# Patient Record
Sex: MALE | Race: WHITE | NOT HISPANIC OR LATINO | Employment: OTHER | URBAN - NONMETROPOLITAN AREA
[De-identification: names, ages, dates, MRNs, and addresses within clinical notes are randomized per-mention and may not be internally consistent; named-entity substitution may affect disease eponyms.]

---

## 2018-03-07 ENCOUNTER — OFFICE VISIT CONVERTED (OUTPATIENT)
Dept: FAMILY MEDICINE CLINIC | Age: 63
End: 2018-03-07
Attending: FAMILY MEDICINE

## 2018-06-04 ENCOUNTER — OFFICE VISIT CONVERTED (OUTPATIENT)
Dept: FAMILY MEDICINE CLINIC | Age: 63
End: 2018-06-04
Attending: FAMILY MEDICINE

## 2018-09-04 ENCOUNTER — OFFICE VISIT CONVERTED (OUTPATIENT)
Dept: FAMILY MEDICINE CLINIC | Age: 63
End: 2018-09-04
Attending: FAMILY MEDICINE

## 2018-12-10 ENCOUNTER — OFFICE VISIT CONVERTED (OUTPATIENT)
Dept: FAMILY MEDICINE CLINIC | Age: 63
End: 2018-12-10
Attending: FAMILY MEDICINE

## 2019-03-11 ENCOUNTER — OFFICE VISIT CONVERTED (OUTPATIENT)
Dept: FAMILY MEDICINE CLINIC | Age: 64
End: 2019-03-11
Attending: FAMILY MEDICINE

## 2019-07-03 ENCOUNTER — OFFICE VISIT CONVERTED (OUTPATIENT)
Dept: FAMILY MEDICINE CLINIC | Age: 64
End: 2019-07-03
Attending: FAMILY MEDICINE

## 2019-10-01 ENCOUNTER — HOSPITAL ENCOUNTER (OUTPATIENT)
Dept: OTHER | Facility: HOSPITAL | Age: 64
Discharge: HOME | End: 2019-10-01
Attending: FAMILY MEDICINE

## 2019-10-01 ENCOUNTER — OFFICE VISIT CONVERTED (OUTPATIENT)
Dept: FAMILY MEDICINE CLINIC | Age: 64
End: 2019-10-01
Attending: FAMILY MEDICINE

## 2019-10-01 LAB
ALBUMIN SERPL-MCNC: 4.2 G/DL (ref 3.5–5)
ALBUMIN/GLOB SERPL: 1.5 {RATIO} (ref 1.4–2.6)
ALP SERPL-CCNC: 203 U/L (ref 56–155)
ALT SERPL-CCNC: 15 U/L (ref 10–40)
ANION GAP SERPL CALC-SCNC: 20 MMOL/L (ref 8–19)
AST SERPL-CCNC: 18 U/L (ref 15–50)
BASOPHILS # BLD AUTO: 0.05 10*3/UL (ref 0–0.2)
BASOPHILS NFR BLD AUTO: 0.8 % (ref 0–3)
BILIRUB SERPL-MCNC: 0.22 MG/DL (ref 0.2–1.3)
BUN SERPL-MCNC: 10 MG/DL (ref 5–25)
BUN/CREAT SERPL: 11 {RATIO} (ref 6–20)
CALCIUM SERPL-MCNC: 9.1 MG/DL (ref 8.7–10.4)
CHLORIDE SERPL-SCNC: 104 MMOL/L (ref 99–111)
CHOLEST SERPL-MCNC: 143 MG/DL (ref 107–200)
CHOLEST/HDLC SERPL: 3.7 {RATIO} (ref 3–6)
CONV ABS IMM GRAN: 0.01 10*3/UL (ref 0–0.2)
CONV CO2: 21 MMOL/L (ref 22–32)
CONV IMMATURE GRAN: 0.2 % (ref 0–1.8)
CONV TOTAL PROTEIN: 7 G/DL (ref 6.3–8.2)
CREAT UR-MCNC: 0.88 MG/DL (ref 0.7–1.2)
DEPRECATED RDW RBC AUTO: 40.6 FL (ref 35.1–43.9)
EOSINOPHIL # BLD AUTO: 0.17 10*3/UL (ref 0–0.7)
EOSINOPHIL # BLD AUTO: 2.7 % (ref 0–7)
ERYTHROCYTE [DISTWIDTH] IN BLOOD BY AUTOMATED COUNT: 11.9 % (ref 11.6–14.4)
GFR SERPLBLD BASED ON 1.73 SQ M-ARVRAT: >60 ML/MIN/{1.73_M2}
GLOBULIN UR ELPH-MCNC: 2.8 G/DL (ref 2–3.5)
GLUCOSE SERPL-MCNC: 88 MG/DL (ref 70–99)
HCT VFR BLD AUTO: 38.3 % (ref 42–52)
HDLC SERPL-MCNC: 39 MG/DL (ref 40–60)
HGB BLD-MCNC: 12.5 G/DL (ref 14–18)
IRON SATN MFR SERPL: 31 % (ref 20–55)
IRON SERPL-MCNC: 114 UG/DL (ref 70–180)
LDLC SERPL CALC-MCNC: 82 MG/DL (ref 70–100)
LYMPHOCYTES # BLD AUTO: 1.67 10*3/UL (ref 1–5)
LYMPHOCYTES NFR BLD AUTO: 26.1 % (ref 20–45)
MCH RBC QN AUTO: 31 PG (ref 27–31)
MCHC RBC AUTO-ENTMCNC: 32.6 G/DL (ref 33–37)
MCV RBC AUTO: 95 FL (ref 80–96)
MONOCYTES # BLD AUTO: 0.52 10*3/UL (ref 0.2–1.2)
MONOCYTES NFR BLD AUTO: 8.1 % (ref 3–10)
NEUTROPHILS # BLD AUTO: 3.97 10*3/UL (ref 2–8)
NEUTROPHILS NFR BLD AUTO: 62.1 % (ref 30–85)
NRBC CBCN: 0 % (ref 0–0.7)
OSMOLALITY SERPL CALC.SUM OF ELEC: 290 MOSM/KG (ref 273–304)
PLATELET # BLD AUTO: 281 10*3/UL (ref 130–400)
PMV BLD AUTO: 10.3 FL (ref 9.4–12.4)
POTASSIUM SERPL-SCNC: 4.4 MMOL/L (ref 3.5–5.3)
PSA SERPL-MCNC: 0.78 NG/ML (ref 0–4)
RBC # BLD AUTO: 4.03 10*6/UL (ref 4.7–6.1)
SODIUM SERPL-SCNC: 141 MMOL/L (ref 135–147)
TIBC SERPL-MCNC: 368 UG/DL (ref 245–450)
TRANSFERRIN SERPL-MCNC: 257 MG/DL (ref 215–365)
TRIGL SERPL-MCNC: 109 MG/DL (ref 40–150)
VLDLC SERPL-MCNC: 22 MG/DL (ref 5–37)
WBC # BLD AUTO: 6.39 10*3/UL (ref 4.8–10.8)

## 2020-01-07 ENCOUNTER — OFFICE VISIT CONVERTED (OUTPATIENT)
Dept: FAMILY MEDICINE CLINIC | Age: 65
End: 2020-01-07
Attending: FAMILY MEDICINE

## 2020-04-01 ENCOUNTER — OFFICE VISIT CONVERTED (OUTPATIENT)
Dept: FAMILY MEDICINE CLINIC | Age: 65
End: 2020-04-01
Attending: FAMILY MEDICINE

## 2020-05-20 ENCOUNTER — HOSPITAL ENCOUNTER (OUTPATIENT)
Dept: OTHER | Facility: HOSPITAL | Age: 65
Discharge: HOME | End: 2020-05-20
Attending: FAMILY MEDICINE

## 2020-05-22 LAB — SARS-COV-2 RNA SPEC QL NAA+PROBE: NOT DETECTED

## 2020-07-21 ENCOUNTER — OFFICE VISIT CONVERTED (OUTPATIENT)
Dept: FAMILY MEDICINE CLINIC | Age: 65
End: 2020-07-21
Attending: FAMILY MEDICINE

## 2020-08-10 ENCOUNTER — HOSPITAL ENCOUNTER (OUTPATIENT)
Dept: OTHER | Facility: HOSPITAL | Age: 65
Discharge: HOME | End: 2020-08-10
Attending: FAMILY MEDICINE

## 2020-08-10 LAB
ALBUMIN SERPL-MCNC: 3.9 G/DL (ref 3.5–5)
ALBUMIN/GLOB SERPL: 1.2 {RATIO} (ref 1.4–2.6)
ALP SERPL-CCNC: 220 U/L (ref 56–155)
ALT SERPL-CCNC: 21 U/L (ref 10–40)
ANION GAP SERPL CALC-SCNC: 15 MMOL/L (ref 8–19)
AST SERPL-CCNC: 26 U/L (ref 15–50)
BILIRUB SERPL-MCNC: 0.28 MG/DL (ref 0.2–1.3)
BUN SERPL-MCNC: 12 MG/DL (ref 5–25)
BUN/CREAT SERPL: 13 {RATIO} (ref 6–20)
CALCIUM SERPL-MCNC: 9.2 MG/DL (ref 8.7–10.4)
CHLORIDE SERPL-SCNC: 100 MMOL/L (ref 99–111)
CHOLEST SERPL-MCNC: 173 MG/DL (ref 107–200)
CHOLEST/HDLC SERPL: 4.7 {RATIO} (ref 3–6)
CONV CO2: 24 MMOL/L (ref 22–32)
CONV TOTAL PROTEIN: 7.1 G/DL (ref 6.3–8.2)
CREAT UR-MCNC: 0.92 MG/DL (ref 0.7–1.2)
GFR SERPLBLD BASED ON 1.73 SQ M-ARVRAT: >60 ML/MIN/{1.73_M2}
GLOBULIN UR ELPH-MCNC: 3.2 G/DL (ref 2–3.5)
GLUCOSE SERPL-MCNC: 96 MG/DL (ref 70–99)
HDLC SERPL-MCNC: 37 MG/DL (ref 40–60)
LDLC SERPL CALC-MCNC: 94 MG/DL (ref 70–100)
OSMOLALITY SERPL CALC.SUM OF ELEC: 280 MOSM/KG (ref 273–304)
POTASSIUM SERPL-SCNC: 4.4 MMOL/L (ref 3.5–5.3)
SODIUM SERPL-SCNC: 135 MMOL/L (ref 135–147)
TRIGL SERPL-MCNC: 209 MG/DL (ref 40–150)
VLDLC SERPL-MCNC: 42 MG/DL (ref 5–37)

## 2020-10-20 ENCOUNTER — HOSPITAL ENCOUNTER (OUTPATIENT)
Dept: OTHER | Facility: HOSPITAL | Age: 65
Discharge: HOME | End: 2020-10-20
Attending: FAMILY MEDICINE

## 2020-10-20 ENCOUNTER — OFFICE VISIT CONVERTED (OUTPATIENT)
Dept: FAMILY MEDICINE CLINIC | Age: 65
End: 2020-10-20
Attending: FAMILY MEDICINE

## 2020-10-24 LAB
7-AMINOCLONAZEPAM UR: <5 NG/ML
A-OH ALPRAZ UR CFM-MCNC: <5 NG/ML
ALPHA-HYDROXYMIDAZOLAM, URINE: <20 NG/ML
ALPRAZ UR QL: <5 NG/ML
CLONAZEPAM UR QL: <5 NG/ML
CONV CHOLRDIAZEPOXIDE, QN URINE: <20 NG/ML
CONV OXAZEPAM, (TEMAZEPAM) QUANT: <20 NG/ML
DIAZEPAM UR-MCNC: <20 NG/ML
LORAZEPAM UR-MCNC: <20 NG/ML
MIDAZOLAM URINE: <20 NG/ML
NORDIAZEPAM URINE: <20 NG/ML
TEMAZEPAM UR QL CFM: <20 NG/ML

## 2021-01-20 ENCOUNTER — OFFICE VISIT CONVERTED (OUTPATIENT)
Dept: FAMILY MEDICINE CLINIC | Age: 66
End: 2021-01-20
Attending: FAMILY MEDICINE

## 2021-04-20 ENCOUNTER — HOSPITAL ENCOUNTER (EMERGENCY)
Dept: HOSPITAL 49 - FER | Age: 66
Discharge: HOME | End: 2021-04-20
Payer: COMMERCIAL

## 2021-04-20 DIAGNOSIS — R60.0: Primary | ICD-10-CM

## 2021-04-20 DIAGNOSIS — I10: ICD-10-CM

## 2021-05-18 NOTE — PROGRESS NOTES
Corey Velasquez  1955     Office/Outpatient Visit    Visit Date: Tue, Jan 7, 2020 08:58 am    Provider: Cleopatra Higginbotham MD (Assistant: Lyric Croft MA)    Location: [facility]        Electronically signed by Cleopatra Higginbotham MD on  01/07/2020 12:46:26 PM                             Subjective:        CC: Herrera is a 64 year old White male.  Patient presents today for three month follow up         HPI: Herrera is here to follow up on chronic issues.          He is on Adderall XR 30 mg daily for ADD.  He has been stable on this regimen for years now.  Concentration and focus have been well controlled.  No adverse effects.  He was originally diagnosed by Dr. Zeng many years ago.  He does not take drug holidays.        He is on metoprolol succinate for HTN.  BP has been well controlled.  No CP, SOB, palpitations.  He follows with Cardiology.        He is on fluoxetine for depression.  Sx have been well controlled in general.  His major stressor is his wife Archana's recent mental health issues that sent her to the hospital, but she has been doing much better of late.  Unfortunately, she is still having some memory issues which are stressful for him.  No depression or anhedonia.  No anxiety or panic attacks.         He is on meloxicam for aches and pains, worst in the R arm.    ROS:     CONSTITUTIONAL:  Negative for fatigue and fever.      EYES:  Negative for blurred vision.      E/N/T:  Positive for hoarseness.   Negative for nasal congestion or frequent rhinorrhea.      CARDIOVASCULAR:  Negative for chest pain and palpitations.      RESPIRATORY:  Negative for recent cough and dyspnea.      GASTROINTESTINAL:  Negative for abdominal pain, anorexia, constipation, diarrhea, nausea and vomiting.      MUSCULOSKELETAL:  Negative for arthralgias and myalgias.      INTEGUMENTARY:  Positive for rash.      PSYCHIATRIC:  Negative for anxiety, depression, feelings of stress, anhedonia, difficulty  "concentrating and sleep disturbance.          Past Medical History / Family History / Social History:         Last Reviewed on 2020 09:04 AM by Cleopatra Higginbotham    Past Medical History:     UNREMARKABLE         PREVENTIVE HEALTH MAINTENANCE             COLORECTAL CANCER SCREENING: Up to date (colonoscopy q10y; sigmoidoscopy q5y; Cologuard q3y) was last done 2018, Results are in chart; colonoscopy with normal results; poor prep     Hepatitis C Medicare Screening: was last done 2017         Surgical History:         Cholecystectomy: ;     Joint Replacement: L TKA; 2016;     Tonsillectomy/Adenoidectomy    ortho right hand;     Positive for    Cataract Removal: bilateral; ;     Positive for    Colonoscopy ( , NEG; ) and    EGD -GASTRITIS;;         Family History:     Father:  at age 80;  Myocardial Infarction;  brain tumor     Mother:  at age 78; Cause of death was pneumonia;  Lymphoma     Brother(s): Healthy; 1 brother(s) total         Social History:     Occupation: fire rahel;     Marital Status:  Rosana \"Archana\"     Children: 2 children         Tobacco/Alcohol/Supplements:     Last Reviewed on 2020 09:04 AM by Cleopatra Higginbotham    Tobacco: He has a past history of cigarette smoking; quit date:  1/15/2012.  Non-drinker     Caffeine:  He admits to consuming caffeine via coffee ( 6 servings per day ).          Substance Abuse History:     Last Reviewed on 2020 09:04 AM by Cleopatra Higginbotham        Mental Health History:     Last Reviewed on 2020 09:04 AM by Cleopatra Higginbotham        Communicable Diseases (eg STDs):     Last Reviewed on 2020 09:04 AM by Cleopatra Higginbotham        Current Problems:     Last Reviewed on 10/01/2019 09:59 AM by Cleopatra Higginbotham    Attention-deficit hyperactivity disorder, predominantly inattentive type    Essential (primary) hypertension    Unilateral primary osteoarthritis, left knee    Other long term (current) drug therapy    Anemia, " unspecified    Major depressive disorder, single episode, mild    Other hydrocele    Tachycardia, unspecified        Immunizations:     Hep A, adult dose 9/4/2018    Fluarix pf 10/1/2016    Fluzone (3 + years dose) 10/16/2017    Fluzone Quadrivalent (3+ years) 12/10/2018    Fluzone Quadrivalent (3+ years) 10/1/2019    Tdap (Tetanus, reduced diph, acellular pertussis) 10/1/2019    Shingrix (Zoster recombinant) 12/4/2018        Allergies:     Last Reviewed on 10/01/2019 09:59 AM by Cleopatra Higginbotham    Versed:      Ambien:      hepatits a vaccine:          Current Medications:     Last Reviewed on 10/01/2019 09:59 AM by Cleopatra Higginbotham    Prozac 40mg Capsules [Take 1 capsule(s) by mouth ]    Adderall XR 30 mg oral Capsule, Extended Release 24 hr [1 tab daily]    Metoprolol Succinate 25 mg oral Tablet, Extended Release 24 hr [1 tab PO daily]    OTC Iron Supplement 28mg Take one tablet once daily      OTC Vitamin B Complex w/ Vitamin C Take one tablet once daily      OTC Advil Take two tablets in the morning      Meloxicam 15 mg oral tablet [1 po qd wf prn]        Objective:        Vitals:         Current: 1/7/2020 9:01:48 AM    Ht:  5 ft, 6 in;  Wt: 223.6 lbs;  BMI: 36.1T: 98.3 F (oral);  BP: 140/81 mm Hg (left arm, sitting);  P: 91 bpm (left arm (BP Cuff), sitting);  sCr: 0.88 mg/dL;  GFR: 88.72        Exams:     PHYSICAL EXAM:     GENERAL: Vitals recorded well developed, well nourished;  well groomed;  no apparent distress;     EYES: extraocular movements intact; conjunctiva and cornea are normal; PERRLA;     E/N/T: OROPHARYNX: posterior pharynx shows no exudate and erythema;     RESPIRATORY: normal respiratory rate and pattern with no distress; normal breath sounds with no rales, rhonchi, wheezes or rubs;     CARDIOVASCULAR: normal rate; rhythm is regular;  no systolic murmur; no edema;     SKIN: a rash is noted L dorsal arm;  the color is mainly red;  it is best characterized as papular;     MUSCULOSKELETAL: normal  gait; normal overall tone     PSYCHIATRIC: appropriate affect and demeanor; normal psychomotor function; normal speech pattern; normal thought and perception;         Lab/Test Results:         Amphetamines Screen, Urin: Positive (01/07/2020),     BAR-Barbiturates Screen, Urin: Negative (01/07/2020),     Buprenorphine: Negative (01/07/2020),     BZO-Benzodiazepines Screen,Ur: Negative (01/07/2020),     Cocaine(Metab.)Screen, Ur: Negative (01/07/2020),     MDMA-Ecstasy: Negative (01/07/2020),     Met-Methamphetamine: Negative (01/07/2020),     MTD-Methadone Screen, Urine: Negative (01/07/2020),     Opiate Screen, Urine: Negative (01/07/2020),     OXY-Oxycodone: Negative (01/07/2020),     PCP-Phencyclidine Screen, Uri: Negative (01/07/2020),     THC Cannabinoids Screen, Urin: Negative (01/07/2020),     Urine temperature: confirmed (01/07/2020),     Date and time of last pill: Adderall 01/07/2020 @ 0600/AS (01/07/2020),     Performed by: tls (01/07/2020),     Collection Time: 0905 (01/07/2020),             Assessment:         F90.0   ADD - Attention-deficit hyperactivity disorder, predominantly inattentive type       Z79.899   Other long term (current) drug therapy       I10   HTN - Essential (primary) hypertension       F32.0   Depression - Major depressive disorder, single episode, mild       Z13.31   Encounter for screening for depression       R21   Rash and other nonspecific skin eruption           ORDERS:         Meds Prescribed:       [New Rx] mupirocin 2 % Topical Ointment [apply a small amount to the affected area by topical route 2 times per day], #22 (twenty two) grams, Refills: 1 (one)         Radiology/Test Orders:       3017F  Colorectal CA screen results documented and reviewed (PV)  (In-House)              Lab Orders:       [id number]  Drug test prsmv read direct optical obs pr date  (In-House)            APPTO  Appointment need  (In-House)              Procedures Ordered:       REFER  Referral to Specialist  or Other Facility  (Send-Out)              Other Orders:         Depression screen positive and follow up plan documented  (In-House)                      Plan:         ADD - Attention-deficit hyperactivity disorder, predominantly inattentive typeHe is stable on his current regimen.  Concentration and focus are well controlled.  No adverse effects.  He does require ongoing use of this controlled substance to function.  Tox screen and Christopher run today.  No refills needed today.  RTC 3 months.    MIPS PHQ-9 Depression Screening: Completed form scanned and in chart; Total Score 9 Positive Depression Screen: Stable on medications. No suicidal ideation.      FOLLOW-UP: Schedule a follow-up visit in 3 months.:.  f/u ADD with [facility]          Orders:       3017F  Colorectal CA screen results documented and reviewed (PV)  (In-House)            APPTO  Appointment need  (In-House)              Depression screen positive and follow up plan documented  (In-House)              Other long term (current) drug therapy    Controlled substance documentation: Christopher reviewed; drug screen performed and appropriate; consent is reviewed and signed and on the chart.  He is aware of risk of addiction on this medication, understands that he will need to follow up for a review every 3 months and his medications will be adjusted or decreased as deemed appropriate at each visit.  No history of drug or alcohol abuse.  No concerns about diversion or abuse. He denies side effects related to the medication.  He is aware that he may be called in for pill counts.  The dosing of this medication will be reviewed on a regular basis and reduced if possible..  Ongoing use of a controlled substance is necessary for this patient to have a normal quality of life           Orders:       [id number]  Drug test prsmv read direct optical obs pr date  (In-House)              HTN - Essential (primary) hypertensionBP at goal.  He is under some stress currently  "but has some good coping mechanisms.  No refills needed. Labs reviewed and UTD.        Depression - Major depressive disorder, single episode, mild\"I'm doing fine.\"  Worried about Archana's memory but this does continue to improve.  No refills needed.        Rash and other nonspecific skin eruptionRash on the dorsal left arm x 2 months.  Had a similar outbreak last summer.  Discrete pruritic red papules that begin as pustules.  He has been using all kinds of OTC creams but nothing helps at all except Vagisil (takes away the itch) but nothing heals up the ulcers.  Trying some mupirocin due to pustular appearance and placing referral to Derm for further assistance with dx and treatment.        REFERRALS:  Referral initiated to a dermatologist ( Dr. Wendie Duron; for evaluation of L arm rash ).            Prescriptions:       [New Rx] mupirocin 2 % Topical Ointment [apply a small amount to the affected area by topical route 2 times per day], #22 (twenty two) grams, Refills: 1 (one)           Orders:       REFER  Referral to Specialist or Other Facility  (Send-Out)                  Patient Recommendations:        For  ADD - Attention-deficit hyperactivity disorder, predominantly inattentive type:    Schedule a follow-up visit in 3 months.                APPOINTMENT INFORMATION:        Monday Tuesday Wednesday Thursday Friday Saturday Sunday            Time:___________________AM  PM   Date:_____________________             Charge Capture:         Primary Diagnosis:     F90.0  ADD - Attention-deficit hyperactivity disorder, predominantly inattentive type           Orders:      97104  Office/outpatient visit; established patient, level 4  (In-House)            3017F  Colorectal CA screen results documented and reviewed (PV)  (In-House)            APPTO  Appointment need  (In-House)              Depression screen positive and follow up plan documented  (In-House)              Z79.899  Other long term (current) drug " therapy           Orders:      12230  Drug test prsmv read direct optical obs pr date  (In-House)              I10  HTN - Essential (primary) hypertension     F32.0  Depression - Major depressive disorder, single episode, mild     Z13.31  Encounter for screening for depression     R21  Rash and other nonspecific skin eruption

## 2021-05-18 NOTE — PROGRESS NOTES
Corey Velasquez 1955     Office/Outpatient Visit    Visit Date: Mon, Dec 10, 2018 10:11 am    Provider: Cleopatra Higginbotham MD (Assistant: Lyric Croft MA)    Location: [facility]        Electronically signed by Cleopatra Higginbotham MD on  12/10/2018 10:47:31 AM                             SUBJECTIVE:        CC:     Herrera is a 63 year old White male.  This is a follow-up visit.  Patient presents today for three month follow up         HPI: Herrera is here to follow up on chronic issues.          He is on Adderall XR for ADD.  He has been stable on this regimen for many years.  Focus and concentration are well controlled as long as he stays on the med, although he has had trouble when he tried to go off.  No adverse effects.  He was originally diagnosed by Dr. Zeng years ago.  He does not take drug holidays.        He is on metoprolol succinate for HTN.  BP has been well controlled.  No CP, SOB, palpitations.  He follows with Cardiology.        He is on fluoxetine for depression.  Sx have been well controlled.  No depression, anhedonia.  No anxiety.  Eating and sleeping well.     ROS:     CONSTITUTIONAL:  Negative for chills and fever.      EYES:  Negative for blurred vision.      CARDIOVASCULAR:  Negative for chest pain and palpitations.      RESPIRATORY:  Negative for recent cough and dyspnea.      GASTROINTESTINAL:  Negative for abdominal pain, anorexia, constipation, diarrhea, nausea and vomiting.      MUSCULOSKELETAL:  Negative for arthralgias and myalgias.      PSYCHIATRIC:  Negative for anxiety, depression, feelings of stress, anhedonia, difficulty concentrating and sleep disturbance.          PMH/FMH/SH:     Last Reviewed on 12/10/2018 10:16 AM by Cleopatra Higginbotham    Past Medical History:     UNREMARKABLE         PREVENTIVE HEALTH MAINTENANCE             COLORECTAL CANCER SCREENING: Up to date (colonoscopy q10y; sigmoidoscopy q5y; Cologuard q3y) was last done 2/9/2018, Results are in chart;  colonoscopy with normal results; poor prep     Hepatitis C Medicare Screening: was last done 2017         Surgical History:         Cholecystectomy: ;     Joint Replacement: L TKA; 2016;      Tonsillectomy/Adenoidectomy    ortho right hand;     Positive for    Cataract Removal: bilateral; ;     Positive for    Colonoscopy ( , NEG; ) and    EGD -GASTRITIS;;         Family History:     Father:  at age 80;  Myocardial Infarction;  brain tumor     Mother:  at age 78; Cause of death was pneumonia;  Lymphoma     Brother(s): Healthy; 1 brother(s) total         Social History:     Occupation: fire rahel;     Marital Status:      Children: 2 children         Tobacco/Alcohol/Supplements:     Last Reviewed on 12/10/2018 10:16 AM by Cleopatra Higginbotham    Tobacco: He has a past history of cigarette smoking; quit date:  1/15/2012.  Non-drinker     Caffeine:  He admits to consuming caffeine via coffee ( 6 servings per day ).          Substance Abuse History:     Last Reviewed on 12/10/2018 10:16 AM by Cleopatra Higginbotham        Mental Health History:     Last Reviewed on 12/10/2018 10:16 AM by Cleopatra Higginbotham        Communicable Diseases (eg STDs):     Last Reviewed on 12/10/2018 10:16 AM by Cleopatra Higginbotham            Current Problems:     Last Reviewed on 2018 09:54 AM by Cleopatra Higginbotham    History of noncompliance with medical treatment     Osteoarthritis of knee     ADD     Depression     Hypertension     Other high-risk medications     Nodular prostate, without urinary obstruction     Tachycardia, unspecified     Artificial joint replacement, Knee     Unspecified anemia     Hydrocele, other specified type     Other specified gastritis, without mention of hemorrhage         Immunizations:     Hep A, adult dose 2018     Fluarix pf 10/1/2016     Fluzone (3 + years dose) 10/16/2017         Allergies:     Last Reviewed on 2018 09:54 AM by Cleopatra Higginbotham    Versed:    Alexien:        Current  Medications:     Last Reviewed on 9/04/2018 09:54 AM by Cleopatra Higginbotham    Adderall XR 30mg Capsules, Extended Release 1 tab daily     Prozac 40mg Capsules Take 1 capsule(s) by mouth     Metoprolol Succinate 25mg Tablets, Extended Release 1 tab PO daily     OTC Advil Take two tablets in the morning     OTC Iron Supplement 28mg Take one tablet once daily     OTC Vitamin B Complex w/ Vitamin C Take one tablet once daily         OBJECTIVE:        Vitals:         Current: 12/10/2018 10:13:55 AM    Ht:  5 ft, 6 in;  Wt: 209.2 lbs;  BMI: 33.8    T: 98.3 F (oral);  BP: 108/94 mm Hg (left arm, sitting);  P: 88 bpm (left arm (BP Cuff), sitting);  sCr: 0.82 mg/dL;  GFR: 93.70        Exams:     PHYSICAL EXAM:     GENERAL: Vitals recorded well developed, well nourished;  well groomed;  no apparent distress;     EYES: extraocular movements intact; conjunctiva and cornea are normal; PERRLA;     E/N/T: OROPHARYNX: posterior pharynx shows no exudate and erythema;     RESPIRATORY: normal respiratory rate and pattern with no distress; normal breath sounds with no rales, rhonchi, wheezes or rubs;     CARDIOVASCULAR: normal rate; rhythm is regular;  no systolic murmur; no edema;     MUSCULOSKELETAL: normal gait; normal overall tone     PSYCHIATRIC: appropriate affect and demeanor; normal psychomotor function; normal speech pattern; normal thought and perception;         Lab/Test Results:             Amphetamines Screen, Urin:  Positive (12/10/2018),     BAR-Barbiturates Screen, Urin:  Negative (12/10/2018),     Buprenorphine:  Negative (12/10/2018),     BZO-Benzodiazepines Screen,Ur:  Negative (12/10/2018),     Cocaine(Metab.)Screen, Ur:  Negative (12/10/2018),     MDMA-Ecstasy:  Negative (12/10/2018),     Met-Methamphetamine:  Negative (12/10/2018),     MTD-Methadone Screen, Urine:  Negative (12/10/2018),     Opiate Screen, Urine:  Negative (12/10/2018),     OXY-Oxycodone:  Negative (12/10/2018),     PCP-Phencyclidine Screen, Uri:   Negative (12/10/2018),     THC Cannabinoids Screen, Urin:  Negative (12/10/2018),     Urine temperature:  confirmed (12/10/2018),     Date and time of last pill:  adderall 12/10/18 @630/AS (12/10/2018),     Performed by:  greta (12/10/2018),     Collection Time:  10:18 (12/10/2018),             ASSESSMENT           314.00   F90.0  ADD              DDx:     V58.69   Z79.899  Use of high risk medications              DDx:     401.1   I10  Hypertension              DDx:     296.20   F34.9  Depression              DDx:         ORDERS:         Meds Prescribed:       Refill of: Adderall XR (Amphetamine Aspartate-Sulf/Dextroamphetamine Saccharate-Sulf) 30mg Capsules, Extended Release 1 tab daily  #30 (Thirty) capsule(s) Refills: 0         Lab Orders:       [id number]  Drug test prsmv read direct optical obs pr date  (In-House)         APPTO  Appointment need  (In-House)                   PLAN:          ADD He is stable on his Adderall.  Sx are well controlled.  No adverse effects.  He does require ongoing use of this controlled substance to function.  Tox screen and Christopher run today.  RTC 3 months.         FOLLOW-UP: Schedule a follow-up visit in 3 months..  f/u ADD with [facility]           Prescriptions:       Refill of: Adderall XR (Amphetamine Aspartate-Sulf/Dextroamphetamine Saccharate-Sulf) 30mg Capsules, Extended Release 1 tab daily  #30 (Thirty) capsule(s) Refills: 0           Orders:       APPTO  Appointment need  (In-House)             Patient Education Handouts:       [facility] [place], [place] Medication Compliance           Use of high risk medications     Controlled substance documentation: Christopher reviewed; drug screen performed and appropriate; consent is reviewed and signed and on the chart.  He is aware of risk of addiction on this medication, understands that he will need to follow up for a review every 3 months and his medications will be adjusted or decreased as deemed appropriate at each visit.  No history of drug or alcohol abuse.   No concerns about diversion or abuse. He denies side effects related to the medication.  He is aware that he may be called in for pill counts.  The dosing of this medication will be reviewed on a regular basis and reduced if possible..  Ongoing use of a controlled substance is necessary for this patient to have a normal quality of life           Orders:       [id number]  Drug test prsmv read direct optical obs pr date  (In-House)            Hypertension BP at goal.  No refills needed.  Labs UTD.          Depression Stable.  No refills needed.              Patient Recommendations:        For  ADD:     Schedule a follow-up visit in 3 months.                APPOINTMENT INFORMATION:        Monday Tuesday Wednesday Thursday Friday Saturday Sunday            Time:___________________AM  PM   Date:_____________________             CHARGE CAPTURE           **Please note: ICD descriptions below are intended for billing purposes only and may not represent clinical diagnoses**        Primary Diagnosis:         314.00 ADD            F90.0    Attention-deficit hyperactivity disorder, predominantly inattentive type              Orders:          00770   Office/outpatient visit; established patient, level 4  (In-House)             APPTO   Appointment need  (In-House)           V58.69 Use of high risk medications            Z79.899    Other long term (current) drug therapy              Orders:          [id number]   Drug test prsmv read direct optical obs pr date  (In-House)           401.1 Hypertension            I10    Essential (primary) hypertension    296.20 Depression            F34.9    Persistent mood [affective] disorder, unspecified

## 2021-05-18 NOTE — PROGRESS NOTES
Croey Velasquez  1955     Office/Outpatient Visit    Visit Date: Wed, Apr 1, 2020 09:33 am    Provider: Cleopatra Higginbotham MD (Assistant: Nelly Salomon MA)    Location: [facility], [place]        Electronically signed by Cleopatra Higginbotham MD on  04/01/2020 10:34:27 AM                             Subjective:        CC: Herrera is a 65 year old White male.  This is a follow-up visit.          HPI: Herrera is here to follow up on chronic issues.          He has had rhinorrhea and congestion with significant sinus pressure for the past 2 weeks.  This was preceded by one week of cough, myalgias, fatigue, malaise, and sore throat.  Those sx are all resolved except the sore throat and the sinus sx as above.        He is on Adderall XR 30 mg daily for ADD.  He has been stable on this regimen for years now.  Concentration and focus have been well controlled.  No adverse effects.  He was originally diagnosed by Dr. Zeng many years ago.  He does not take drug holidays.        He is on metoprolol succinate for HTN.  BP has been well controlled.  No CP, SOB, palpitations.  Follows with Cardiology.        He is on fluoxetine for depression.  Sx have been well controlled in general.  No depression or anhedonia.  No anxiety or panic attacks.         He is on meloxicam for aches and pains.    ROS:     CONSTITUTIONAL:  Negative for fatigue and fever.      EYES:  Negative for blurred vision.      E/N/T:  Positive for nasal congestion, frequent rhinorrhea, sinus pressure and sore throat.   Negative for ear pain.      CARDIOVASCULAR:  Negative for chest pain and palpitations.      RESPIRATORY:  Negative for recent cough and dyspnea.      GASTROINTESTINAL:  Negative for abdominal pain, anorexia, constipation, diarrhea, nausea and vomiting.      MUSCULOSKELETAL:  Negative for arthralgias and myalgias.      PSYCHIATRIC:  Negative for anxiety, depression, feelings of stress, anhedonia, difficulty concentrating and sleep  "disturbance.          Past Medical History / Family History / Social History:         Last Reviewed on 2020 09:58 AM by Cleopatra Higginbotham    Past Medical History:     UNREMARKABLE         PREVENTIVE HEALTH MAINTENANCE             COLORECTAL CANCER SCREENING: Up to date (colonoscopy q10y; sigmoidoscopy q5y; Cologuard q3y) was last done 2018, Results are in chart; colonoscopy with normal results; poor prep     Hepatitis C Medicare Screening: was last done 2017         Surgical History:         Cholecystectomy: ;     Joint Replacement: L TKA; 2016;     Tonsillectomy/Adenoidectomy    ortho right hand;     Positive for    Cataract Removal: bilateral; ;     Positive for    Colonoscopy ( , NEG; ) and    EGD -GASTRITIS;;         Family History:     Father:  at age 80;  Myocardial Infarction;  brain tumor     Mother:  at age 78; Cause of death was pneumonia;  Lymphoma     Brother(s): Healthy; 1 brother(s) total         Social History:     Occupation: fire rahel;     Marital Status:  Rosana \"Archana\"     Children: 2 children         Tobacco/Alcohol/Supplements:     Last Reviewed on 2020 09:58 AM by Cleopatra Higginbotham    Tobacco: He has a past history of cigarette smoking; quit date:  1/15/2012.  Non-drinker     Caffeine:  He admits to consuming caffeine via coffee ( 6 servings per day ).          Substance Abuse History:     Last Reviewed on 2020 09:58 AM by Cleopatra Higginbotham        Mental Health History:     Last Reviewed on 2020 09:58 AM by Cleopatra Higginbotham        Communicable Diseases (eg STDs):     Last Reviewed on 2020 09:58 AM by Cleopatra Higginbotham        Current Problems:     Last Reviewed on 2020 09:04 AM by Cleopatra Higginbotham    ADD - Attention-deficit hyperactivity disorder, predominantly inattentive type    HTN - Essential (primary) hypertension    Unilateral primary osteoarthritis, left knee    Other long term (current) drug therapy    Anemia, unspecified    " Depression - Major depressive disorder, single episode, mild    Other hydrocele    Tachycardia, unspecified    Rash and other nonspecific skin eruption    Encounter for screening for depression        Immunizations:     Hep A, adult dose 9/4/2018    Fluarix pf 10/1/2016    Fluzone (3 + years dose) 10/16/2017    Fluzone Quadrivalent (3+ years) 12/10/2018    Fluzone Quadrivalent (3+ years) 10/1/2019    Tdap (Tetanus, reduced diph, acellular pertussis) 10/1/2019    Shingrix (Zoster recombinant) 12/4/2018        Allergies:     Last Reviewed on 4/01/2020 09:36 AM by Nelly Salomon    Versed:      Ambien:      hepatits a vaccine:          Current Medications:     Last Reviewed on 4/01/2020 09:36 AM by Nelly Salomon    Prozac 40mg Capsules [Take 1 capsule(s) by mouth ]    Adderall XR 30 mg oral Capsule, Extended Release 24 hr [1 tab daily]    Metoprolol Succinate 25 mg oral Tablet, Extended Release 24 hr [1 tab PO daily]    OTC Iron Supplement 28mg Take one tablet once daily      OTC Vitamin B Complex w/ Vitamin C Take one tablet once daily      OTC Advil Take two tablets in the morning      meloxicam 15 mg oral tablet [TAKE ONE TABLET BY MOUTH DAILY WITH FOOD AS NEEDED]    mupirocin 2 % Topical Ointment [apply a small amount to the affected area by topical route 2 times per day]    acyclovir 5 % Topical Ointment [TID prn]        Assessment:         F90.0   ADD - Attention-deficit hyperactivity disorder, predominantly inattentive type       Z79.899   Other long term (current) drug therapy       J01.00   Acute maxillary sinusitis, unspecified       I10   HTN - Essential (primary) hypertension       M17.12   Unilateral primary osteoarthritis, left knee       F32.0   Depression - Major depressive disorder, single episode, mild           ORDERS:         Meds Prescribed:       [New Rx] Augmentin 875-125 mg oral tablet [take 1 tablet by oral route every 12 hours for 10 days], #20 (twenty) tablets, Refills: 0 (zero)         Lab  Orders:       APPTO  Appointment need  (In-House)                      Plan:         ADD - Attention-deficit hyperactivity disorder, predominantly inattentive typeStable on current regimen.  Sx well controlled.  No adverse effects.  He does require ongoing use of this controlled substance to function.  Prior tox screen appropriate.  Christopher run today.  No refills needed.  RTC 3 months.    Telehealth: Verbal consent obtained for visit to occur via phone call; Staff, other than provider, present during telephone visit include Nelly Salomon MA; Total time spent was 8 minutes; [id number]--Rjqqetbkb E/M 5-10 minutes     FOLLOW-UP: Schedule a follow-up visit in 3 months.:.  f/u ADD with [place]          Orders:       APPTO  Appointment need  (In-House)              Other long term (current) drug therapy    Controlled substance documentation: Christopher reviewed; prior drug screen consistent; consent is reviewed and signed and on the chart.  He is aware of risk of addiction on this medication, understands that he will need to follow up for a review every 3 months and his medications will be adjusted or decreased as deemed appropriate at each visit.  No history of drug or alcohol abuse.  No concerns about diversion or abuse. He denies side effects related to the medication.  He is aware that he may be called in for pill counts.  The dosing of this medication will be reviewed on a regular basis and reduced if possible..  Ongoing use of a controlled substance is necessary for this patient to have a normal quality of life         Acute maxillary sinusitis, unspecifiedTreating sinusitis due to sx duration.  Symptomatic care recommended with OTC analgesics for pain/fever, OTC decongestants and cough suppressants prn.  Stay well hydrated.  Humidifier in the bedroom at night.  Hot tea with lemon and honey.  RTC prn worsening or failure to improve of sx.          Prescriptions:       [New Rx] Augmentin 875-125 mg oral tablet [take 1 tablet by  oral route every 12 hours for 10 days], #20 (twenty) tablets, Refills: 0 (zero)         HTN - Essential (primary) hypertensionStable.  No refills needed.        Unilateral primary osteoarthritis, left kneeStable.  No refills needed.        Depression - Major depressive disorder, single episode, mildStable.  No refills needed.            Patient Recommendations:        For  ADD - Attention-deficit hyperactivity disorder, predominantly inattentive type:    Schedule a follow-up visit in 3 months.                APPOINTMENT INFORMATION:        Monday Tuesday Wednesday Thursday Friday Saturday Sunday            Time:___________________AM  PM   Date:_____________________             Charge Capture:         Primary Diagnosis:     F90.0  ADD - Attention-deficit hyperactivity disorder, predominantly inattentive type           Orders:      APPTO  Appointment need  (In-House)            [id number]  Phys/QHP telephone evaluation 5-10 min  (In-House)              Z79.899  Other long term (current) drug therapy     J01.00  Acute maxillary sinusitis, unspecified     I10  HTN - Essential (primary) hypertension     M17.12  Unilateral primary osteoarthritis, left knee     F32.0  Depression - Major depressive disorder, single episode, mild

## 2021-05-18 NOTE — PROGRESS NOTES
oCrey Velasquez  1955     Office/Outpatient Visit    Visit Date: Tue, Jul 21, 2020 09:55 am    Provider: Cleopatra Higginbotham MD (Assistant: Nelly Salomon MA)    Location: [facility]        Electronically signed by Cleopatra Higginbotham MD on  07/21/2020 10:24:35 AM                             Subjective:        CC: Herrera is a 65 year old White male.  This is a follow-up visit.  [phone] [employer]         HPI: Herrera's telehealth visit today is for f/u on chronic issues.          He is on Adderall XR 30 mg daily for ADD.  He has been stable on this regimen for years. Concentration and focus have been well controlled.  No adverse effects.  He was originally diagnosed by Dr. Zeng many years ago.  He does not take drug holidays.        He is on metoprolol succinate for HTN.  BP has been well controlled.  No CP, SOB, palpitations.  He follows with Cards.        He is on Prozac for depression.  Sx have been well controlled.  No depression or anhedonia.  No anxiety or panic attacks.         He is on meloxicam for generalized aches and pains.    ROS:     CONSTITUTIONAL:  Negative for fatigue and fever.      EYES:  Negative for blurred vision.      E/N/T:  Negative for ear pain, nasal congestion and frequent rhinorrhea.      CARDIOVASCULAR:  Negative for chest pain and palpitations.      RESPIRATORY:  Negative for recent cough and dyspnea.      GASTROINTESTINAL:  Negative for abdominal pain, anorexia, constipation, diarrhea, nausea and vomiting.      MUSCULOSKELETAL:  Negative for arthralgias and myalgias.      PSYCHIATRIC:  Negative for anxiety, depression, feelings of stress, anhedonia, difficulty concentrating and sleep disturbance.          Past Medical History / Family History / Social History:         Last Reviewed on 7/21/2020 10:13 AM by Cleopatra Higginbotham    Past Medical History:     UNREMARKABLE         PREVENTIVE HEALTH MAINTENANCE             COLORECTAL CANCER SCREENING: Up to date (colonoscopy  "q10y; sigmoidoscopy q5y; Cologuard q3y) was last done 2018, Results are in chart; colonoscopy with normal results; poor prep     Hepatitis C Medicare Screening: was last done 2017         Surgical History:         Cholecystectomy: ;     Joint Replacement: L TKA; 2016;     Tonsillectomy/Adenoidectomy    ortho right hand;     Positive for    Cataract Removal: bilateral; ;     Positive for    Colonoscopy ( , NEG; ) and    EGD -GASTRITIS;;         Family History:     Father:  at age 80;  Myocardial Infarction;  brain tumor     Mother:  at age 78; Cause of death was pneumonia;  Lymphoma     Brother(s): Healthy; 1 brother(s) total         Social History:     Occupation: fire rahel;     Marital Status:  Rosana \"Archana\"     Children: 2 children         Tobacco/Alcohol/Supplements:     Last Reviewed on 2020 10:13 AM by Cleopatra Higginbotham    Tobacco: He has a past history of cigarette smoking; quit date:  1/15/2012.  Non-drinker     Caffeine:  He admits to consuming caffeine via coffee ( 6 servings per day ).          Substance Abuse History:     Last Reviewed on 2020 10:13 AM by Cleopatra Higginbotham        Mental Health History:     Last Reviewed on 2020 10:13 AM by Cleopatra Higginbotham        Communicable Diseases (eg STDs):     Last Reviewed on 2020 10:13 AM by Cleopatra Higginbotham        Current Problems:     Last Reviewed on 2020 09:58 AM by Cleopatra Higginbotham    ADD - Attention-deficit hyperactivity disorder, predominantly inattentive type    HTN - Essential (primary) hypertension    Unilateral primary osteoarthritis, left knee    Other long term (current) drug therapy    Other hydrocele    Tachycardia, unspecified    Anemia, unspecified    Depression - Major depressive disorder, single episode, mild    Rash and other nonspecific skin eruption    Acute maxillary sinusitis, unspecified    Cough        Immunizations:     Hep A, adult dose 2018    Fluarix pf 10/1/2016    Fluzone " (3 + years dose) 10/16/2017    Fluzone Quadrivalent (3+ years) 12/10/2018    Fluzone Quadrivalent (3+ years) 10/1/2019    Tdap (Tetanus, reduced diph, acellular pertussis) 10/1/2019    Shingrix (Zoster recombinant) 12/4/2018        Allergies:     Last Reviewed on 7/21/2020 09:55 AM by Nelly Salomon    Versed:      Ambien:      hepatits a vaccine:          Current Medications:     Last Reviewed on 7/21/2020 09:55 AM by Nelly Salomon    Prozac 40mg Capsules [Take 1 capsule(s) by mouth ]    Adderall XR 30 mg oral Capsule, Extended Release 24 hr [1 tab daily]    Metoprolol Succinate 25 mg oral Tablet, Extended Release 24 hr [1 tab PO daily]    OTC Iron Supplement 28mg Take one tablet once daily      OTC Vitamin B Complex w/ Vitamin C Take one tablet once daily      OTC Advil Take two tablets in the morning      meloxicam 15 mg oral tablet [TAKE ONE TABLET BY MOUTH DAILY WITH FOOD AS NEEDED]    mupirocin 2 % Topical Ointment [apply a small amount to the affected area by topical route 2 times per day]    acyclovir 5 % Topical Ointment [TID prn]        Objective:        Exams:     PHYSICAL EXAM:     GENERAL: well developed, well nourished;  well groomed;  no apparent distress;     EYES: extraocular movements intact; conjunctiva and cornea are normal; PERRLA;     RESPIRATORY: normal respiratory rate and pattern with no distress;     MUSCULOSKELETAL: normal overall tone     NEUROLOGIC: mental status: alert and oriented x 3;     PSYCHIATRIC: appropriate affect and demeanor; normal psychomotor function; normal speech pattern;         Assessment:         F90.0   ADD - Attention-deficit hyperactivity disorder, predominantly inattentive type       Z79.899   Other long term (current) drug therapy       I10   HTN - Essential (primary) hypertension       M17.12   Unilateral primary osteoarthritis, left knee       F32.0   Depression - Major depressive disorder, single episode, mild           ORDERS:         Meds Prescribed:        [Refilled] Metoprolol Succinate 25 mg oral Tablet, Extended Release 24 hr [1 tab PO daily], #90 (ninety) tablets, Refills: 3 (three)       [Refilled] Adderall XR 30 mg oral Capsule, Extended Release 24 hr [1 tab daily], #30 (thirty) capsules, Refills: 0 (zero)         Radiology/Test Orders:       3017F  Colorectal CA screen results documented and reviewed (PV)  (In-House)              Lab Orders:       APPTO  Appointment need  (In-House)            [id number]  [facility] CMP AND LIPID: [id number], [id number]  (Send-Out)                      Plan:         ADD - Attention-deficit hyperactivity disorder, predominantly inattentive typeStable on current regimen.  Sx well controlled.  No adverse effects.  He does require ongoing use of this controlled substance to function.  Prior tox screen appropriate.  Will have him come in this week for his tox screen.  Christopher run today.  Refills needed and sent.  RTC 3 months.    MIPS Vaccines Flu and Pneumonia updated in Shot record Colorectal Cancer Screening is up to date and the results are in the chart Telehealth: Verbal consent obtained for visit to occur via televideo conferencing; Staff, other than provider, present during telephone visit include Nelly Salomon MA     FOLLOW-UP: Schedule a follow-up visit in 3 months.:.  f/u ADD with Karolyn SCHEDULED          Prescriptions:       [Refilled] Adderall XR 30 mg oral Capsule, Extended Release 24 hr [1 tab daily], #30 (thirty) capsules, Refills: 0 (zero)           Orders:       3017F  Colorectal CA screen results documented and reviewed (PV)  (In-House)            APPTO  Appointment need  (In-House)              Other long term (current) drug therapy    Controlled substance documentation: Christopher reviewed; prior drug screen consistent; consent is reviewed and signed and on the chart.  He is aware of risk of addiction on this medication, understands that he will need to follow up for a review every 3 months and his medications will be  adjusted or decreased as deemed appropriate at each visit.  No history of drug or alcohol abuse.  No concerns about diversion or abuse. He denies side effects related to the medication.  He is aware that he may be called in for pill counts.  The dosing of this medication will be reviewed on a regular basis and reduced if possible..  Ongoing use of a controlled substance is necessary for this patient to have a normal quality of life         HTN - Essential (primary) hypertensionStable.  Checking labs.  Refills sent.    LABORATORY:  Labs ordered to be performed today include HTN/Lipid Panel: CMP, Lipid.            Prescriptions:       [Refilled] Metoprolol Succinate 25 mg oral Tablet, Extended Release 24 hr [1 tab PO daily], #90 (ninety) tablets, Refills: 3 (three)           Orders:       [id number]  HTN - [facility] CMP AND LIPID: [id number], [id number]  (Send-Out)              Unilateral primary osteoarthritis, left kneeStable.  No refills needed.        Depression - Major depressive disorder, single episode, mildStable.  No refills needed.            Patient Recommendations:        For  ADD - Attention-deficit hyperactivity disorder, predominantly inattentive type:    Schedule a follow-up visit in 3 months.                APPOINTMENT INFORMATION:        Monday Tuesday Wednesday Thursday Friday Saturday Sunday            Time:___________________AM  PM   Date:_____________________             Charge Capture:         Primary Diagnosis:     F90.0  ADD - Attention-deficit hyperactivity disorder, predominantly inattentive type           Orders:      [id number]  Office/outpatient visit; established patient, level 4  (In-House)            3017F  Colorectal CA screen results documented and reviewed (PV)  (In-House)            APPTO  Appointment need  (In-House)              Z79.899  Other long term (current) drug therapy     I10  HTN - Essential (primary) hypertension     M17.12  Unilateral primary osteoarthritis, left knee     F32.0   Depression - Major depressive disorder, single episode, mild

## 2021-05-18 NOTE — PROGRESS NOTES
Corey Velasquez 1955     Office/Outpatient Visit    Visit Date: Wed, Mar 7, 2018 04:36 pm    Provider: Cleopatra Higginbotham MD (Assistant: Kellen Espinoza MA)    Location: [facility]        Electronically signed by Cleopatra Higginbotham MD on  03/07/2018 05:17:33 PM                             SUBJECTIVE:        CC:     Herrera is a 63 year old White male.  This is a follow-up visit.  3 month check up; PT STATES CARDIOLOGIST DR MCKINLEY DE LA TORRE PUT HIM ON METOPROLOL SUCCINATE 25MG ER ONE TAB DAILY INSTEAD OF LISINOPRIL         HPI: Herrera is here to follow up on chronic issues.  He is on Adderall XR 30 mg daily for ADD.  Sx are well controlled.  He has been stable on this dose for years now.  No adverse effects.  He was originally diagnosed by Dr. Zeng years ago.  He does not take drug holidays.        He is on metoprolol succinate for HTN after his cardiologist switch him from lisinopril.  He had some arrhythmia that showed up during his colonoscopy so they sent him to a cardiologist.  He had an echo done which was completely fine.  Dr. De La Torre did not feel that he needs to come back at this time.  BP has been well controlled.  No CP, palpitations, SOB.        He is on fluoxetine for depression.  Sx have been well controlled.     ROS:     CONSTITUTIONAL:  Negative for chills and fever.      EYES:  Negative for blurred vision.      E/N/T:  Negative for diminished hearing and nasal congestion.      CARDIOVASCULAR:  Negative for chest pain and palpitations.      RESPIRATORY:  Negative for recent cough and dyspnea.      GASTROINTESTINAL:  Negative for abdominal pain, anorexia, constipation, diarrhea, nausea and vomiting.      MUSCULOSKELETAL:  Negative for arthralgias, limb pain and myalgias.      PSYCHIATRIC:  Negative for anxiety, depression, feelings of stress, anhedonia, difficulty concentrating and sleep disturbance.          PMH/FMH/SH:     Last Reviewed on 12/07/2017 04:46 PM by Cleopatra Higginbotham     Past Medical History:     UNREMARKABLE         PREVENTIVE HEALTH MAINTENANCE             COLORECTAL CANCER SCREENING: Up to date (colonoscopy q10y; sigmoidoscopy q5y; Cologuard q3y) was last done 2018, Results are in chart; colonoscopy with normal results; poor prep         Surgical History:         Cholecystectomy: ;     Joint Replacement: L TKA; 2016;      Tonsillectomy/Adenoidectomy    ortho right hand;     Positive for    Cataract Removal: bilateral; ;     Positive for    Colonoscopy ( , NEG; ) and    EGD -GASTRITIS;;         Family History:     Father:  at age 80;  Myocardial Infarction;  brain tumor     Mother:  at age 78; Cause of death was pneumonia;  Lymphoma     Brother(s): Healthy; 1 brother(s) total         Social History:     Occupation: fire rahel;     Marital Status:      Children: 2 children         Tobacco/Alcohol/Supplements:     Last Reviewed on 2017 04:46 PM by Cleopatra Higginbotham    Tobacco: He has a past history of cigarette smoking; quit date:  1/15/2012.  Non-drinker     Caffeine:  He admits to consuming caffeine via coffee ( 6 servings per day ).          Substance Abuse History:     Last Reviewed on 2017 04:46 PM by Cleopatra Higginbotham        Mental Health History:     Last Reviewed on 2017 04:46 PM by Cleopatra Higginbotham        Communicable Diseases (eg STDs):     Last Reviewed on 2017 04:46 PM by Cleopatra Higginbotham            Current Problems:     Last Reviewed on 2017 04:46 PM by Cleopatra Higginbotham    History of noncompliance with medical treatment     Osteoarthritis of knee     ADD     Depression     Hypertension     History of tobacco abuse     Other high-risk medications     Nodular prostate, without urinary obstruction     Tachycardia, unspecified     Artificial joint replacement, Knee     Unspecified anemia     Hydrocele, other specified type     Other specified gastritis, without mention of hemorrhage     Iron deficiency anemia          Immunizations:     Fluarix pf 10/1/2016     Fluzone (3 + years dose) 10/16/2017         Allergies:     Last Reviewed on 12/07/2017 04:46 PM by Cleopatra Higginbotham    Versed:    Ambien:        Current Medications:     Last Reviewed on 12/07/2017 04:46 PM by Cleopatra Higginbotham    Adderall XR 30mg Capsules, Extended Release 1 tab daily     Lisinopril 10mg Tablet Take 1 tablet(s) by mouth daily     Prozac 40mg Capsules Take 1 capsule(s) by mouth         OBJECTIVE:        Vitals:         Current: 3/7/2018 4:39:47 PM    Ht:  5 ft, 6 in;  Wt: 210.4 lbs;  BMI: 34.0    T: 97.6 F (oral);  BP: 118/79 mm Hg (left arm, sitting);  P: 91 bpm (left arm (BP Cuff), sitting);  sCr: 0.87 mg/dL;  GFR: 88.53        Exams:     PHYSICAL EXAM:     GENERAL: Vitals recorded well developed, well nourished;  well groomed;  no apparent distress;     EYES: extraocular movements intact; conjunctiva and cornea are normal; PERRLA;     E/N/T: OROPHARYNX: posterior pharynx shows no exudate and erythema;     RESPIRATORY: normal respiratory rate and pattern with no distress; normal breath sounds with no rales, rhonchi, wheezes or rubs;     CARDIOVASCULAR: normal rate; rhythm is regular;  no systolic murmur; no edema;     GASTROINTESTINAL: nontender; normal bowel sounds;     MUSCULOSKELETAL: normal gait; normal overall tone     PSYCHIATRIC: appropriate affect and demeanor; normal psychomotor function; normal speech pattern; normal thought and perception;         Lab/Test Results:             Amphetamines Screen, Urin:  Positive (03/07/2018),     BAR-Barbiturates Screen, Urin:  Negative (03/07/2018),     Buprenorphine:  Negative (03/07/2018),     BZO-Benzodiazepines Screen,Ur:  Negative (03/07/2018),     Cocaine(Metab.)Screen, Ur:  Negative (03/07/2018),     MDMA-Ecstasy:  Negative (03/07/2018),     Met-Methamphetamine:  Negative (03/07/2018),     MTD-Methadone Screen, Urine:  Negative (03/07/2018),     Opiate Screen, Urine:  Negative (03/07/2018),      OXY-Oxycodone:  Negative (03/07/2018),     PCP-Phencyclidine Screen, Uri:  Negative (03/07/2018),     THC Cannabinoids Screen, Urin:  Negative (03/07/2018),     Urine temperature:  confirmed (03/07/2018),     Date and time of last pill:  adderall 3/7/18 @ 630am (03/07/2018),     Performed by:  Luis Carlos (03/07/2018),     Collection Time:  5:12pm (03/07/2018),             ASSESSMENT           314.00   F90.0  ADD              DDx:     V58.69   Z79.899  Other high-risk medications              DDx:     296.20   F34.9  Depression              DDx:     401.1   I10  Hypertension              DDx:         ORDERS:         Meds Prescribed:       Refill of: Adderall XR (Amphetamine Aspartate-Sulf/Dextroamphetamine Saccharate-Sulf) 30mg Capsules, Extended Release 1 tab daily  #30 (Thirty) capsule(s) Refills: 0       Refill of: Prozac (Fluoxetine) 40mg Capsules Take 1 capsule(s) by mouth  #90 (Ninety) capsule(s) Refills: 1         Lab Orders:       07388  Drug test prsmv qual dir optical obs per day  (In-House)                   PLAN:          ADD Stable on Adderall.  He has been on this regimen for years.  No adverse effects.  He does require ongoing use of this controlled substance to function.  Refills sent as below.  Tox screen and Christopher run.  RTC 3 months after I get back from maternity leave.         FOLLOW-UP: Schedule a follow-up visit in 3 months..            Prescriptions:       Refill of: Adderall XR (Amphetamine Aspartate-Sulf/Dextroamphetamine Saccharate-Sulf) 30mg Capsules, Extended Release 1 tab daily  #30 (Thirty) capsule(s) Refills: 0           Patient Education Handouts:       [facility] Medication Compliance           Other high-risk medications         FOLLOW-UP: Schedule a follow-up visit in 3 months..  Controlled substance documentation: Christopher reviewed; drug screen performed and appropriate; consent is reviewed and signed and on the chart.  He is aware of risk of addiction on this medication, understands that he  will need to follow up for a review every 3 months and his medications will be adjusted or decreased as deemed appropriate at each visit.  No history of drug or alcohol abuse.  No concerns about diversion or abuse. He denies side effects related to the medication.  He is aware that he may be called in for pill counts.  The dosing of this medication will be reviewed on a regular basis and reduced if possible..  Ongoing use of a controlled substance is necessary for this patient to have a normal quality of life Drug screen           Orders:       06973  Drug test prsmv qual dir optical obs per day  (In-House)            Depression Stable.  Refills sent.           Prescriptions:       Refill of: Prozac (Fluoxetine) 40mg Capsules Take 1 capsule(s) by mouth  #90 (Ninety) capsule(s) Refills: 1          Hypertension BP at goal.  Doing well on metoprolol succinate since he had that switched.  No refills needed.             Patient Recommendations:        For  ADD:     Schedule a follow-up visit in 3 months.                APPOINTMENT INFORMATION:        Monday Tuesday Wednesday Thursday Friday Saturday Sunday            Time:___________________AM  PM   Date:_____________________         For  Other high-risk medications:     Schedule a follow-up visit in 3 months.                APPOINTMENT INFORMATION:        Monday Tuesday Wednesday Thursday Friday Saturday Sunday            Time:___________________AM  PM   Date:_____________________             CHARGE CAPTURE           **Please note: ICD descriptions below are intended for billing purposes only and may not represent clinical diagnoses**        Primary Diagnosis:         314.00 ADD            F90.0    Attention-deficit hyperactivity disorder, predominantly inattentive type              Orders:          73963   Office/outpatient visit; established patient, level 4  (In-House)           V58.69 Other high-risk medications            Z79.899    Other long term  (current) drug therapy              Orders:          66043   Drug test prsmv qual dir optical obs per day  (In-House)           296.20 Depression            F34.9    Persistent mood [affective] disorder, unspecified    401.1 Hypertension            I10    Essential (primary) hypertension

## 2021-05-18 NOTE — PROGRESS NOTES
Corey Velasquez  1955     Office/Outpatient Visit    Visit Date: Tue, Oct 20, 2020 10:04 am    Provider: Mark Osorio MD (Assistant: Nelly Salomon MA)    Location: [facility]        Electronically signed by Mark Osorio MD on  10/20/2020 12:55:49 PM                             Subjective:        CC: Herrera is a 65 year old White male.  This is a follow-up visit.          HPI:       Herrera presents to clinic today for follow-up of ADHD. He has no acute complaints pertaining to this today.  His current regimen includes Adderall XR 30 mg daily.  No adverse effects from the medication.           Additionally, he presents with history of hTN - Essential (primary) hypertension.  his current cardiac medication regimen includes a beta-blocker ( Toprol-XL 25 mg daily. ).  Compliance with treatment has been good; he takes his medication as directed and follows up as directed.  He is tolerating the medication well without side effects.  He did not bring his blood pressure diary, but says that pressures have been okay.      ROS:     CONSTITUTIONAL:  Negative for fatigue and fever.      EYES:  Negative for blurred vision.      E/N/T:  Negative for ear pain, nasal congestion and frequent rhinorrhea.      CARDIOVASCULAR:  Negative for chest pain and palpitations.      RESPIRATORY:  Negative for recent cough and dyspnea.      GASTROINTESTINAL:  Negative for abdominal pain, anorexia, constipation, diarrhea, nausea and vomiting.      MUSCULOSKELETAL:  Negative for arthralgias and myalgias.      PSYCHIATRIC:  Negative for anxiety, depression, feelings of stress, anhedonia, difficulty concentrating and sleep disturbance.          Past Medical History / Family History / Social History:         Last Reviewed on 10/20/2020 12:55 PM by Mark Osorio    Past Medical History:     UNREMARKABLE         PREVENTIVE HEALTH MAINTENANCE             COLORECTAL CANCER SCREENING: Up to date (colonoscopy q10y; sigmoidoscopy  "q5y; Cologuard q3y) was last done 2018, Results are in chart; colonoscopy with normal results; poor prep     Hepatitis C Medicare Screening: was last done 2017         Surgical History:         Cholecystectomy: ;     Joint Replacement: L TKA; 2016;     Tonsillectomy/Adenoidectomy    ortho right hand;     Positive for    Cataract Removal: bilateral; ;     Positive for    Colonoscopy ( , NEG; ) and    EGD -GASTRITIS;;         Family History:     Father:  at age 80;  Myocardial Infarction;  brain tumor     Mother:  at age 78; Cause of death was pneumonia;  Lymphoma     Brother(s): Healthy; 1 brother(s) total         Social History:     Occupation: fire rahel;     Marital Status:  Rosana \"Archana\"     Children: 2 children         Tobacco/Alcohol/Supplements:     Last Reviewed on 10/20/2020 12:55 PM by Mark Osorio    Tobacco: He has a past history of cigarette smoking; quit date:  1/15/2012.  Non-drinker     Caffeine:  He admits to consuming caffeine via coffee ( 6 servings per day ).          Substance Abuse History:     Last Reviewed on 10/20/2020 12:55 PM by Mark Osorio        Mental Health History:     Last Reviewed on 10/20/2020 12:55 PM by Mark Osorio        Communicable Diseases (eg STDs):     Last Reviewed on 10/20/2020 12:55 PM by Mark Osorio        Current Problems:     Last Reviewed on 10/20/2020 12:55 PM by Mark Osorio    ADD - Attention-deficit hyperactivity disorder, predominantly inattentive type    Unilateral primary osteoarthritis, left knee    Other long term (current) drug therapy    Anemia, unspecified    Depression - Major depressive disorder, single episode, mild    Other hydrocele    Tachycardia, unspecified    Rash and other nonspecific skin eruption    Cough    HTN - Essential (primary) hypertension        Immunizations:     Hep A, adult dose 2018    Fluarix pf 10/1/2016    Fluzone (3 + years dose) 10/16/2017    Fluzone Quadrivalent (3+ years) " 12/10/2018    Fluzone Quadrivalent (3+ years) 10/1/2019    Tdap (Tetanus, reduced diph, acellular pertussis) 10/1/2019    Shingrix (Zoster recombinant) 12/4/2018    influenza, injectable, quadrivalent 10/1/2020        Allergies:     Last Reviewed on 10/20/2020 12:55 PM by Mark Osorio    Versed:      Ambien:      hepatits a vaccine:          Current Medications:     Last Reviewed on 10/20/2020 12:55 PM by Mark Osorio    PROzac 40 mg oral capsule [TAKE ONE CAPSULE BY MOUTH DAILY]    Adderall XR 30 mg oral Capsule, Extended Release 24 hr [1 tab daily]    Metoprolol Succinate 25 mg oral Tablet, Extended Release 24 hr [1 tab PO daily]    OTC Iron Supplement 28mg Take one tablet once daily      OTC Vitamin B Complex w/ Vitamin C Take one tablet once daily      OTC Advil Take two tablets in the morning      meloxicam 15 mg oral tablet [TAKE ONE TABLET BY MOUTH DAILY WITH FOOD AS NEEDED]    mupirocin 2 % Topical Ointment [apply a small amount to the affected area by topical route 2 times per day]    acyclovir 5 % Topical Ointment [TID prn]        Objective:        Vitals:         Current: 10/20/2020 10:09:43 AM    Ht:  5 ft, 6 in;  Wt: 221.8 lbs;  BMI: 35.8T: 96.3 F (temporal);  BP: 146/97 mm Hg (left arm, sitting);  P: 96 bpm (left arm (BP Cuff), sitting);  sCr: 0.92 mg/dL;  GFR: 83.52        Repeat:     10:27:23 AM  BP:   147/87mm Hg (left arm, sitting, P-92)     Exams:     PHYSICAL EXAM:     GENERAL: well developed, well nourished;  well groomed;  no apparent distress;     EYES: extraocular movements intact; conjunctiva and cornea are normal; PERRLA;     RESPIRATORY: normal respiratory rate and pattern with no distress;     MUSCULOSKELETAL: normal overall tone     NEUROLOGIC: mental status: alert and oriented x 3;     PSYCHIATRIC: appropriate affect and demeanor; normal psychomotor function; normal speech pattern;         Lab/Test Results:         Amphetamines Screen, Urin: Positive (10/20/2020),     BAR-Barbiturates  Screen, Urin: Negative (10/20/2020),     Buprenorphine: Negative (10/20/2020),     BZO-Benzodiazepines Screen,Ur: Positive (10/20/2020),     Cocaine(Metab.)Screen, Ur: Negative (10/20/2020),     MDMA-Ecstasy: Negative (10/20/2020),     Met-Methamphetamine: Negative (10/20/2020),     MTD-Methadone Screen, Urine: Negative (10/20/2020),     Opiate Screen, Urine: Negative (10/20/2020),     OXY-Oxycodone: Negative (10/20/2020),     PCP-Phencyclidine Screen, Uri: Negative (10/20/2020),     THC Cannabinoids Screen, Urin: Negative (10/20/2020),     Urine temperature: confirmed (10/20/2020),     Date and time of last pill: adderall 10/20/20 @ 6am /al (10/20/2020),     Performed by: monico (10/20/2020),     Collection Time: 1015 (10/20/2020),             Assessment:         F90.0   ADD - Attention-deficit hyperactivity disorder, predominantly inattentive type       I10   HTN - Essential (primary) hypertension       Z79.899   Other long term (current) drug therapy           ORDERS:         Lab Orders:       [id number]  Drug test prsmv read direct optical obs pr date  (In-House)            02969  [facility] - H [id number] BENZODIAZEPINES  (Send-Out)                      Plan:         ADD - Attention-deficit hyperactivity disorder, predominantly inattentive type- Stable.  Continue Adderall XR 30 mg daily.        HTN - Essential (primary) hypertension- Borderline/slightly elevated in office today.  However, patient reports that is been in range at home.  He declines medication changes today.  Continue Toprol-XL 25 mg daily.  Continue to monitor closely.        Other long term (current) drug therapy    LABORATORY:  Labs ordered to be performed today include Drug Screen Urine [facility] Confirmation [facility].  Controlled substance documentation: Christopher reviewed; drug screen performed and inconsistent--will send for confirmation; consent is reviewed and signed and on the chart.  He is aware of risk of addiction on this medication, understands that  he will need to follow up for a review every 3 months and his medications will be adjusted or decreased as deemed appropriate at each visit.  No history of drug or alcohol abuse.  No concerns about diversion or abuse. He denies side effects related to the medication.  He is aware that he may be called in for pill counts.  The dosing of this medication will be reviewed on a regular basis and reduced if possible..  Ongoing use of a controlled substance is necessary for this patient to have a normal quality of life           Orders:       [id number]  Drug test prsmv read direct optical obs pr date  (In-House)            00[facility] [id number] [facility]  (Send-Out)                  Charge Capture:         Primary Diagnosis:     F90.0  ADD - Attention-deficit hyperactivity disorder, predominantly inattentive type           Orders:      15231  Office/outpatient visit; established patient, level 4  (In-House)              I10  HTN - Essential (primary) hypertension     Z79.899  Other long term (current) drug therapy           Orders:      28032  Drug test prsmv read direct optical obs pr date  (In-House)

## 2021-05-18 NOTE — PROGRESS NOTES
Corey Velasquez 1955     Office/Outpatient Visit    Visit Date: Wed, Jul 3, 2019 10:03 am    Provider: Cleopatra Higginbotham MD (Assistant: Ting Love)    Location: [facility]        Electronically signed by Cleopatra Higginbotham MD on  07/03/2019 10:34:49 AM                             SUBJECTIVE:        CC:     Herrera is a 64 year old White male.  This is a follow-up visit.  ADD, Medication refills Patient requesting Rx for Meloxicam for right arm pain.          HPI: Herrera is here today for routine follow-up on chronic issues.          He is on Adderall XR 30 mg daily for ADD.  He has been stable on this regimen for years now.  Sx of concentration and focus well controlled.  No adverse effects.  He was originally diagnosed by Dr. Zeng years ago.  He does not take drug holidays.  He came in for a drug screen about 3 weeks ago, which was appropriate.        He is on metoprolol succinate for HTN.  BP has been well controlled.  No CP, SOB, palpitations.  He follows with Cardiology.        He is on Prozac for depression.  Sx have been well controlled although he has been under stress lately with his wife Archana's mental health issues.  No depression or anhedonia.  No anxiety or panic attacks.         He is having some right arm pain.  Would like to try some meloxicam for this.         Patient presents with screening for depression.          PHQ-9 Depression Screening: Completed form scanned and in chart; Total Score 0 Alcohol Consumption Screening: Completed form scanned and in chart; Total Score 0     ROS:     CONSTITUTIONAL:  Negative for chills and fever.      EYES:  Negative for blurred vision.      CARDIOVASCULAR:  Negative for chest pain and palpitations.      RESPIRATORY:  Negative for recent cough and dyspnea.      GASTROINTESTINAL:  Negative for abdominal pain, anorexia, constipation, diarrhea, nausea and vomiting.      MUSCULOSKELETAL:  Negative for arthralgias and myalgias.       "PSYCHIATRIC:  Negative for anxiety, depression, feelings of stress, anhedonia, difficulty concentrating and sleep disturbance.          PMH/FMH/SH:     Last Reviewed on 2019 10:21 AM by Cleopatra Higginbotham    Past Medical History:     UNREMARKABLE         PREVENTIVE HEALTH MAINTENANCE             COLORECTAL CANCER SCREENING: Up to date (colonoscopy q10y; sigmoidoscopy q5y; Cologuard q3y) was last done 2018, Results are in chart; colonoscopy with normal results; poor prep     Hepatitis C Medicare Screening: was last done 2017         Surgical History:         Cholecystectomy: ;     Joint Replacement: L TKA; 2016;      Tonsillectomy/Adenoidectomy    ortho right hand;     Positive for    Cataract Removal: bilateral; ;     Positive for    Colonoscopy ( , NEG; ) and    EGD -GASTRITIS;;         Family History:     Father:  at age 80;  Myocardial Infarction;  brain tumor     Mother:  at age 78; Cause of death was pneumonia;  Lymphoma     Brother(s): Healthy; 1 brother(s) total         Social History:     Occupation: fire rahel;     Marital Status:  Rosana \"Archana\"     Children: 2 children         Tobacco/Alcohol/Supplements:     Last Reviewed on 2019 10:21 AM by Cleopatra Higginbotham    Tobacco: He has a past history of cigarette smoking; quit date:  1/15/2012.  Non-drinker     Caffeine:  He admits to consuming caffeine via coffee ( 6 servings per day ).          Substance Abuse History:     Last Reviewed on 2019 10:21 AM by Cleopatra Higginbotham        Mental Health History:     Last Reviewed on 2019 10:21 AM by Cleopatra Higginbotham        Communicable Diseases (eg STDs):     Last Reviewed on 2019 10:21 AM by Cleopatra Higginbotham            Current Problems:     Last Reviewed on 3/11/2019 10:23 AM by Cleopatra Higginbotham    Use of high risk medications     History of noncompliance with medical treatment     Osteoarthritis of knee     ADD     Depression     Hypertension     Other high-risk " medications     Nodular prostate, without urinary obstruction     Tachycardia, unspecified     Artificial joint replacement, Knee     Unspecified anemia     Hydrocele, other specified type     Other specified gastritis, without mention of hemorrhage         Immunizations:     Hep A, adult dose 9/4/2018     Fluarix pf 10/1/2016     Fluzone (3 + years dose) 10/16/2017     Fluzone Quadrivalent (3+ years) 12/10/2018         Allergies:     Last Reviewed on 7/03/2019 10:03 AM by Ting Love    Versed:    Ambien:        Current Medications:     Last Reviewed on 7/03/2019 10:11 AM by Ting Love    Adderall XR 30mg Capsules, Extended Release 1 tab daily     Prozac 40mg Capsules Take 1 capsule(s) by mouth     Metoprolol Succinate 25mg Tablets, Extended Release 1 tab PO daily     OTC Advil Take two tablets in the morning     OTC Iron Supplement 28mg Take one tablet once daily     OTC Vitamin B Complex w/ Vitamin C Take one tablet once daily         OBJECTIVE:        Vitals:         Current: 7/3/2019 10:09:22 AM    Ht:  5 ft, 6 in;  Wt: 216 lbs;  BMI: 34.9    T: 98.6 F (oral);  BP: 123/79 mm Hg (left arm, sitting);  P: 84 bpm (left arm (BP Cuff), sitting);  sCr: 0.82 mg/dL;  GFR: 93.82    O2 Sat: 99 % (room air)        Exams:     PHYSICAL EXAM:     GENERAL: Vitals recorded well developed, well nourished;  well groomed;  no apparent distress;     EYES: extraocular movements intact; conjunctiva and cornea are normal; PERRLA;     E/N/T: OROPHARYNX: posterior pharynx shows no exudate and erythema;     RESPIRATORY: normal respiratory rate and pattern with no distress; normal breath sounds with no rales, rhonchi, wheezes or rubs;     CARDIOVASCULAR: normal rate; rhythm is regular;  no systolic murmur; no edema;     MUSCULOSKELETAL: normal gait; normal overall tone     PSYCHIATRIC: appropriate affect and demeanor; normal psychomotor function; normal speech pattern; normal thought and perception;         ASSESSMENT            314.00   F90.0  ADD              DDx:     V58.69   Z79.899  Use of high risk medications              DDx:     296.20   F34.9  Depression              DDx:     729.5   M79.601  Arm pain              DDx:     V79.0   Z13.89  Screening for depression              DDx:     401.1   I10  Hypertension              DDx:     V79.0   Z13.89  Screening for depression              DDx:         ORDERS:         Meds Prescribed:       Meloxicam 15mg Tablet 1 po qd wf prn  #45 (Forty Five) tablet(s) Refills: 1       Refill of: Metoprolol Succinate 25mg Tablets, Extended Release 1 tab PO daily  #90 (Ninety) tablet(s) Refills: 3         Radiology/Test Orders:       3017F  Colorectal CA screen results documented and reviewed (PV)  (In-House)           Lab Orders:       APPTO  Appointment need  (In-House)           Other Orders:         Depression screen negative  (In-House)           Negative EtOH screen  (In-House)                   PLAN:          ADD He is stable on his current regimen.  Concentration is well controlled.  No adverse effects.  He does require ongoing use of this controlled substance to function.  Prior tox screen 3 weeks ago was appropriate; Christopher run today.  No refills needed today.  RTC 3 months for annual physical.     MIPS PHQ-9 Depression Screening: Completed form scanned and in chart; Total Score 0; Negative Depression Screen Negative alcohol screen     FOLLOW-UP: Schedule a follow-up visit in 3 months..  annual physical 30 min with Malcomialice           Orders:       3017F  Colorectal CA screen results documented and reviewed (PV)  (In-House)         APPTO  Appointment need  (In-House)           Depression screen negative  (In-House)           Negative EtOH screen  (In-House)            Use of high risk medications     Controlled substance documentation: Christopher reviewed; prior drug screen consistent; consent is reviewed and signed and on the chart.  He is aware of risk of addiction on  this medication, understands that he will need to follow up for a review every 3 months and his medications will be adjusted or decreased as deemed appropriate at each visit.  No history of drug or alcohol abuse.  No concerns about diversion or abuse. He denies side effects related to the medication.  He is aware that he may be called in for pill counts.  The dosing of this medication will be reviewed on a regular basis and reduced if possible..  Ongoing use of a controlled substance is necessary for this patient to have a normal quality of life          Depression Stable.  Doing well on Prozac.  No refills needed today.          Arm pain H/o R biceps tendon tear.  Rx sent for meloxicam.           Prescriptions:       Meloxicam 15mg Tablet 1 po qd wf prn  #45 (Forty Five) tablet(s) Refills: 1          Hypertension BP at goal.  Refills sent.  Plan to check labs at annual physical next visit.           Prescriptions:       Refill of: Metoprolol Succinate 25mg Tablets, Extended Release 1 tab PO daily  #90 (Ninety) tablet(s) Refills: 3             Patient Recommendations:        For  ADD:     Schedule a follow-up visit in 3 months.                APPOINTMENT INFORMATION:        Monday Tuesday Wednesday Thursday Friday Saturday Sunday            Time:___________________AM  PM   Date:_____________________             CHARGE CAPTURE           **Please note: ICD descriptions below are intended for billing purposes only and may not represent clinical diagnoses**        Primary Diagnosis:         314.00 ADD            F90.0    Attention-deficit hyperactivity disorder, predominantly inattentive type              Orders:          64062   Office/outpatient visit; established patient, level 4  (In-House)             3017F   Colorectal CA screen results documented and reviewed (PV)  (In-House)             APPTO   Appointment need  (In-House)                Depression screen negative  (In-House)                 Negative EtOH screen  (In-House)           V58.69 Use of high risk medications            Z79.899    Other long term (current) drug therapy    296.20 Depression            F34.9    Persistent mood [affective] disorder, unspecified    729.5 Arm pain            M79.601    Pain in right arm    V79.0 Screening for depression            Z13.89    Encounter for screening for other disorder    401.1 Hypertension            I10    Essential (primary) hypertension    V79.0 Screening for depression            Z13.89    Encounter for screening for other disorder

## 2021-05-18 NOTE — PROGRESS NOTES
Corey Velasquez 1955     Office/Outpatient Visit    Visit Date: Mon, Jun 4, 2018 11:47 am    Provider: Cleopatra Higginbotham MD (Assistant: Meredith Miller MA)    Location: [facility]        Electronically signed by Cleopatra Higginbotham MD on  06/04/2018 12:26:58 PM                             SUBJECTIVE:        CC:     Herrera is a 63 year old White male.  This is a follow-up visit.  3 MONTHS, MED REFILLS ADD, depression         HPI: Herrera is here today for routine f/u of chronic issues.          He is on Adderall XR for ADD.  He has been on this dose for many years now, and sx have been well controlled.  He has had problems with concentration and focus when he has gone off the medication.  No adverse effects.  He was originally diagnosed by Dr. Zeng years ago.  He does not take drug holidays.        He is on metoprolol succinate for HTN.  BP has been well controlled.  No CP, SOB, palpitations.  He follows with Cardiology.        He is on fluoxetine for depression.  Sx have been well controlled.  No depression or anxiety, no anhedonia.     ROS:     CONSTITUTIONAL:  Negative for chills and fever.      EYES:  Negative for blurred vision.      E/N/T:  Negative for diminished hearing and nasal congestion.      CARDIOVASCULAR:  Negative for chest pain and palpitations.      RESPIRATORY:  Negative for recent cough and dyspnea.      GASTROINTESTINAL:  Negative for abdominal pain, anorexia, constipation, diarrhea, nausea and vomiting.      MUSCULOSKELETAL:  Positive for R hand pain -- sharp, wraps around dorsal to ventral of 3rd and 4th digits.   Negative for arthralgias, limb pain or myalgias.      PSYCHIATRIC:  Negative for anxiety, depression, feelings of stress, anhedonia, difficulty concentrating and sleep disturbance.          PMH/FMH/SH:     Last Reviewed on 6/04/2018 11:53 AM by Cleopatra Higginbotham    Past Medical History:     UNREMARKABLE         PREVENTIVE HEALTH MAINTENANCE             COLORECTAL  CANCER SCREENING: Up to date (colonoscopy q10y; sigmoidoscopy q5y; Cologuard q3y) was last done 2018, Results are in chart; colonoscopy with normal results; poor prep         Surgical History:         Cholecystectomy: ;     Joint Replacement: L TKA; 2016;      Tonsillectomy/Adenoidectomy    ortho right hand;     Positive for    Cataract Removal: bilateral; ;     Positive for    Colonoscopy ( , NEG; ) and    EGD -GASTRITIS;;         Family History:     Father:  at age 80;  Myocardial Infarction;  brain tumor     Mother:  at age 78; Cause of death was pneumonia;  Lymphoma     Brother(s): Healthy; 1 brother(s) total         Social History:     Occupation: fire rahel;     Marital Status:      Children: 2 children         Tobacco/Alcohol/Supplements:     Last Reviewed on 2018 11:53 AM by Cleopatra Higginbotham    Tobacco: He has a past history of cigarette smoking; quit date:  1/15/2012.  Non-drinker     Caffeine:  He admits to consuming caffeine via coffee ( 6 servings per day ).          Substance Abuse History:     Last Reviewed on 2018 11:53 AM by Cleopatra Higginbotham        Mental Health History:     Last Reviewed on 2018 11:53 AM by Cleopatra Higginbotham        Communicable Diseases (eg STDs):     Last Reviewed on 2018 11:53 AM by Cleopatra Higginbotham            Current Problems:     Last Reviewed on 2017 04:46 PM by Cleopatra Higginbotham    History of noncompliance with medical treatment     Osteoarthritis of knee     ADD     Depression     Hypertension     History of tobacco abuse     Other high-risk medications     Nodular prostate, without urinary obstruction     Tachycardia, unspecified     Artificial joint replacement, Knee     Unspecified anemia     Hydrocele, other specified type     Other specified gastritis, without mention of hemorrhage     Iron deficiency anemia         Immunizations:     Fluarix pf 10/1/2016     Fluzone (3 + years dose) 10/16/2017         Allergies:      Last Reviewed on 3/07/2018 04:40 PM by Kellen Espinoza    Versed:    Alexien:        Current Medications:     Last Reviewed on 3/07/2018 04:40 PM by Kellen Espinoza    Adderall XR 30mg Capsules, Extended Release 1 tab daily     Prozac 40mg Capsules Take 1 capsule(s) by mouth     Metoprolol Succinate 25mg Tablets, Extended Release 1 tab PO daily         OBJECTIVE:        Vitals:         Current: 6/4/2018 11:48:47 AM    Ht:  5 ft, 6 in;  Wt: 209.8 lbs;  BMI: 33.9    T: 99.1 F (oral);  BP: 130/81 mm Hg (left arm, sitting);  P: 105 bpm (left arm (BP Cuff), sitting);  sCr: 0.87 mg/dL;  GFR: 88.43        Exams:     PHYSICAL EXAM:     GENERAL: Vitals recorded well developed, well nourished;  well groomed;  no apparent distress;     EYES: extraocular movements intact; conjunctiva and cornea are normal; PERRLA;     E/N/T: OROPHARYNX: posterior pharynx shows no exudate and erythema;     RESPIRATORY: normal respiratory rate and pattern with no distress; normal breath sounds with no rales, rhonchi, wheezes or rubs;     CARDIOVASCULAR: normal rate; rhythm is regular;  no systolic murmur; no edema;     GASTROINTESTINAL: nontender; normal bowel sounds;     MUSCULOSKELETAL: normal gait; normal overall tone     PSYCHIATRIC: appropriate affect and demeanor; normal psychomotor function; normal speech pattern; normal thought and perception;         Lab/Test Results:             Amphetamines Screen, Urin:  Positive (06/04/2018),     BAR-Barbiturates Screen, Urin:  Negative (06/04/2018),     Buprenorphine:  Negative (06/04/2018),     BZO-Benzodiazepines Screen,Ur:  Negative (06/04/2018),     Cocaine(Metab.)Screen, Ur:  Negative (06/04/2018),     MDMA-Ecstasy:  Negative (06/04/2018),     Met-Methamphetamine:  Negative (06/04/2018),     MTD-Methadone Screen, Urine:  Negative (06/04/2018),     Opiate Screen, Urine:  Negative (06/04/2018),     OXY-Oxycodone:  Negative (06/04/2018),     PCP-Phencyclidine Screen, Uri:  Negative  (06/04/2018),     THC Cannabinoids Screen, Urin:  Negative (06/04/2018),     Urine temperature:  confirmed (06/04/2018),     Date and time of last pill:  adderall 6/4/18 @ 630am (06/04/2018),     Performed by:  Luis Carlos (06/04/2018),     Collection Time:  12:00pm (06/04/2018),             ASSESSMENT           314.00   F90.0  ADD              DDx:     V58.69   Z79.899  Other high-risk medications              DDx:     401.1   I10  Hypertension              DDx:     296.20   F34.9  Depression              DDx:         ORDERS:         Meds Prescribed:       Refill of: Adderall XR (Amphetamine Aspartate-Sulf/Dextroamphetamine Saccharate-Sulf) 30mg Capsules, Extended Release 1 tab daily  #30 (Thirty) capsule(s) Refills: 0         Lab Orders:       80141  Drug test prsmv qual dir optical obs per day  (In-House)         APPTO  Appointment need  (In-House)                   PLAN:          ALONA Silverman is stable on his current dose of Adderall.  Sx are well controlled.  No adverse effects.  He does require ongoing use of this controlled substance to function.  Tox screen and Christopher run today.  Refills sent.  RTC 3 months for annual physical.         FOLLOW-UP: Schedule a follow-up visit in 3 months..  annual physical 30 min (and controlled substance f/u) with Neftaly           Prescriptions:       Refill of: Adderall XR (Amphetamine Aspartate-Sulf/Dextroamphetamine Saccharate-Sulf) 30mg Capsules, Extended Release 1 tab daily  #30 (Thirty) capsule(s) Refills: 0           Orders:       APPTO  Appointment need  (In-House)             Patient Education Handouts:       [facility] Medication Compliance           Other high-risk medications     Controlled substance documentation: Christopher reviewed; drug screen performed and appropriate; consent is reviewed and signed and on the chart.  He is aware of risk of addiction on this medication, understands that he will need to follow up for a review every 3 months and his medications will be adjusted  or decreased as deemed appropriate at each visit.  No history of drug or alcohol abuse.  No concerns about diversion or abuse. He denies side effects related to the medication.  He is aware that he may be called in for pill counts.  The dosing of this medication will be reviewed on a regular basis and reduced if possible..  Ongoing use of a controlled substance is necessary for this patient to have a normal quality of life Drug screen           Orders:       [id number]  Drug test prsmv qual dir optical obs per day  (In-House)            Hypertension BP at goal.  No refills needed.  Follows with Cardiology.  Plan to check labs at annual physical next visit.          Depression Stable.  No refills needed.             Patient Recommendations:        For  ADD:     Schedule a follow-up visit in 3 months.                APPOINTMENT INFORMATION:        Monday Tuesday Wednesday Thursday Friday Saturday Sunday            Time:___________________AM  PM   Date:_____________________             CHARGE CAPTURE           **Please note: ICD descriptions below are intended for billing purposes only and may not represent clinical diagnoses**        Primary Diagnosis:         314.00 ADD            F90.0    Attention-deficit hyperactivity disorder, predominantly inattentive type              Orders:          31016   Office/outpatient visit; established patient, level 4  (In-House)             APPTO   Appointment need  (In-House)           V58.69 Other high-risk medications            Z79.899    Other long term (current) drug therapy              Orders:          65499   Drug test prsmv qual dir optical obs per day  (In-House)           401.1 Hypertension            I10    Essential (primary) hypertension    296.20 Depression            F34.9    Persistent mood [affective] disorder, unspecified

## 2021-05-18 NOTE — PROGRESS NOTES
Corey Velasquez  1955     Office/Outpatient Visit    Visit Date: Wed, Jan 20, 2021 04:37 pm    Provider: Mark Osorio MD (Assistant: Arcelia Alvarenga MA)    Location: [facility]        Electronically signed by Mark Osorio MD on  01/20/2021 06:54:24 PM                             Subjective:        CC: Herrera is a 66 year old White male.  This is a follow-up visit.          HPI:       Herrera presents to clinic today for follow-up of ADHD. He has no acute complaints pertaining to this today.  His current regimen includes Adderall XR 30 mg daily.  No adverse effects from the medication.           With regard to the hTN - Essential (primary) hypertension, his current cardiac medication regimen includes a beta-blocker ( Toprol-XL 25 mg daily. ).  Compliance with treatment has been good; he takes his medication as directed and follows up as directed.  He is tolerating the medication well without side effects.  He did not bring his blood pressure diary, but says that pressures have been okay.      ROS:     CONSTITUTIONAL:  Negative for fatigue and fever.      EYES:  Negative for blurred vision.      E/N/T:  Negative for ear pain, nasal congestion and frequent rhinorrhea.      CARDIOVASCULAR:  Negative for chest pain and palpitations.      RESPIRATORY:  Negative for recent cough and dyspnea.      GASTROINTESTINAL:  Negative for abdominal pain, anorexia, constipation, diarrhea, nausea and vomiting.      MUSCULOSKELETAL:  Negative for arthralgias and myalgias.      PSYCHIATRIC:  Negative for anxiety, depression, feelings of stress, anhedonia, difficulty concentrating and sleep disturbance.          Past Medical History / Family History / Social History:         Last Reviewed on 1/20/2021 06:54 PM by Mark Osorio    Past Medical History:     UNREMARKABLE         PREVENTIVE HEALTH MAINTENANCE             COLORECTAL CANCER SCREENING: Up to date (colonoscopy q10y; sigmoidoscopy q5y; Cologuard q3y)  "was last done 2018, Results are in chart; colonoscopy with normal results; poor prep     Hepatitis C Medicare Screening: was last done 2017         Surgical History:         Cholecystectomy: ;     Joint Replacement: L TKA; 2016;     Tonsillectomy/Adenoidectomy    ortho right hand;     Positive for    Cataract Removal: bilateral; ;     Positive for    Colonoscopy ( , NEG; ) and    EGD -GASTRITIS;;         Family History:     Father:  at age 80;  Myocardial Infarction;  brain tumor     Mother:  at age 78; Cause of death was pneumonia;  Lymphoma     Brother(s): Healthy; 1 brother(s) total         Social History:     Occupation: fire rahel;     Marital Status:  Rosana \"Archana\"     Children: 2 children         Tobacco/Alcohol/Supplements:     Last Reviewed on 2021 06:54 PM by Mark Osorio    Tobacco: He has a past history of cigarette smoking; quit date:  1/15/2012.  Non-drinker     Caffeine:  He admits to consuming caffeine via coffee ( 6 servings per day ).          Substance Abuse History:     Last Reviewed on 2021 06:54 PM by Mark Osorio        Mental Health History:     Last Reviewed on 2021 06:54 PM by Mark Osorio        Communicable Diseases (eg STDs):     Last Reviewed on 2021 06:54 PM by Mark Osorio        Current Problems:     Last Reviewed on 2021 06:54 PM by Mark Osorio    ADD - Attention-deficit hyperactivity disorder, predominantly inattentive type    Unilateral primary osteoarthritis, left knee    Other long term (current) drug therapy    Other hydrocele    Tachycardia, unspecified    Anemia, unspecified    Depression - Major depressive disorder, single episode, mild    Rash and other nonspecific skin eruption    Cough    HTN - Essential (primary) hypertension        Immunizations:     influenza, injectable, quadrivalent 10/1/2020    Hep A, adult dose 2018    Fluarix pf 10/1/2016    Fluzone (3 + years dose) 10/16/2017    Fluzone " Quadrivalent (3+ years) 12/10/2018    Fluzone Quadrivalent (3+ years) 10/1/2019    Tdap (Tetanus, reduced diph, acellular pertussis) 10/1/2019    Shingrix (Zoster recombinant) 12/4/2018        Allergies:     Last Reviewed on 1/20/2021 06:54 PM by Mark Osorio    Versed:      Ambien:      hepatits a vaccine:          Current Medications:     Last Reviewed on 1/20/2021 06:54 PM by Mark Osorio    PROzac 40 mg oral capsule [TAKE ONE CAPSULE BY MOUTH DAILY]    Metoprolol Succinate 25 mg oral Tablet, Extended Release 24 hr [1 tab PO daily]    OTC Iron Supplement 28mg Take one tablet once daily      OTC Vitamin B Complex w/ Vitamin C Take one tablet once daily      OTC Advil Take two tablets in the morning      meloxicam 15 mg oral tablet [TAKE ONE TABLET BY MOUTH DAILY WITH FOOD AS NEEDED]    mupirocin 2 % Topical Ointment [apply a small amount to the affected area by topical route 2 times per day]    acyclovir 5 % Topical Ointment [TID prn]    Adderall XR 30 mg oral Capsule, Extended Release 24 hr [1 tab daily]        Objective:        Vitals:         Current: 1/20/2021 4:45:18 PM    Ht:  5 ft, 6 in;  Wt: 221.21 lbs;  BMI: 35.7BP: 132/92 mm Hg (left arm, sitting);  P: 93 bpm (left arm (BP Cuff), sitting);  sCr: 0.92 mg/dL;  GFR: 82.37        Exams:     PHYSICAL EXAM:     GENERAL: well developed, well nourished;  well groomed;  no apparent distress;     EYES: extraocular movements intact; conjunctiva and cornea are normal; PERRLA;     RESPIRATORY: normal respiratory rate and pattern with no distress;     MUSCULOSKELETAL: normal overall tone     NEUROLOGIC: mental status: alert and oriented x 3;     PSYCHIATRIC: appropriate affect and demeanor; normal psychomotor function; normal speech pattern;         Lab/Test Results:         Amphetamines Screen, Urin: Positive (01/20/2021),     BAR-Barbiturates Screen, Urin: Negative (01/20/2021),     Buprenorphine: Negative (01/20/2021),     BZO-Benzodiazepines Screen,Ur: Negative  (01/20/2021),     Cocaine(Metab.)Screen, Ur: Negative (01/20/2021),     MDMA-Ecstasy: Negative (01/20/2021),     Met-Methamphetamine: Negative (01/20/2021),     MTD-Methadone Screen, Urine: Negative (01/20/2021),     Opiate Screen, Urine: Negative (01/20/2021),     OXY-Oxycodone: Negative (01/20/2021),     PCP-Phencyclidine Screen, Uri: Negative (01/20/2021),     THC Cannabinoids Screen, Urin: Negative (01/20/2021),     Urine temperature: confirmed (01/20/2021),     Date and time of last pill: adderall 01/20/2021 @ 6am (01/20/2021),     Performed by: junior/greta (01/20/2021),     Collection Time: 446pm (01/20/2021),             Assessment:         F90.0   ADD - Attention-deficit hyperactivity disorder, predominantly inattentive type       I10   HTN - Essential (primary) hypertension       Z79.899   Other long term (current) drug therapy           ORDERS:         Lab Orders:       [id number]  Drug test prsmv read direct optical obs pr date  (In-House)                      Plan:         ADD - Attention-deficit hyperactivity disorder, predominantly inattentive type- Stable.  Continue Adderall XR 30 mg daily.            HTN - Essential (primary) hypertension- Stable. Continue Toprol-XL 25 mg daily.  Continue to monitor closely.        Other long term (current) drug therapy          Orders:       [id number]  Drug test prsmv read direct optical obs pr date  (In-House)                  Charge Capture:         Primary Diagnosis:     F90.0  ADD - Attention-deficit hyperactivity disorder, predominantly inattentive type           Orders:      [id number]  Office/outpatient visit; established patient, level 4  (In-House)              I10  HTN - Essential (primary) hypertension     Z79.899  Other long term (current) drug therapy           Orders:      [id number]  Drug test prsmv read direct optical obs pr date  (In-House)

## 2021-05-18 NOTE — PROGRESS NOTES
Corey Velasquez 1955     Office/Outpatient Visit    Visit Date: Tue, Sep 4, 2018 09:37 am    Provider: Cleopatra Higginbotham MD (Assistant: Ivon Almazan MA)    Location: [facility]        Electronically signed by Cleopatra Higginbotham MD on  09/04/2018 10:57:22 AM                             SUBJECTIVE:        CC:     Herrera is a 63 year old White male.  Patient is here for yearly physical exam and medication refills.          HPI:     He is UTD on colonoscopy, last done 2/2018 and this was normal.  He is due for prostate cancer screening.  PSA last done 9/2017 and this was normal.  He is UTD on flu shot.  He is due for Shingrix, Havrix and tetanus.  He is due for routine labs including  HTN panel and PSA.        He is on Adderall for ADD.  Well controlled on his regimen and has been for years.  No adverse effects. He was originally diagnosed by Dr. Zeng years ago.  He does not take drug holidays.      Health checkup noted.          PHQ-9 Depression Screening: Completed form scanned and in chart; Total Score 0 Alcohol Consumption Screening: Completed form scanned and in chart; Total Score 0     ROS:     CONSTITUTIONAL:  Negative for chills and fever.      EYES:  Negative for blurred vision.      E/N/T:  Negative for diminished hearing and nasal congestion.      CARDIOVASCULAR:  Negative for chest pain and palpitations.      RESPIRATORY:  Negative for recent cough and dyspnea.      GASTROINTESTINAL:  Negative for abdominal pain, anorexia, constipation, diarrhea, nausea and vomiting.      GENITOURINARY:  Negative for nocturia and change in urine stream.      MUSCULOSKELETAL:  Negative for arthralgias and myalgias.      NEUROLOGICAL:  Negative for paresthesias and weakness.      PSYCHIATRIC:  Negative for anxiety, depression, feelings of stress, anhedonia, difficulty concentrating and sleep disturbance.          PMH/FMH/SH:     Last Reviewed on 9/04/2018 09:54 AM by Cleopatra Higginbotham    [facility]  History:     UNREMARKABLE         PREVENTIVE HEALTH MAINTENANCE             COLORECTAL CANCER SCREENING: Up to date (colonoscopy q10y; sigmoidoscopy q5y; Cologuard q3y) was last done 2018, Results are in chart; colonoscopy with normal results; poor prep     Hepatitis C Medicare Screening: was last done 2017         Surgical History:         Cholecystectomy: ;     Joint Replacement: L TKA; 2016;      Tonsillectomy/Adenoidectomy    ortho right hand;     Positive for    Cataract Removal: bilateral; ;     Positive for    Colonoscopy ( , NEG; ) and    EGD -GASTRITIS;;         Family History:     Father:  at age 80;  Myocardial Infarction;  brain tumor     Mother:  at age 78; Cause of death was pneumonia;  Lymphoma     Brother(s): Healthy; 1 brother(s) total         Social History:     Occupation: fire rahel;     Marital Status:      Children: 2 children         Tobacco/Alcohol/Supplements:     Last Reviewed on 2018 09:54 AM by Cleopatra Higginbotham    Tobacco: He has a past history of cigarette smoking; quit date:  1/15/2012.  Non-drinker     Caffeine:  He admits to consuming caffeine via coffee ( 6 servings per day ).          Substance Abuse History:     Last Reviewed on 2018 09:54 AM by Cleopatra Higginbotham        Mental Health History:     Last Reviewed on 2018 09:54 AM by Cleopatra Higginbotham        Communicable Diseases (eg STDs):     Last Reviewed on 2018 09:54 AM by Cleopatra Higginbotham            Current Problems:     Last Reviewed on 2018 11:53 AM by Cleopatra Higginbotham    History of noncompliance with medical treatment     Osteoarthritis of knee     ADD     Depression     Hypertension     History of tobacco abuse     Other high-risk medications     Nodular prostate, without urinary obstruction     Tachycardia, unspecified     Artificial joint replacement, Knee     Unspecified anemia     Hydrocele, other specified type     Other specified gastritis, without mention of  hemorrhage     Iron deficiency anemia         Immunizations:     Fluarix pf 10/1/2016     Fluzone (3 + years dose) 10/16/2017         Allergies:     Last Reviewed on 9/04/2018 09:38 AM by Ivon Almazan    Versed:    Ambien:        Current Medications:     Last Reviewed on 9/04/2018 09:38 AM by Ivon Almazan    Adderall XR 30mg Capsules, Extended Release 1 tab daily     Prozac 40mg Capsules Take 1 capsule(s) by mouth     Metoprolol Succinate 25mg Tablets, Extended Release 1 tab PO daily         OBJECTIVE:        Vitals:         Current: 9/4/2018 9:41:38 AM    Ht:  5 ft, 6 in;  Wt: 211.2 lbs;  WC: 40 inches;  BMI: 34.1    T: 98.7 F (oral);  BP: 130/85 mm Hg (left arm, sitting);  P: 83 bpm (left arm (BP Cuff), sitting);  sCr: 0.87 mg/dL;  GFR: 88.68        Exams:     PHYSICAL EXAM:     GENERAL: Vitals recorded well developed, well nourished;  well groomed;  no apparent distress;     EYES: extraocular movements intact; conjunctiva and cornea are normal; PERRLA;     E/N/T: OROPHARYNX: posterior pharynx shows no exudate and erythema;     RESPIRATORY: normal respiratory rate and pattern with no distress; normal breath sounds with no rales, rhonchi, wheezes or rubs;     CARDIOVASCULAR: normal rate; rhythm is regular;  no systolic murmur; no edema;     GASTROINTESTINAL: nontender; normal bowel sounds;     MUSCULOSKELETAL: normal gait; normal overall tone     NEUROLOGIC: mental status: alert and oriented x 3; reflexes: brachioradialis: 2+; knee jerks: 2+;     PSYCHIATRIC: appropriate affect and demeanor; normal psychomotor function; normal speech pattern; normal thought and perception;         Lab/Test Results:             Amphetamines Screen, Urin:  Positive (09/04/2018),     BAR-Barbiturates Screen, Urin:  Negative (09/04/2018),     BZO-Benzodiazepines Screen,Ur:  Negative (09/04/2018),     Cocaine(Metab.)Screen, Ur:  Negative (09/04/2018),     MDMA-Ecstasy:  Negative (09/04/2018),     Met-Methamphetamine:  Negative  (09/04/2018),     MTD-Methadone Screen, Urine:  Negative (09/04/2018),     Opiate Screen, Urine:  Negative (09/04/2018),     OXY-Oxycodone:  Negative (09/04/2018),     PCP-Phencyclidine Screen, Uri:  Negative (09/04/2018),     THC Cannabinoids Screen, Urin:  Negative (09/04/2018),     Urine temperature:  confirmed (09/04/2018),     Date and time of last pill:  adderall 9/4/18@6:30am /tc (09/04/2018),     Performed by:  jameel (09/04/2018),     Collection Time:  1000am (09/04/2018),             Procedures:     Vaccination against hepatitis A     1. Hepatitis A (adult): 1.0 ml given IM in the left upper arm; administered by tc;  lot number [id number]; expires 01/29/2021             ASSESSMENT           V70.0   Z00.00  Health checkup              DDx:     V79.0   Z13.89  Screening for depression              DDx:     314.00   F90.0  ADD              DDx:     V58.69   Z79.899  Other high-risk medications              DDx:     V05.3   Z23  Vaccination against hepatitis A              DDx:         ORDERS:         Meds Prescribed:       Refill of: Adderall XR (Amphetamine Aspartate-Sulf/Dextroamphetamine Saccharate-Sulf) 30mg Capsules, Extended Release 1 tab daily  #30 (Thirty) capsule(s) Refills: 0       Refill of: Prozac (Fluoxetine) 40mg Capsules Take 1 capsule(s) by mouth  #90 (Ninety) capsule(s) Refills: 3       Refill of: Metoprolol Succinate 25mg Tablets, Extended Release 1 tab PO daily  #90 (Ninety) tablet(s) Refills: 3         Radiology/Test Orders:       3017F  Colorectal CA screen results documented and reviewed (PV)  (In-House)           Lab Orders:       [id number]  Drug test prsmv qual dir optical obs per day  (In-House)         86411  COMP - [facility] Comp. Metabolic Panel  (Send-Out)         08641  DP - [facility] Lipid Panel  (Send-Out)         *  [employer] Medicare screening PSA  (Send-Out)         APPTO  Appointment need  (In-House)         74508  BDCB - [facility] CBC w/o diff  (Send-Out)           Procedures Ordered:       [id number]   HepA vaccine adult dose for intramuscular use  (In-House)         85897  Immunization administration; one vaccine  (In-House)           Other Orders:         Depression screen negative  (In-House)           Negative EtOH screen  (In-House)           Calculated BMI above the upper parameter and a follow-up plan was documented in the medical record  (In-House)                   PLAN:          Health checkup He is UTD on colonoscopy, last done 2/2018 and this was normal.  He is due for prostate cancer screening.  He declines ANDREWS today. PSA last done 9/2017 and this was normal; repeat ordered.  He is UTD on flu shot.  He is due for Shingrix, Havrix and tetanus.  Havrix given today.  Recommend getting Shingrix at the pharmac.  He is due for routine labs including  HTN panel and PSA; ordered.  RTC 3 months for ADD f/u.     LABORATORY:  Labs ordered to be performed today include CBC W/O DIFF, Comprehensive metabolic panel, lipid panel, and PSA.  [facility] PHQ-9 Depression Screening: Completed form scanned and in chart; Total Score 0 Negative alcohol screen     COLORECTAL CANCER SCREENING: Results are in chart     BMI Elevated - Follow-Up Plan: He was provided education on weight loss strategies     FOLLOW-UP: Schedule a follow-up visit in 3 months..            Orders:       [id number]  [facility] Comp. Metabolic Panel  (Send-Out)         94243  [facility] Lipid Panel  (Send-Out)         *  [employer] Medicare screening PSA  (Send-Out)           Depression screen negative  (In-House)           Negative EtOH screen  (In-House)         3017F  Colorectal CA screen results documented and reviewed (PV)  (In-House)           Calculated BMI above the upper parameter and a follow-up plan was documented in the medical record  (In-House)         APPTO  Appointment need  (In-House)         55599  [address] CBC w/o diff  (Send-Out)             Patient Education Handouts:       Physical Exam 60+ year, Male            Screening for depression Score of 0, not suggestive of depression.          ADD Stable on Adderall.  No refills needed.  Sx are well controlled.  He does require ongoing use of this controlled substance to function.  Tox screen and Christopher run today.  RTC 3 months.           Prescriptions:       Refill of: Adderall XR (Amphetamine Aspartate-Sulf/Dextroamphetamine Saccharate-Sulf) 30mg Capsules, Extended Release 1 tab daily  #30 (Thirty) capsule(s) Refills: 0          Other high-risk medications     Controlled substance documentation: Christopher reviewed; drug screen performed and appropriate; consent is reviewed and signed and on the chart.  He is aware of risk of addiction on this medication, understands that he will need to follow up for a review every 3 months and his medications will be adjusted or decreased as deemed appropriate at each visit.  No history of drug or alcohol abuse.  No concerns about diversion or abuse. He denies side effects related to the medication.  He is aware that he may be called in for pill counts.  The dosing of this medication will be reviewed on a regular basis and reduced if possible..  Ongoing use of a controlled substance is necessary for this patient to have a normal quality of life           Orders:       53435  Drug test prsmv qual dir optical obs per day  (In-House)            Vaccination against hepatitis A         IMMUNIZATIONS given today: Hepatitis A.            Orders:       [id number]  HepA vaccine adult dose for intramuscular use  (In-House)         84625  Immunization administration; one vaccine  (In-House)               Other Prescriptions:       Refill of: Prozac (Fluoxetine) 40mg Capsules Take 1 capsule(s) by mouth  #90 (Ninety) capsule(s) Refills: 3       Refill of: Metoprolol Succinate 25mg Tablets, Extended Release 1 tab PO daily  #90 (Ninety) tablet(s) Refills: 3         Patient Recommendations:        For  Health checkup:     Schedule a follow-up visit in 3 months.                 APPOINTMENT INFORMATION:        Monday Tuesday Wednesday Thursday Friday Saturday Sunday            Time:___________________AM  PM   Date:_____________________             CHARGE CAPTURE           **Please note: ICD descriptions below are intended for billing purposes only and may not represent clinical diagnoses**        Primary Diagnosis:         V70.0 Health checkup            Z00.00    Encounter for general adult medical examination without abnormal findings              Orders:          02588   Preventive medicine, established patient, age 40-64 years  (In-House)                Depression screen negative  (In-House)                Negative EtOH screen  (In-House)             3017F   Colorectal CA screen results documented and reviewed (PV)  (In-House)                Calculated BMI above the upper parameter and a follow-up plan was documented in the medical record  (In-House)             APPTO   Appointment need  (In-House)           V79.0 Screening for depression            Z13.89    Encounter for screening for other disorder    314.00 ADD            F90.0    Attention-deficit hyperactivity disorder, predominantly inattentive type    V58.69 Other high-risk medications            Z79.899    Other long term (current) drug therapy              Orders:          80590   Drug test prsmv qual dir optical obs per day  (In-House)           V05.3 Vaccination against hepatitis A            Z23    Encounter for immunization              Orders:          99055   HepA vaccine adult dose for intramuscular use  (In-House)             08973   Immunization administration; one vaccine  (In-House)

## 2021-05-18 NOTE — PROGRESS NOTES
Corey Velasquez 1955     Office/Outpatient Visit    Visit Date: Mon, Mar 11, 2019 10:03 am    Provider: Cleopatra Higginbotham MD (Assistant: Lyric Croft MA)    Location: [facility]        Electronically signed by Cleopatra Higginbotham MD on  03/11/2019 10:42:42 AM                             SUBJECTIVE:        CC:     Herrera is a 64 year old White male.  This is a follow-up visit.  Patient presents today for three month follow up         HPI: Herrera is here today for routine follow-up on chronic issues.          He is on Adderall XR for ADD.  He has been stable on this regimen for years now  with focus and concentration well controlled.  No adverse effects.  He was originally diagnosed by Dr. Zeng years ago.  He does not take drug holidays.        He is on metoprolol succinate for HTN.  BP has been well controlled.  No CP, SOB, palpitations.  He follows with Cardiology.        He is on fluoxetine for depression.  Sx well controlled.  No depression or anhedonia.  No anxiety or panic attacks.     ROS:     CONSTITUTIONAL:  Negative for chills and fever.      EYES:  Negative for blurred vision.      CARDIOVASCULAR:  Negative for chest pain and palpitations.      RESPIRATORY:  Negative for recent cough and dyspnea.      GASTROINTESTINAL:  Negative for abdominal pain, anorexia, constipation, diarrhea, nausea and vomiting.      MUSCULOSKELETAL:  Negative for arthralgias and myalgias.      PSYCHIATRIC:  Negative for anxiety, depression, feelings of stress, anhedonia, difficulty concentrating and sleep disturbance.          PMH/FMH/SH:     Last Reviewed on 3/11/2019 10:23 AM by Cleopatra Higginbotham    Past Medical History:     UNREMARKABLE         PREVENTIVE HEALTH MAINTENANCE             COLORECTAL CANCER SCREENING: Up to date (colonoscopy q10y; sigmoidoscopy q5y; Cologuard q3y) was last done 2/9/2018, Results are in chart; colonoscopy with normal results; poor prep     Hepatitis C Medicare Screening: was last  "done 2017         Surgical History:         Cholecystectomy: ;     Joint Replacement: L TKA; 2016;      Tonsillectomy/Adenoidectomy    ortho right hand;     Positive for    Cataract Removal: bilateral; ;     Positive for    Colonoscopy ( , NEG; ) and    EGD -GASTRITIS;;         Family History:     Father:  at age 80;  Myocardial Infarction;  brain tumor     Mother:  at age 78; Cause of death was pneumonia;  Lymphoma     Brother(s): Healthy; 1 brother(s) total         Social History:     Occupation: fire rahel;     Marital Status:  Rosana \"Archana\"     Children: 2 children         Tobacco/Alcohol/Supplements:     Last Reviewed on 3/11/2019 10:23 AM by Cleopatra Higginbotham    Tobacco: He has a past history of cigarette smoking; quit date:  1/15/2012.  Non-drinker     Caffeine:  He admits to consuming caffeine via coffee ( 6 servings per day ).          Substance Abuse History:     Last Reviewed on 3/11/2019 10:23 AM by Cleopatra Higginbotham        Mental Health History:     Last Reviewed on 3/11/2019 10:23 AM by Cleopatra Higginbotham        Communicable Diseases (eg STDs):     Last Reviewed on 3/11/2019 10:23 AM by Cleopatra Higginbotham            Current Problems:     Last Reviewed on 12/10/2018 10:16 AM by Cleopatra Higginbotham    Use of high risk medications     History of noncompliance with medical treatment     Osteoarthritis of knee     ADD     Depression     Hypertension     Other high-risk medications     Nodular prostate, without urinary obstruction     Tachycardia, unspecified     Artificial joint replacement, Knee     Unspecified anemia     Hydrocele, other specified type     Other specified gastritis, without mention of hemorrhage         Immunizations:     Hep A, adult dose 2018     Fluarix pf 10/1/2016     Fluzone (3 + years dose) 10/16/2017     Fluzone Quadrivalent (3+ years) 12/10/2018         Allergies:     Last Reviewed on 12/10/2018 10:16 AM by Cleopatra Higginbotham    Versed:    Bernard:        " Current Medications:     Last Reviewed on 12/10/2018 10:16 AM by Cleopatra Higginbotham    Adderall XR 30mg Capsules, Extended Release 1 tab daily     Prozac 40mg Capsules Take 1 capsule(s) by mouth     OTC Advil Take two tablets in the morning     OTC Iron Supplement 28mg Take one tablet once daily     OTC Vitamin B Complex w/ Vitamin C Take one tablet once daily         OBJECTIVE:        Vitals:         Current: 3/11/2019 10:07:48 AM    Ht:  5 ft, 6 in;  Wt: 215.8 lbs;  BMI: 34.8    T: 98 F (oral);  BP: 144/89 mm Hg (left arm, sitting);  P: 94 bpm (left arm (BP Cuff), sitting);  sCr: 0.82 mg/dL;  GFR: 93.78        Repeat:     10:31:34 AM     BP:   133/85mm Hg (right arm, sitting)         Exams:     PHYSICAL EXAM:     GENERAL: Vitals recorded well developed, well nourished;  well groomed;  no apparent distress;     EYES: extraocular movements intact; conjunctiva and cornea are normal; PERRLA;     E/N/T: OROPHARYNX: posterior pharynx shows no exudate and erythema;     RESPIRATORY: normal respiratory rate and pattern with no distress; normal breath sounds with no rales, rhonchi, wheezes or rubs;     CARDIOVASCULAR: normal rate; rhythm is regular;  no systolic murmur; no edema;     MUSCULOSKELETAL: normal gait; normal overall tone     PSYCHIATRIC: appropriate affect and demeanor; normal psychomotor function; normal speech pattern; normal thought and perception;         Lab/Test Results:             Amphetamines Screen, Urin:  Positive (03/11/2019),     BAR-Barbiturates Screen, Urin:  Negative (03/11/2019),     Buprenorphine:  Negative (03/11/2019),     BZO-Benzodiazepines Screen,Ur:  Negative (03/11/2019),     Cocaine(Metab.)Screen, Ur:  Negative (03/11/2019),     MDMA-Ecstasy:  Negative (03/11/2019),     Met-Methamphetamine:  Negative (03/11/2019),     MTD-Methadone Screen, Urine:  Negative (03/11/2019),     Opiate Screen, Urine:  Negative (03/11/2019),     OXY-Oxycodone:  Negative (03/11/2019),     PCP-Phencyclidine  Screen, Uri:  Negative (03/11/2019),     THC Cannabinoids Screen, Urin:  Negative (03/11/2019),     Urine temperature:  confirmed (03/11/2019),     Date and time of last pill:  adderall 03/11/2019 @ 0600/AS (03/11/2019),     Performed by:  NICOLA (03/11/2019),     Collection Time:  1020 (03/11/2019),             ASSESSMENT           314.00   F90.0  ADD              DDx:     V58.69   Z79.899  Use of high risk medications              DDx:     296.20   F34.9  Depression              DDx:     401.1   I10  Hypertension              DDx:         ORDERS:         Meds Prescribed:       Refill of: Adderall XR (Amphetamine/Dextroamphetamine mixed salts) 30mg Capsules, Extended Release 1 tab daily  #30 (Thirty) capsule(s) Refills: 0         Radiology/Test Orders:       3017F  Colorectal CA screen results documented and reviewed (PV)  (In-House)           Lab Orders:       06569  Drug test prsmv read direct optical obs pr date  (In-House)         APPTO  Appointment need  (In-House)                   PLAN:          ADD He is stable on Adderall.  Sx well controlled.  No adverse effects.  He does require ongoing use of this controlled substance to function.  Tox screen and Christopher run today.  RTC 3 months.     MIPS     COLORECTAL CANCER SCREENING: Results are in chart     FOLLOW-UP: Schedule a follow-up visit in 3 months..  f/u ADD with [place]           Prescriptions:       Refill of: Adderall XR (Amphetamine/Dextroamphetamine mixed salts) 30mg Capsules, Extended Release 1 tab daily  #30 (Thirty) capsule(s) Refills: 0           Orders:       APPTO  Appointment need  (In-House)         3017F  Colorectal CA screen results documented and reviewed (PV)  (In-House)             Patient Education Handouts:       [facility] [place]           Use of high risk medications     Controlled substance documentation: Christopher reviewed; drug screen performed and appropriate; consent is reviewed and signed and on the chart.  He is aware of risk  of addiction on this medication, understands that he will need to follow up for a review every 3 months and his medications will be adjusted or decreased as deemed appropriate at each visit.  No history of drug or alcohol abuse.  No concerns about diversion or abuse. He denies side effects related to the medication.  He is aware that he may be called in for pill counts.  The dosing of this medication will be reviewed on a regular basis and reduced if possible..  Ongoing use of a controlled substance is necessary for this patient to have a normal quality of life           Orders:       46912  Drug test prsmv read direct optical obs pr date  (In-House)            Depression Stable.  Mood has been good.  No changes to meds.  No refills needed today.          Hypertension BP up a little bit today initially.  Will have him monitor at home before we decide whether to make any changes.  No refills needed on metoprolol succinate today.              Patient Recommendations:        For  ADD:     Schedule a follow-up visit in 3 months.                APPOINTMENT INFORMATION:        Monday Tuesday Wednesday Thursday Friday Saturday Sunday            Time:___________________AM  PM   Date:_____________________             CHARGE CAPTURE           **Please note: ICD descriptions below are intended for billing purposes only and may not represent clinical diagnoses**        Primary Diagnosis:         314.00 ADD            F90.0    Attention-deficit hyperactivity disorder, predominantly inattentive type              Orders:          75522   Office/outpatient visit; established patient, level 4  (In-House)             APPTO   Appointment need  (In-House)             3017F   Colorectal CA screen results documented and reviewed (PV)  (In-House)           V58.69 Use of high risk medications            Z79.899    Other long term (current) drug therapy              Orders:          22707   Drug test prsmv read direct optical obs pr  date  (In-House)           296.20 Depression            F34.9    Persistent mood [affective] disorder, unspecified    401.1 Hypertension            I10    Essential (primary) hypertension

## 2021-06-09 ENCOUNTER — OFFICE VISIT (OUTPATIENT)
Dept: FAMILY MEDICINE CLINIC | Age: 66
End: 2021-06-09

## 2021-06-09 ENCOUNTER — LAB (OUTPATIENT)
Dept: LAB | Facility: HOSPITAL | Age: 66
End: 2021-06-09

## 2021-06-09 VITALS
DIASTOLIC BLOOD PRESSURE: 72 MMHG | TEMPERATURE: 98.6 F | WEIGHT: 209 LBS | HEART RATE: 108 BPM | HEIGHT: 66 IN | OXYGEN SATURATION: 98 % | BODY MASS INDEX: 33.59 KG/M2 | SYSTOLIC BLOOD PRESSURE: 109 MMHG

## 2021-06-09 DIAGNOSIS — J06.9 VIRAL UPPER RESPIRATORY TRACT INFECTION: ICD-10-CM

## 2021-06-09 DIAGNOSIS — F33.0 MILD EPISODE OF RECURRENT MAJOR DEPRESSIVE DISORDER (HCC): Chronic | ICD-10-CM

## 2021-06-09 DIAGNOSIS — F98.8 ATTENTION DEFICIT DISORDER (ADD) WITHOUT HYPERACTIVITY: Chronic | ICD-10-CM

## 2021-06-09 DIAGNOSIS — Z79.899 HIGH RISK MEDICATION USE: Chronic | ICD-10-CM

## 2021-06-09 DIAGNOSIS — G89.29 CHRONIC PAIN OF LEFT KNEE: Primary | ICD-10-CM

## 2021-06-09 DIAGNOSIS — I10 PRIMARY HYPERTENSION: ICD-10-CM

## 2021-06-09 DIAGNOSIS — I10 PRIMARY HYPERTENSION: Primary | ICD-10-CM

## 2021-06-09 DIAGNOSIS — M25.562 CHRONIC PAIN OF LEFT KNEE: Primary | ICD-10-CM

## 2021-06-09 PROBLEM — F33.9 RECURRENT MAJOR DEPRESSIVE DISORDER: Chronic | Status: ACTIVE | Noted: 2021-06-09

## 2021-06-09 PROBLEM — M15.9 GENERALIZED OSTEOARTHRITIS: Chronic | Status: ACTIVE | Noted: 2021-06-09

## 2021-06-09 LAB
ALBUMIN SERPL-MCNC: 4.1 G/DL (ref 3.5–5.2)
ALBUMIN/GLOB SERPL: 1.2 G/DL
ALP SERPL-CCNC: 254 U/L (ref 39–117)
ALT SERPL W P-5'-P-CCNC: 15 U/L (ref 1–41)
AMPHET+METHAMPHET UR QL: POSITIVE
AMPHETAMINES UR QL: NEGATIVE
ANION GAP SERPL CALCULATED.3IONS-SCNC: 11 MMOL/L (ref 5–15)
AST SERPL-CCNC: 19 U/L (ref 1–40)
BARBITURATES UR QL SCN: NEGATIVE
BENZODIAZ UR QL SCN: NEGATIVE
BILIRUB SERPL-MCNC: 0.3 MG/DL (ref 0–1.2)
BUN SERPL-MCNC: 15 MG/DL (ref 8–23)
BUN/CREAT SERPL: 14 (ref 7–25)
BUPRENORPHINE SERPL-MCNC: NEGATIVE NG/ML
CALCIUM SPEC-SCNC: 9 MG/DL (ref 8.6–10.5)
CANNABINOIDS SERPL QL: NEGATIVE
CHLORIDE SERPL-SCNC: 99 MMOL/L (ref 98–107)
CHOLEST SERPL-MCNC: 167 MG/DL (ref 0–200)
CO2 SERPL-SCNC: 26 MMOL/L (ref 22–29)
COCAINE UR QL: NEGATIVE
CREAT SERPL-MCNC: 1.07 MG/DL (ref 0.76–1.27)
EXPIRATION DATE: ABNORMAL
GFR SERPL CREATININE-BSD FRML MDRD: 70 ML/MIN/1.73
GLOBULIN UR ELPH-MCNC: 3.3 GM/DL
GLUCOSE SERPL-MCNC: 102 MG/DL (ref 65–99)
HDLC SERPL-MCNC: 34 MG/DL (ref 40–60)
HOLD SPECIMEN: NORMAL
LDLC SERPL CALC-MCNC: 117 MG/DL (ref 0–100)
LDLC/HDLC SERPL: 3.42 {RATIO}
Lab: ABNORMAL
MDMA UR QL SCN: NEGATIVE
METHADONE UR QL SCN: NEGATIVE
OPIATES UR QL: NEGATIVE
OXYCODONE UR QL SCN: NEGATIVE
PCP UR QL SCN: NEGATIVE
POTASSIUM SERPL-SCNC: 4.6 MMOL/L (ref 3.5–5.2)
PROT SERPL-MCNC: 7.4 G/DL (ref 6–8.5)
SODIUM SERPL-SCNC: 136 MMOL/L (ref 136–145)
TRIGL SERPL-MCNC: 83 MG/DL (ref 0–150)
VLDLC SERPL-MCNC: 16 MG/DL (ref 5–40)

## 2021-06-09 PROCEDURE — 80061 LIPID PANEL: CPT

## 2021-06-09 PROCEDURE — 99214 OFFICE O/P EST MOD 30 MIN: CPT | Performed by: FAMILY MEDICINE

## 2021-06-09 PROCEDURE — 80053 COMPREHEN METABOLIC PANEL: CPT

## 2021-06-09 PROCEDURE — 36415 COLL VENOUS BLD VENIPUNCTURE: CPT

## 2021-06-09 PROCEDURE — 80305 DRUG TEST PRSMV DIR OPT OBS: CPT | Performed by: FAMILY MEDICINE

## 2021-06-09 RX ORDER — AMOXICILLIN 875 MG/1
875 TABLET, COATED ORAL EVERY 12 HOURS SCHEDULED
Qty: 14 TABLET | Refills: 0 | Status: SHIPPED | OUTPATIENT
Start: 2021-06-09 | End: 2021-06-16

## 2021-06-09 RX ORDER — METOPROLOL SUCCINATE 25 MG/1
25 TABLET, EXTENDED RELEASE ORAL DAILY
COMMUNITY
Start: 2021-04-12 | End: 2021-07-21

## 2021-06-09 RX ORDER — DEXTROAMPHETAMINE SULFATE, DEXTROAMPHETAMINE SACCHARATE, AMPHETAMINE SULFATE AND AMPHETAMINE ASPARTATE 7.5; 7.5; 7.5; 7.5 MG/1; MG/1; MG/1; MG/1
30 CAPSULE, EXTENDED RELEASE ORAL EVERY MORNING
COMMUNITY
Start: 2021-05-29 | End: 2021-07-01 | Stop reason: SDUPTHER

## 2021-06-09 RX ORDER — FLUOXETINE HYDROCHLORIDE 40 MG/1
40 CAPSULE ORAL DAILY
COMMUNITY
Start: 2021-03-15 | End: 2021-06-22

## 2021-06-09 NOTE — ASSESSMENT & PLAN NOTE
Treating with amoxicillin due to sx duration.  Supportive care as indicated.  Call or RTC prn worsening or failure to improve of sx.

## 2021-06-09 NOTE — ASSESSMENT & PLAN NOTE
Stable on current regimen.  Symptoms are well controlled.  No adverse effects. He does require ongoing use of this controlled substance to function.  Tox screen was due today.  Prior tox screen appropriate.  Christopher was run today.  Refills were not needed today.  RTC 3 months.

## 2021-06-09 NOTE — PROGRESS NOTES
Chief Complaint  Leg Pain    Subjective          Corey Velasquez presents to [facility] today for routine f/u of chronic issues.    He [place] having trouble with his L leg.  Over the past year, he has had increasing pain in the L knee.  He has seen Dr. Durand for this and has had several surgeries on the L knee.  He had a bone scan in Oct 2020 that initially suggested hardware loosening, but on overread, Dr. Durand decided he didn't think this was the problem.  He was therefore sent to PT, and that helped temporarily but the pain always came back.  He has locking of the knee.  He has had unilateral swelling of the left leg acutely last month, so he went to the ED.  Evaluation for DVT was negative.  He did have U/S at that time, and he did not have a Baker's cyst at that time.  However, since he was seen there, he has developed a large amount of swelling in the popliteal area.  He has been on meloxicam in the past but stopped because he didn't think it helped.  Ibuprofen 400 mg does help, but it is tearing up his stomach.    He has had productive cough with chest congestion for the past week.  No fevers or chills.  No chest pain or SOB.      He is on Adderall XR 30 mg daily for ADD.  He has been stable on this regimen for many years now, originally started by Dr. Wheat. Sx of concentration and focus have been well controlled.  Denies adverse effects.  His original diagnose came from Dr. Zeng, psychiatrist.  He does not take drug holidays.    He is on metoprolol succinate for HTN.  BP has been well controlled.  Denies CP, SOB, or palpitations.  Follows with Cardiology.    He is on fluoxetine for depression.  Sx have been well controlled.  He denies depressed mood or anhedonia.  No anxiety or panic attacks.       Current Outpatient Medications:   •  Adderall XR 30 MG 24 hr capsule, Take 30 mg by mouth Every Morning  , Disp: , Rfl:   •  FLUoxetine (PROzac) 40 MG capsule, Take 40 mg by  "mouth Daily., Disp: , Rfl:   •  metoprolol succinate XL (TOPROL-XL) 25 MG 24 hr tablet, Take 25 mg by mouth Daily., Disp: , Rfl:   •  amoxicillin (AMOXIL) 875 MG tablet, Take 1 tablet by mouth Every 12 (Twelve) Hours for 7 days., Disp: 14 tablet, Rfl: 0    Allergies:  Patient has no known allergies.    Health Maintenance Due   Topic Date Due   • ANNUAL PHYSICAL  Never done   • ZOSTER VACCINE (2 of 2) 11/29/2018       Immunization History   Administered Date(s) Administered   • COVID-19 (PFIZER) 03/05/2021, 03/25/2021   • Flu Vaccine Intradermal Quad 18-64YR 10/16/2017, 09/23/2020   • Hepatitis A 09/04/2018   • Influenza Quad Vaccine (Inpatient) 10/01/2016   • Influenza, Unspecified 10/01/2020   • Shingrix 10/04/2018   • Tdap 10/01/2019         Objective   Vital Signs:   /72 (BP Location: Left arm, Patient Position: Sitting)   Pulse 108   Temp 98.6 °F (37 °C) (Oral)   Ht 167.6 cm (65.98\")   Wt 94.8 kg (209 lb)   SpO2 98%   BMI 33.75 kg/m²     Physical Exam  Vitals reviewed.   Constitutional:       General: He is not in acute distress.     Appearance: Normal appearance. He is well-developed.   HENT:      Head: Normocephalic and atraumatic.      Right Ear: External ear normal.      Left Ear: External ear normal.      Nose: Nose normal.      Mouth/Throat:      Mouth: Mucous membranes are moist.   Eyes:      Extraocular Movements: Extraocular movements intact.      Conjunctiva/sclera: Conjunctivae normal.      Pupils: Pupils are equal, round, and reactive to light.   Cardiovascular:      Rate and Rhythm: Normal rate and regular rhythm.      Heart sounds: No murmur heard.     Pulmonary:      Effort: Pulmonary effort is normal.      Breath sounds: Normal breath sounds. No wheezing, rhonchi or rales.   Abdominal:      General: Bowel sounds are normal. There is no distension.      Palpations: Abdomen is soft.      Tenderness: There is no abdominal tenderness.   Musculoskeletal:         General: Tenderness " present. Normal range of motion.      Right knee: Normal.      Left knee: Swelling (popliteal) present. Tenderness (diffuse) present.      Comments: Pain with flexion and extension of the L knee against resistance.  Well healed anterior scars from previous surgeries.   Neurological:      Mental Status: He is alert.      Deep Tendon Reflexes: Reflexes normal.   Psychiatric:         Mood and Affect: Mood and affect normal.         Behavior: Behavior normal.         Thought Content: Thought content normal.         Judgment: Judgment normal.             Assessment and Plan    Diagnoses and all orders for this visit:    1. Chronic pain of left knee (Primary)  Assessment & Plan:  Herrera is having worsening of his chronic L knee pain, to the point where it is now impacting his function.  He has had two surgeries on the knee in the past, one by Dr. Santoyo and one by Dr. Durand.  He had bone scan done back in October by Dr. Durand (scanned into the record) that was initially read as showing hardware loosening.  However, over-read by Dr. Durand and the radiologist reportedly back-tracked that initial assessment and Herrera was sent to PT instead.  PT gave him only temporary relief.  Ibuprofen does give him some relief, but now he is starting to have GI upset with the NSAID use.  Will get him in with Dr. Banks for a second opinion and to see if there is anything else that can be done for him.  He does have popliteal swelling, of note, but U/S was done in the ED 2 weeks ago in the course of a work-up for DVT, and that was negative for Baker's cyst.    Orders:  -     Ambulatory Referral to Orthopedic Surgery    2. Attention deficit disorder (ADD) without hyperactivity  Assessment & Plan:  Stable on current regimen.  Symptoms are well controlled.  No adverse effects. He does require ongoing use of this controlled substance to function.  Tox screen was due today.  Prior tox screen appropriate.  Christopher was run today.  Refills were  not needed today.  RTC 3 months.        3. High risk medication use  -     POC Urine Drug Screen [facility]    4. Primary hypertension  Assessment & Plan:  Stable on metoprolol succinate 25 mg daily.  No changes to meds.  BP at goal.  Checking labs.    Orders:  -     Comprehensive Metabolic Panel; Future  -     Lipid Panel; Future    5. Mild episode of recurrent major depressive disorder (CMS/HCC)  Assessment & Plan:  Stable on fluoxetine 40 mg daily.  No refills needed.      6. Viral upper respiratory tract infection  Assessment & Plan:  Treating with amoxicillin due to sx duration.  Supportive care as indicated.  Call or RTC prn worsening or failure to improve of sx.    Orders:  -     amoxicillin (AMOXIL) 875 MG tablet; Take 1 tablet by mouth Every 12 (Twelve) Hours for 7 days.  Dispense: 14 tablet; Refill: 0    Other orders  -     Cancel: Ambulatory Referral to Orthopedic Surgery      Follow Up   Return in about 3 months (around 9/9/2021) for Annual physical.  Patient was given instructions and counseling regarding his condition or for health maintenance advice. Please see specific information pulled into the AVS if appropriate.

## 2021-06-09 NOTE — ASSESSMENT & PLAN NOTE
Herrera is having worsening of his chronic L knee pain, to the point where it is now impacting his function.  He has had two surgeries on the knee in the past, one by Dr. Santoyo and one by Dr. Durand.  He had bone scan done back in October by Dr. Durand (scanned into the record) that was initially read as showing hardware loosening.  However, over-read by Dr. Durand and the radiologist reportedly back-tracked that initial assessment and Herrera was sent to PT instead.  PT gave him only temporary relief.  Ibuprofen does give him some relief, but now he is starting to have GI upset with the NSAID use.  Will get him in with Dr. Banks for a second opinion and to see if there is anything else that can be done for him.  He does have popliteal swelling, of note, but U/S was done in the ED 2 weeks ago in the course of a work-up for DVT, and that was negative for Baker's cyst.

## 2021-06-16 ENCOUNTER — TRANSCRIBE ORDERS (OUTPATIENT)
Dept: ADMINISTRATIVE | Facility: HOSPITAL | Age: 66
End: 2021-06-16

## 2021-06-16 DIAGNOSIS — M79.662 PAIN OF LEFT LOWER LEG: Primary | ICD-10-CM

## 2021-06-22 RX ORDER — FLUOXETINE HYDROCHLORIDE 40 MG/1
CAPSULE ORAL
Qty: 90 CAPSULE | Refills: 1 | Status: SHIPPED | OUTPATIENT
Start: 2021-06-22 | End: 2021-12-30 | Stop reason: SDUPTHER

## 2021-07-01 ENCOUNTER — HOSPITAL ENCOUNTER (OUTPATIENT)
Dept: NUCLEAR MEDICINE | Facility: HOSPITAL | Age: 66
Discharge: HOME | End: 2021-07-01

## 2021-07-01 ENCOUNTER — TELEPHONE (OUTPATIENT)
Dept: FAMILY MEDICINE CLINIC | Age: 66
End: 2021-07-01

## 2021-07-01 VITALS
DIASTOLIC BLOOD PRESSURE: 79 MMHG | HEIGHT: 66 IN | HEART RATE: 91 BPM | TEMPERATURE: 97.6 F | BODY MASS INDEX: 33.82 KG/M2 | WEIGHT: 210.4 LBS | SYSTOLIC BLOOD PRESSURE: 118 MMHG

## 2021-07-01 VITALS
BODY MASS INDEX: 34.68 KG/M2 | WEIGHT: 215.8 LBS | HEIGHT: 66 IN | SYSTOLIC BLOOD PRESSURE: 133 MMHG | HEART RATE: 94 BPM | DIASTOLIC BLOOD PRESSURE: 85 MMHG | TEMPERATURE: 98 F

## 2021-07-01 VITALS
SYSTOLIC BLOOD PRESSURE: 130 MMHG | HEART RATE: 83 BPM | BODY MASS INDEX: 33.94 KG/M2 | TEMPERATURE: 98.7 F | HEIGHT: 66 IN | DIASTOLIC BLOOD PRESSURE: 85 MMHG | WEIGHT: 211.2 LBS

## 2021-07-01 VITALS
TEMPERATURE: 98.3 F | DIASTOLIC BLOOD PRESSURE: 94 MMHG | WEIGHT: 209.2 LBS | HEIGHT: 66 IN | HEART RATE: 88 BPM | BODY MASS INDEX: 33.62 KG/M2 | SYSTOLIC BLOOD PRESSURE: 108 MMHG

## 2021-07-01 VITALS
HEART RATE: 105 BPM | WEIGHT: 209.8 LBS | BODY MASS INDEX: 33.72 KG/M2 | SYSTOLIC BLOOD PRESSURE: 130 MMHG | HEIGHT: 66 IN | TEMPERATURE: 99.1 F | DIASTOLIC BLOOD PRESSURE: 81 MMHG

## 2021-07-01 VITALS
HEIGHT: 66 IN | WEIGHT: 216.4 LBS | TEMPERATURE: 98.7 F | HEART RATE: 104 BPM | SYSTOLIC BLOOD PRESSURE: 133 MMHG | BODY MASS INDEX: 34.78 KG/M2 | DIASTOLIC BLOOD PRESSURE: 94 MMHG

## 2021-07-01 VITALS
DIASTOLIC BLOOD PRESSURE: 79 MMHG | OXYGEN SATURATION: 99 % | TEMPERATURE: 98.6 F | HEART RATE: 84 BPM | BODY MASS INDEX: 34.72 KG/M2 | WEIGHT: 216 LBS | HEIGHT: 66 IN | SYSTOLIC BLOOD PRESSURE: 123 MMHG

## 2021-07-01 DIAGNOSIS — M79.662 PAIN OF LEFT LOWER LEG: ICD-10-CM

## 2021-07-01 DIAGNOSIS — F98.8 ATTENTION DEFICIT DISORDER (ADD) WITHOUT HYPERACTIVITY: Primary | ICD-10-CM

## 2021-07-01 PROCEDURE — A9503 TC99M MEDRONATE: HCPCS | Performed by: ORTHOPAEDIC SURGERY

## 2021-07-01 PROCEDURE — 0 TECHNETIUM MEDRONATE KIT: Performed by: ORTHOPAEDIC SURGERY

## 2021-07-01 PROCEDURE — 78315 BONE IMAGING 3 PHASE: CPT

## 2021-07-01 RX ORDER — DEXTROAMPHETAMINE SULFATE, DEXTROAMPHETAMINE SACCHARATE, AMPHETAMINE SULFATE AND AMPHETAMINE ASPARTATE 7.5; 7.5; 7.5; 7.5 MG/1; MG/1; MG/1; MG/1
30 CAPSULE, EXTENDED RELEASE ORAL EVERY MORNING
Qty: 30 CAPSULE | Refills: 0 | Status: SHIPPED | OUTPATIENT
Start: 2021-07-01 | End: 2021-07-29 | Stop reason: SDUPTHER

## 2021-07-01 RX ORDER — TC 99M MEDRONATE 20 MG/10ML
23.8 INJECTION, POWDER, LYOPHILIZED, FOR SOLUTION INTRAVENOUS
Status: COMPLETED | OUTPATIENT
Start: 2021-07-01 | End: 2021-07-01

## 2021-07-01 RX ADMIN — Medication 23.8 MILLICURIE: at 08:45

## 2021-07-01 NOTE — TELEPHONE ENCOUNTER
Hub staff attempted to follow warm transfer process and was unsuccessful     Caller: Corey Velasquez    Relationship to patient: Self    Best call back number: [phone]    Patient is needing: PATIENT WOULD LIKE TO SPEAK WITH DR CROWDER'S NURSE ASAP AND IS EXPECTING A CALL TODAY

## 2021-07-01 NOTE — TELEPHONE ENCOUNTER
Caller: Corey Velasquez    Relationship to patient: Self    Best call back number: [phone]    Patient is needing: PATIENT CALLED IN AND REQUESTED A CALL BACK FROM [facility] IF POSSIBLE. PATIENT SAID IT WAS REGARDING A MEDICATION REFILL.

## 2021-07-02 VITALS
DIASTOLIC BLOOD PRESSURE: 81 MMHG | TEMPERATURE: 98.3 F | BODY MASS INDEX: 35.93 KG/M2 | HEART RATE: 91 BPM | WEIGHT: 223.6 LBS | HEIGHT: 66 IN | SYSTOLIC BLOOD PRESSURE: 140 MMHG

## 2021-07-02 VITALS
DIASTOLIC BLOOD PRESSURE: 87 MMHG | TEMPERATURE: 96.3 F | BODY MASS INDEX: 35.65 KG/M2 | HEART RATE: 96 BPM | HEIGHT: 66 IN | WEIGHT: 221.8 LBS | SYSTOLIC BLOOD PRESSURE: 147 MMHG

## 2021-07-02 VITALS
HEIGHT: 66 IN | WEIGHT: 221.21 LBS | SYSTOLIC BLOOD PRESSURE: 132 MMHG | BODY MASS INDEX: 35.55 KG/M2 | DIASTOLIC BLOOD PRESSURE: 92 MMHG | HEART RATE: 93 BPM

## 2021-07-21 ENCOUNTER — OFFICE VISIT (OUTPATIENT)
Dept: FAMILY MEDICINE CLINIC | Age: 66
End: 2021-07-21

## 2021-07-21 VITALS
BODY MASS INDEX: 34.27 KG/M2 | HEIGHT: 66 IN | TEMPERATURE: 98.4 F | SYSTOLIC BLOOD PRESSURE: 130 MMHG | WEIGHT: 213.2 LBS | DIASTOLIC BLOOD PRESSURE: 83 MMHG | HEART RATE: 77 BPM

## 2021-07-21 DIAGNOSIS — F33.0 MILD EPISODE OF RECURRENT MAJOR DEPRESSIVE DISORDER (HCC): ICD-10-CM

## 2021-07-21 DIAGNOSIS — F98.8 ATTENTION DEFICIT DISORDER (ADD) WITHOUT HYPERACTIVITY: Primary | Chronic | ICD-10-CM

## 2021-07-21 DIAGNOSIS — M15.9 GENERALIZED OSTEOARTHRITIS: Chronic | ICD-10-CM

## 2021-07-21 DIAGNOSIS — I10 HYPERTENSION, ESSENTIAL: ICD-10-CM

## 2021-07-21 DIAGNOSIS — Z79.899 HIGH RISK MEDICATION USE: Chronic | ICD-10-CM

## 2021-07-21 PROCEDURE — 99214 OFFICE O/P EST MOD 30 MIN: CPT | Performed by: FAMILY MEDICINE

## 2021-07-21 RX ORDER — METOPROLOL SUCCINATE 25 MG/1
TABLET, EXTENDED RELEASE ORAL
Qty: 90 TABLET | Refills: 2 | Status: SHIPPED | OUTPATIENT
Start: 2021-07-21 | End: 2022-03-31

## 2021-07-21 NOTE — ASSESSMENT & PLAN NOTE
Stable on current regimen.  Symptoms are well controlled.  No adverse effects. He does require ongoing use of this controlled substance to function.  Tox screen was not due today.  Prior tox screen appropriate.  Christopher was run today.  Refills were not needed today.  RTC as scheduled in Sept.

## 2021-07-21 NOTE — PROGRESS NOTES
"Chief Complaint  Ortho (discuss ortho referral - states he saw Dr. Ceballos 07/20/2021)    Subjective          Corey Velasquez presents to [facility] today for routine f/u of chronic issues.    He also needs to discuss a referral to Ortho Dr. Rothman.  He wants to do a revision of the L TKA.  It's just the top part of the prosthesis that has come loose.  He is going to be doing that in the near future at [facility].  He jade a bunch of fluid of a knee effusion and there was no sign of infection.    He is on Adderall XR 30 mg daily for ADD.  He has been stable on this regimen for years. Concentration and focus have been well controlled.  Denies adverse effects.  He was originally diagnosed by Dr. Zeng many years ago.  He does not take drug holidays.      He is on metoprolol succinate for HTN.  BP has been well controlled.  Denies CP, SOB, palpitations.  He is following with [facility].      He is on fluoxetine for MDD.  Sx have been well controlled.  Denies depression or anhedonia, anxiety or panic attacks.       He is on meloxicam for generalized aches and pains from diffuse OA.      Current Outpatient Medications:   •  Adderall XR 30 MG 24 hr capsule, Take 1 capsule by mouth Every Morning, Disp: 30 capsule, Rfl: 0  •  FLUoxetine (PROzac) 40 MG capsule, TAKE ONE CAPSULE BY MOUTH DAILY, Disp: 90 capsule, Rfl: 1  •  metoprolol succinate XL (TOPROL-XL) 25 MG 24 hr tablet, TAKE ONE TABLET BY MOUTH DAILY, Disp: 90 tablet, Rfl: 2    Allergies:  Patient has no known allergies.      Objective   Vital Signs:   /83   Pulse 77   Temp 98.4 °F (36.9 °C) (Oral)   Ht 167.6 cm (66\")   Wt 96.7 kg (213 lb 3.2 oz)   BMI 34.41 kg/m²     Physical Exam  Vitals reviewed.   Constitutional:       General: He is not in acute distress.     Appearance: Normal appearance. He is well-developed.   HENT:      Head: Normocephalic and atraumatic.      Right Ear: External ear normal.      Left Ear: External ear " normal.      Nose: Nose normal.      Mouth/Throat:      Mouth: Mucous membranes are moist.   Eyes:      Extraocular Movements: Extraocular movements intact.      Conjunctiva/sclera: Conjunctivae normal.      Pupils: Pupils are equal, round, and reactive to light.   Cardiovascular:      Rate and Rhythm: Normal rate and regular rhythm.      Heart sounds: No murmur heard.     Pulmonary:      Effort: Pulmonary effort is normal.      Breath sounds: Normal breath sounds. No wheezing, rhonchi or rales.   Abdominal:      General: Bowel sounds are normal. There is no distension.      Palpations: Abdomen is soft.      Tenderness: There is no abdominal tenderness.   Musculoskeletal:         General: Normal range of motion.   Neurological:      Mental Status: He is alert.   Psychiatric:         Mood and Affect: Affect normal.             Assessment and Plan    Diagnoses and all orders for this visit:    1. Attention deficit disorder (ADD) without hyperactivity (Primary)  Assessment & Plan:  Stable on current regimen.  Symptoms are well controlled.  No adverse effects. He does require ongoing use of this controlled substance to function.  Tox screen was not due today.  Prior tox screen appropriate.  Christopher was run today.  Refills were not needed today.  RTC as scheduled in Sept.        2. High risk medication use  -     Cancel: POC Urine Drug Screen [facility]    3. Mild episode of recurrent major depressive disorder (CMS/HCC)  Assessment & Plan:  Stable.  No refills needed.  Continue fluoxetine 40 mg daily.      4. Hypertension, essential  Assessment & Plan:  His metoprolol 25 mg is doing well for control of his blood pressure.  No changes to medications at this time.  His blood pressure was slightly elevated on arrival but improved on recheck.  He will continue to monitor this at home and bring these values to his next appointment.      5. Generalized osteoarthritis  Assessment & Plan:  Following up with   Lorna.        Follow Up   Return for as scheduled.  Patient was given instructions and counseling regarding his condition or for health maintenance advice. Please see specific information pulled into the AVS if appropriate.

## 2021-07-21 NOTE — ASSESSMENT & PLAN NOTE
His metoprolol 25 mg is doing well for control of his blood pressure.  No changes to medications at this time.  His blood pressure was slightly elevated on arrival but improved on recheck.  He will continue to monitor this at home and bring these values to his next appointment.

## 2021-07-29 DIAGNOSIS — F98.8 ATTENTION DEFICIT DISORDER (ADD) WITHOUT HYPERACTIVITY: ICD-10-CM

## 2021-07-29 RX ORDER — DEXTROAMPHETAMINE SULFATE, DEXTROAMPHETAMINE SACCHARATE, AMPHETAMINE SULFATE AND AMPHETAMINE ASPARTATE 7.5; 7.5; 7.5; 7.5 MG/1; MG/1; MG/1; MG/1
30 CAPSULE, EXTENDED RELEASE ORAL EVERY MORNING
Qty: 30 CAPSULE | Refills: 0 | Status: SHIPPED | OUTPATIENT
Start: 2021-07-29 | End: 2021-09-01 | Stop reason: SDUPTHER

## 2021-09-01 ENCOUNTER — TELEPHONE (OUTPATIENT)
Dept: FAMILY MEDICINE CLINIC | Age: 66
End: 2021-09-01

## 2021-09-01 DIAGNOSIS — F98.8 ATTENTION DEFICIT DISORDER (ADD) WITHOUT HYPERACTIVITY: ICD-10-CM

## 2021-09-01 RX ORDER — DEXTROAMPHETAMINE SULFATE, DEXTROAMPHETAMINE SACCHARATE, AMPHETAMINE SULFATE AND AMPHETAMINE ASPARTATE 7.5; 7.5; 7.5; 7.5 MG/1; MG/1; MG/1; MG/1
30 CAPSULE, EXTENDED RELEASE ORAL EVERY MORNING
Qty: 30 CAPSULE | Refills: 0 | Status: SHIPPED | OUTPATIENT
Start: 2021-09-01 | End: 2021-09-30 | Stop reason: SDUPTHER

## 2021-09-01 NOTE — TELEPHONE ENCOUNTER
Caller: Corey Velasquez    Relationship: Self    Best call back number: [phone]     Medication needed:   Requested Prescriptions     Pending Prescriptions Disp Refills   • Adderall XR 30 MG 24 hr capsule 30 capsule 0     Sig: Take 1 capsule by mouth Every Morning       When do you need the refill by: 09/01/21     What additional details did the patient provide when requesting the medication: PATIENT IS NEEDING A REFILL. PLEASE CALL AND ADVISE.     Does the patient have less than a 3 day supply:  [x] Yes  [] No    What is the patient's preferred pharmacy: [facility] [address] [place] [address] - [facility] [address]  - [phone]  - [phone] FX

## 2021-09-07 ENCOUNTER — APPOINTMENT (OUTPATIENT)
Dept: PREADMISSION TESTING | Facility: HOSPITAL | Age: 66
End: 2021-09-07

## 2021-09-22 ENCOUNTER — OFFICE VISIT (OUTPATIENT)
Dept: FAMILY MEDICINE CLINIC | Age: 66
End: 2021-09-22

## 2021-09-22 VITALS
HEIGHT: 66 IN | TEMPERATURE: 98.8 F | BODY MASS INDEX: 33.37 KG/M2 | SYSTOLIC BLOOD PRESSURE: 133 MMHG | HEART RATE: 90 BPM | WEIGHT: 207.6 LBS | DIASTOLIC BLOOD PRESSURE: 81 MMHG

## 2021-09-22 DIAGNOSIS — F98.8 ATTENTION DEFICIT DISORDER (ADD) WITHOUT HYPERACTIVITY: Chronic | ICD-10-CM

## 2021-09-22 DIAGNOSIS — F33.0 MILD EPISODE OF RECURRENT MAJOR DEPRESSIVE DISORDER (HCC): ICD-10-CM

## 2021-09-22 DIAGNOSIS — I10 HYPERTENSION, ESSENTIAL: ICD-10-CM

## 2021-09-22 DIAGNOSIS — Z00.00 ANNUAL PHYSICAL EXAM: Primary | ICD-10-CM

## 2021-09-22 DIAGNOSIS — Z79.899 ENCOUNTER FOR LONG-TERM (CURRENT) USE OF MEDICATIONS: ICD-10-CM

## 2021-09-22 LAB
AMPHET+METHAMPHET UR QL: POSITIVE
AMPHETAMINE INTERNAL CONTROL: ABNORMAL
AMPHETAMINES UR QL: NEGATIVE
BARBITURATE INTERNAL CONTROL: ABNORMAL
BARBITURATES UR QL SCN: NEGATIVE
BENZODIAZ UR QL SCN: NEGATIVE
BENZODIAZEPINE INTERNAL CONTROL: ABNORMAL
BUPRENORPHINE INTERNAL CONTROL: ABNORMAL
BUPRENORPHINE SERPL-MCNC: NEGATIVE NG/ML
CANNABINOIDS SERPL QL: NEGATIVE
COCAINE INTERNAL CONTROL: ABNORMAL
COCAINE UR QL: NEGATIVE
EXPIRATION DATE: ABNORMAL
Lab: ABNORMAL
MDMA (ECSTASY) INTERNAL CONTROL: ABNORMAL
MDMA UR QL SCN: NEGATIVE
METHADONE INTERNAL CONTROL: ABNORMAL
METHADONE UR QL SCN: NEGATIVE
METHAMPHETAMINE INTERNAL CONTROL: ABNORMAL
OPIATES INTERNAL CONTROL: ABNORMAL
OPIATES UR QL: NEGATIVE
OXYCODONE INTERNAL CONTROL: ABNORMAL
OXYCODONE UR QL SCN: NEGATIVE
PCP UR QL SCN: NEGATIVE
PHENCYCLIDINE INTERNAL CONTROL: ABNORMAL
THC INTERNAL CONTROL: ABNORMAL

## 2021-09-22 PROCEDURE — 99213 OFFICE O/P EST LOW 20 MIN: CPT | Performed by: FAMILY MEDICINE

## 2021-09-22 PROCEDURE — 80305 DRUG TEST PRSMV DIR OPT OBS: CPT | Performed by: FAMILY MEDICINE

## 2021-09-22 PROCEDURE — 99396 PREV VISIT EST AGE 40-64: CPT | Performed by: FAMILY MEDICINE

## 2021-09-22 NOTE — ASSESSMENT & PLAN NOTE
He is UTD on colonoscopy, last done 9/2018.  This had poor prep but was found to be normal.  Five year repeat recommended.  He is due for prostate cancer screening; will plan to get PSA with next lab draw.  He is UTD on COVID,  Shingrix (12/2018), Tdap (10/2019).  He is due for Td and flu.  We do not have flu shots today so this can be done at pharmacy or he can call back later in the week.  Td can be done at the pharmacy.  He is UTD on routine labs.

## 2021-09-22 NOTE — PROGRESS NOTES
Chief Complaint  Annual Exam    Subjective          Corey Velasquez presents to [facility] today for his annual physical.  He is UTD on colonoscopy, last done 9/2018.  This had poor prep but was found to be normal.  Five year repeat recommended.  He is due for prostate cancer screening.  He is UTD on COVID,  Shingrix (12/2018), Tdap (10/2019).  He is due for Shingrix #2, Td, and flu.  He is UTD on routine labs.      He is on Adderall XR 30 mg daily for ADD.  He has been taking this regimen for years and has been quite stable.  Sx of concentration and focus are well controlled.  He denies adverse effects.  He was originally diagnosed by Dr. Zeng many years ago.  He does not take drug holidays.     He is fluoxetine for depression. S x are well controlled.      He is on metoprolol succinate for HTN.  BP has been well controlled.  He denies CP, palpitations, SOB.      Review of Systems   Constitutional: Negative for chills, fatigue and fever.   HENT: Negative for congestion, hearing loss and rhinorrhea.    Eyes: Negative for pain and visual disturbance.   Respiratory: Negative for cough and shortness of breath.    Cardiovascular: Negative for chest pain and palpitations.   Gastrointestinal: Negative for abdominal pain, constipation, diarrhea, nausea and vomiting.   Genitourinary: Negative for dysuria and hematuria.   Musculoskeletal: Positive for arthralgias and neck pain. Negative for myalgias.   Skin: Negative for rash.   Neurological: Negative for weakness and numbness.   Psychiatric/Behavioral: Negative for dysphoric mood and sleep disturbance. The patient is not nervous/anxious.          Current Outpatient Medications:   •  Adderall XR 30 MG 24 hr capsule, Take 1 capsule by mouth Every Morning, Disp: 30 capsule, Rfl: 0  •  FLUoxetine (PROzac) 40 MG capsule, TAKE ONE CAPSULE BY MOUTH DAILY, Disp: 90 capsule, Rfl: 1  •  metoprolol succinate XL (TOPROL-XL) 25 MG 24 hr tablet, TAKE  "ONE TABLET BY MOUTH DAILY, Disp: 90 tablet, Rfl: 2    Allergies:  Patient has no known allergies.      Objective   Vital Signs:   /81 (BP Location: Right arm, Patient Position: Sitting)   Pulse 90   Temp 98.8 °F (37.1 °C) (Oral)   Ht 167.6 cm (66\")   Wt 94.2 kg (207 lb 9.6 oz)   BMI 33.51 kg/m²     Physical Exam  Vitals reviewed.   Constitutional:       General: He is not in acute distress.     Appearance: Normal appearance. He is well-developed.   HENT:      Head: Normocephalic and atraumatic.      Right Ear: External ear normal.      Left Ear: External ear normal.      Nose: Nose normal.      Mouth/Throat:      Mouth: Mucous membranes are moist.   Eyes:      Extraocular Movements: Extraocular movements intact.      Conjunctiva/sclera: Conjunctivae normal.      Pupils: Pupils are equal, round, and reactive to light.   Cardiovascular:      Rate and Rhythm: Normal rate and regular rhythm.      Heart sounds: No murmur heard.     Pulmonary:      Effort: Pulmonary effort is normal.      Breath sounds: Normal breath sounds. No wheezing, rhonchi or rales.   Abdominal:      General: Bowel sounds are normal. There is no distension.      Palpations: Abdomen is soft.      Tenderness: There is no abdominal tenderness.   Musculoskeletal:         General: Normal range of motion.   Skin:     General: Skin is warm and dry.   Neurological:      Mental Status: He is alert and oriented to person, place, and time.      Deep Tendon Reflexes: Reflexes normal.   Psychiatric:         Mood and Affect: Mood and affect normal.         Behavior: Behavior normal.         Thought Content: Thought content normal.         Judgment: Judgment normal.             Assessment and Plan    Diagnoses and all orders for this visit:    1. Annual physical exam (Primary)  Assessment & Plan:  He is UTD on colonoscopy, last done 9/2018.  This had poor prep but was found to be normal.  Five year repeat recommended.  He is due for prostate cancer " screening; will plan to get PSA with next lab draw.  He is UTD on COVID,  Shingrix (12/2018), Tdap (10/2019).  He is due for Td and flu.  We do not have flu shots today so this can be done at pharmacy or he can call back later in the week.  Td can be done at the pharmacy.  He is UTD on routine labs.      2. Attention deficit disorder (ADD) without hyperactivity  Assessment & Plan:  Stable on current regimen.  Symptoms are well controlled.  No adverse effects. He does require ongoing use of this controlled substance to function.  Tox screen was due today.  Prior tox screen appropriate.  Christopher was run today.  Refills were not needed today.  RTC 3 months.        3. Encounter for long-term (current) use of medications  -     POC Urine Drug Screen [facility]    4. Hypertension, essential  Assessment & Plan:  Stable.  No refills needed.  Labs reviewed and UTD.      5. Mild episode of recurrent major depressive disorder (CMS/HCC)  Assessment & Plan:  Stable.  No refills needed.        Follow Up   Return in about 3 months (around 12/22/2021) for Recheck.  Patient was given instructions and counseling regarding his condition or for health maintenance advice. Please see specific information pulled into the AVS if appropriate.

## 2021-09-23 ENCOUNTER — PATIENT MESSAGE (OUTPATIENT)
Dept: FAMILY MEDICINE CLINIC | Age: 66
End: 2021-09-23

## 2021-09-23 DIAGNOSIS — M54.2 CERVICALGIA: Primary | ICD-10-CM

## 2021-09-24 NOTE — TELEPHONE ENCOUNTER
From: Corey Velasquez  To: Cleopatra Higginbotham MD  Sent: 9/23/2021 10:43 AM EDT  Subject: Visit Follow-Up Question    could you put in a order through your lab for me to receive a neck xray so i could take a copy to my chiropractor on monday morning.i should of asked you yesterday but old people forget im sorry thanks RVoils

## 2021-09-27 ENCOUNTER — HOSPITAL ENCOUNTER (OUTPATIENT)
Dept: GENERAL RADIOLOGY | Facility: HOSPITAL | Age: 66
Discharge: HOME | End: 2021-09-27
Admitting: FAMILY MEDICINE

## 2021-09-27 DIAGNOSIS — M54.2 CERVICALGIA: ICD-10-CM

## 2021-09-27 PROCEDURE — 72040 X-RAY EXAM NECK SPINE 2-3 VW: CPT

## 2021-09-30 DIAGNOSIS — F98.8 ATTENTION DEFICIT DISORDER (ADD) WITHOUT HYPERACTIVITY: ICD-10-CM

## 2021-09-30 RX ORDER — DEXTROAMPHETAMINE SULFATE, DEXTROAMPHETAMINE SACCHARATE, AMPHETAMINE SULFATE AND AMPHETAMINE ASPARTATE 7.5; 7.5; 7.5; 7.5 MG/1; MG/1; MG/1; MG/1
30 CAPSULE, EXTENDED RELEASE ORAL EVERY MORNING
Qty: 30 CAPSULE | Refills: 0 | Status: SHIPPED | OUTPATIENT
Start: 2021-09-30 | End: 2021-10-26 | Stop reason: SDUPTHER

## 2021-09-30 NOTE — TELEPHONE ENCOUNTER
Caller: Corey Velasquez    Relationship: Self      Medication requested (name and dosage):   Adderall XR 30 MG 24 hr capsule  30 mg, Every Morning       Pharmacy where request should be sent: [facility] - [place], [place] - [address]  - [phone]  - [phone] FX     Additional details provided by patient: PATIENT WILL BE OUT OF MEDICATION TOMORROW     Best call back number:[phone] (M)    Does the patient have less than a 3 day supply:  [x] Yes  [] No    Jeanmarie Whitman Rep   09/30/21 10:06 EDT

## 2021-10-20 ENCOUNTER — PRE-ADMISSION TESTING (OUTPATIENT)
Dept: PREADMISSION TESTING | Facility: HOSPITAL | Age: 66
End: 2021-10-20

## 2021-10-20 ENCOUNTER — HOSPITAL ENCOUNTER (OUTPATIENT)
Dept: GENERAL RADIOLOGY | Facility: HOSPITAL | Age: 66
Discharge: HOME | End: 2021-10-20

## 2021-10-20 VITALS
SYSTOLIC BLOOD PRESSURE: 153 MMHG | OXYGEN SATURATION: 99 % | RESPIRATION RATE: 20 BRPM | HEART RATE: 101 BPM | TEMPERATURE: 99.8 F | HEIGHT: 67 IN | WEIGHT: 211.1 LBS | BODY MASS INDEX: 33.13 KG/M2 | DIASTOLIC BLOOD PRESSURE: 93 MMHG

## 2021-10-20 LAB
ALBUMIN SERPL-MCNC: 4.2 G/DL (ref 3.5–5.2)
ALBUMIN/GLOB SERPL: 1.3 G/DL
ALP SERPL-CCNC: 270 U/L (ref 39–117)
ALT SERPL W P-5'-P-CCNC: 15 U/L (ref 1–41)
ANION GAP SERPL CALCULATED.3IONS-SCNC: 11.1 MMOL/L (ref 5–15)
AST SERPL-CCNC: 20 U/L (ref 1–40)
BILIRUB SERPL-MCNC: 0.2 MG/DL (ref 0–1.2)
BILIRUB UR QL STRIP: NEGATIVE
BUN SERPL-MCNC: 6 MG/DL (ref 8–23)
BUN/CREAT SERPL: 9.1 (ref 7–25)
CALCIUM SPEC-SCNC: 8.4 MG/DL (ref 8.6–10.5)
CHLORIDE SERPL-SCNC: 101 MMOL/L (ref 98–107)
CLARITY UR: CLEAR
CO2 SERPL-SCNC: 24.9 MMOL/L (ref 22–29)
COLOR UR: ABNORMAL
CREAT SERPL-MCNC: 0.66 MG/DL (ref 0.76–1.27)
DEPRECATED RDW RBC AUTO: 38.6 FL (ref 37–54)
ERYTHROCYTE [DISTWIDTH] IN BLOOD BY AUTOMATED COUNT: 11.9 % (ref 12.3–15.4)
GFR SERPL CREATININE-BSD FRML MDRD: 122 ML/MIN/1.73
GLOBULIN UR ELPH-MCNC: 3.3 GM/DL
GLUCOSE SERPL-MCNC: 82 MG/DL (ref 65–99)
GLUCOSE UR STRIP-MCNC: NEGATIVE MG/DL
HCT VFR BLD AUTO: 37 % (ref 37.5–51)
HGB BLD-MCNC: 12.6 G/DL (ref 13–17.7)
HGB UR QL STRIP.AUTO: NEGATIVE
KETONES UR QL STRIP: NEGATIVE
LEUKOCYTE ESTERASE UR QL STRIP.AUTO: NEGATIVE
MCH RBC QN AUTO: 30.6 PG (ref 26.6–33)
MCHC RBC AUTO-ENTMCNC: 34.1 G/DL (ref 31.5–35.7)
MCV RBC AUTO: 89.8 FL (ref 79–97)
NITRITE UR QL STRIP: NEGATIVE
PH UR STRIP.AUTO: 6 [PH] (ref 5–8)
PLATELET # BLD AUTO: 335 10*3/MM3 (ref 140–450)
PMV BLD AUTO: 9.9 FL (ref 6–12)
POTASSIUM SERPL-SCNC: 4 MMOL/L (ref 3.5–5.2)
PROT SERPL-MCNC: 7.5 G/DL (ref 6–8.5)
PROT UR QL STRIP: NEGATIVE
QT INTERVAL: 380 MS
RBC # BLD AUTO: 4.12 10*6/MM3 (ref 4.14–5.8)
SODIUM SERPL-SCNC: 137 MMOL/L (ref 136–145)
SP GR UR STRIP: 1.02 (ref 1–1.03)
UROBILINOGEN UR QL STRIP: ABNORMAL
WBC # BLD AUTO: 6.39 10*3/MM3 (ref 3.4–10.8)

## 2021-10-20 PROCEDURE — 87086 URINE CULTURE/COLONY COUNT: CPT

## 2021-10-20 PROCEDURE — 73560 X-RAY EXAM OF KNEE 1 OR 2: CPT

## 2021-10-20 PROCEDURE — 85027 COMPLETE CBC AUTOMATED: CPT

## 2021-10-20 PROCEDURE — 93010 ELECTROCARDIOGRAM REPORT: CPT | Performed by: INTERNAL MEDICINE

## 2021-10-20 PROCEDURE — 81003 URINALYSIS AUTO W/O SCOPE: CPT

## 2021-10-20 PROCEDURE — 36415 COLL VENOUS BLD VENIPUNCTURE: CPT

## 2021-10-20 PROCEDURE — 93005 ELECTROCARDIOGRAM TRACING: CPT

## 2021-10-20 PROCEDURE — 80053 COMPREHEN METABOLIC PANEL: CPT

## 2021-10-20 NOTE — DISCHARGE INSTRUCTIONS
Take the following medications the morning of surgery:  METOPROLOL, FLUOXETINE    ARRIVE TO MAIN OR DESK 11-1-21:  PLEASE CALL DR. ARANGO OFFICE TO VERIFY ARRIVAL TIME      If you are on prescription narcotic pain medication to control your pain you may also take that medication the morning of surgery.    General Instructions:  • Do not eat solid food after midnight the night before surgery.  • You may drink clear liquids day of surgery but must stop at least one hour before your hospital arrival time.  • It is beneficial for you to have a clear drink that contains carbohydrates the day of surgery.  We suggest a 12 to 20 ounce bottle of Gatorade or Powerade for non-diabetic patients or a 12 to 20 ounce bottle of G2 or Powerade Zero for diabetic patients. (Pediatric patients, are not advised to drink a 12 to 20 ounce carbohydrate drink)    Clear liquids are liquids you can see through.  Nothing red in color.     Plain water                               Sports drinks  Sodas                                   Gelatin (Jell-O)  Fruit juices without pulp such as white grape juice and apple juice  Popsicles that contain no fruit or yogurt  Tea or coffee (no cream or milk added)  Gatorade / Powerade  G2 / Powerade Zero    • Infants may have breast milk up to four hours before surgery.  • Infants drinking formula may drink formula up to six hours before surgery.   • Patients who avoid smoking, chewing tobacco and alcohol for 4 weeks prior to surgery have a reduced risk of post-operative complications.  Quit smoking as many days before surgery as you can.  • Do not smoke, use chewing tobacco or drink alcohol the day of surgery.   • If applicable bring your C-PAP/ BI-PAP machine.  • Bring any papers given to you in the doctor’s office.  • Wear clean comfortable clothes.  • Do not wear contact lenses, false eyelashes or make-up.  Bring a case for your glasses.   • Bring crutches or walker if applicable.  • Remove all  piercings.  Leave jewelry and any other valuables at home.  • Hair extensions with metal clips must be removed prior to surgery.  • The Pre-Admission Testing nurse will instruct you to bring medications if unable to obtain an accurate list in Pre-Admission Testing.        Preventing a Surgical Site Infection:  • For 2 to 3 days before surgery, avoid shaving with a razor because the razor can irritate skin and make it easier to develop an infection.    • Any areas of open skin can increase the risk of a post-operative wound infection by allowing bacteria to enter and travel throughout the body.  Notify your surgeon if you have any skin wounds / rashes even if it is not near the expected surgical site.  The area will need assessed to determine if surgery should be delayed until it is healed.  • The night prior to surgery shower using a fresh bar of anti-bacterial soap (such as Dial) and clean washcloth.  Sleep in a clean bed with clean clothing.  Do not allow pets to sleep with you.  • Shower on the morning of surgery using a fresh bar of anti-bacterial soap (such as Dial) and clean washcloth.  Dry with a clean towel and dress in clean clothing.  • Ask your surgeon if you will be receiving antibiotics prior to surgery.  • Make sure you, your family, and all healthcare providers clean their hands with soap and water or an alcohol based hand  before caring for you or your wound.    Day of surgery:  Your arrival time is approximately two hours before your scheduled surgery time.  Upon arrival, a Pre-op nurse and Anesthesiologist will review your health history, obtain vital signs, and answer questions you may have.  The only belongings needed at this time will be a list of your home medications and if applicable your C-PAP/BI-PAP machine.  A Pre-op nurse will start an IV and you may receive medication in preparation for surgery, including something to help you relax.     Please be aware that surgery does come  with discomfort.  We want to make every effort to control your discomfort so please discuss any uncontrolled symptoms with your nurse.   Your doctor will most likely have prescribed pain medications.      If you are going home after surgery you will receive individualized written care instructions before being discharged.  A responsible adult must drive you to and from the hospital on the day of your surgery and stay with you for 24 hours.  Discharge prescriptions can be filled by the hospital pharmacy during regular pharmacy hours.  If you are having surgery late in the day/evening your prescription may be e-prescribed to your pharmacy.  Please verify your pharmacy hours or chose a 24 hour pharmacy to avoid not having access to your prescription because your pharmacy has closed for the day.    If you are staying overnight following surgery, you will be transported to your hospital room following the recovery period.  [facility] has all private rooms.    If you have any questions please call Pre-Admission Testing at [phone].  Deductibles and co-payments are collected on the day of service. Please be prepared to pay the required co-pay, deductible or deposit on the day of service as defined by your plan.    Patient Education for Self-Quarantine Process    • Following your COVID testing, we strongly recommend that you wear a mask when you are with other people and practice social distancing.   • Limit your activities to only required outings.  • Wash your hands with soap and water frequently for at least 20 seconds.   • Avoid touching your eyes, nose and mouth with unwashed hands.  • Do not share anything - utensils, drinking glasses, food from the same bowl.   • Sanitize household surfaces daily. Include all high touch areas (door handles, light switches, phones, countertops, etc.)    Call your surgeon immediately if you experience any of the following symptoms:  • Sore Throat  • Shortness of  Breath or difficulty breathing  • Cough  • Chills  • Body soreness or muscle pain  • Headache  • Fever  • New loss of taste or smell  • Do not arrive for your surgery ill.  Your procedure will need to be rescheduled to another time.  You will need to call your physician before the day of surgery to avoid any unnecessary exposure to hospital staff as well as other patients.    •   CHLORHEXIDINE CLOTH INSTRUCTIONS  The morning of surgery follow these instructions using the Chlorhexidine cloths you've been given.  These steps reduce bacteria on the body.  Do not use the cloths near your eyes, ears mouth, genitalia or on open wounds.  Throw the cloths away after use but do not try to flush them down a toilet.      • Open and remove one cloth at a time from the package.    • Leave the cloth unfolded and begin the bathing.  • Massage the skin with the cloths using gentle pressure to remove bacteria.  Do not scrub harshly.   • Follow the steps below with one 2% CHG cloth per area (6 total cloths).  • One cloth for neck, shoulders and chest.  • One cloth for both arms, hands, fingers and underarms (do underarms last).  • One cloth for the abdomen followed by groin.  • One cloth for right leg and foot including between the toes.  • One cloth for left leg and foot including between the toes.  • The last cloth is to be used for the back of the neck, back and buttocks.    Allow the CHG to air dry 3 minutes on the skin which will give it time to work and decrease the chance of irritation.  The skin may feel sticky until it is dry.  Do not rinse with water or any other liquid or you will lose the beneficial effects of the CHG.  If mild skin irritation occurs, do rinse the skin to remove the CHG.  Report this to the nurse at time of admission.  Do not apply lotions, creams, ointments, deodorants or perfumes after using the clothes. Dress in clean clothes before coming to the hospital.    BACTROBAN NASAL OINTMENT  There are many  germs normally in your nose. Bactroban is an ointment that will help reduce these germs. Please follow these instructions for Bactroban use:      ____The day before surgery in the morning  Date________    ____The day before surgery in the evening              Date________    ____The day of surgery in the morning    Date________    **Squirt ½ package of Bactroban Ointment onto a cotton applicator and apply to inside of 1st nostril.  Squirt the remaining Bactroban and apply to the inside of the other nostril.

## 2021-10-21 LAB — BACTERIA SPEC AEROBE CULT: NO GROWTH

## 2021-10-26 DIAGNOSIS — F98.8 ATTENTION DEFICIT DISORDER (ADD) WITHOUT HYPERACTIVITY: ICD-10-CM

## 2021-10-26 RX ORDER — DEXTROAMPHETAMINE SULFATE, DEXTROAMPHETAMINE SACCHARATE, AMPHETAMINE SULFATE AND AMPHETAMINE ASPARTATE 7.5; 7.5; 7.5; 7.5 MG/1; MG/1; MG/1; MG/1
30 CAPSULE, EXTENDED RELEASE ORAL EVERY MORNING
Qty: 30 CAPSULE | Refills: 0 | Status: SHIPPED | OUTPATIENT
Start: 2021-10-26 | End: 2021-12-02 | Stop reason: SDUPTHER

## 2021-10-26 NOTE — TELEPHONE ENCOUNTER
Caller: Corey Velasquez    Relationship: Self      Medication requested (name and dosage):    Requested Prescriptions:   Requested Prescriptions     Pending Prescriptions Disp Refills   • Adderall XR 30 MG 24 hr capsule 30 capsule 0     Sig: Take 1 capsule by mouth Every Morning        Pharmacy where request should be sent: [facility] [address] [place] [address], [place] - [address] - [phone]  - [phone]  [phone]      Best call back number: [phone]     Does the patient have less than a 3 day supply:  [x] Yes  [] No    Jeanmarie Echavarria Rep   10/26/21 09:57 EDT

## 2021-10-29 ENCOUNTER — LAB (OUTPATIENT)
Dept: LAB | Facility: HOSPITAL | Age: 66
End: 2021-10-29

## 2021-10-29 DIAGNOSIS — Z01.818 OTHER SPECIFIED PRE-OPERATIVE EXAMINATION: Primary | ICD-10-CM

## 2021-10-29 LAB — SARS-COV-2 ORF1AB RESP QL NAA+PROBE: NOT DETECTED

## 2021-10-29 PROCEDURE — U0004 COV-19 TEST NON-CDC HGH THRU: HCPCS

## 2021-10-29 PROCEDURE — C9803 HOPD COVID-19 SPEC COLLECT: HCPCS

## 2021-11-01 ENCOUNTER — APPOINTMENT (OUTPATIENT)
Dept: GENERAL RADIOLOGY | Facility: HOSPITAL | Age: 66
End: 2021-11-01

## 2021-11-01 ENCOUNTER — HOSPITAL ENCOUNTER (OUTPATIENT)
Facility: HOSPITAL | Age: 66
Discharge: HOME HEALTH | End: 2021-11-02
Attending: ORTHOPAEDIC SURGERY | Admitting: ORTHOPAEDIC SURGERY

## 2021-11-01 ENCOUNTER — ANESTHESIA EVENT (OUTPATIENT)
Dept: PERIOP | Facility: HOSPITAL | Age: 66
End: 2021-11-01

## 2021-11-01 ENCOUNTER — ANESTHESIA (OUTPATIENT)
Dept: PERIOP | Facility: HOSPITAL | Age: 66
End: 2021-11-01

## 2021-11-01 DIAGNOSIS — T84.093A FAILED TOTAL KNEE, LEFT, INITIAL ENCOUNTER (HCC): Primary | ICD-10-CM

## 2021-11-01 LAB
HCT VFR BLD AUTO: 34.4 % (ref 37.5–51)
HGB BLD-MCNC: 11.9 G/DL (ref 13–17.7)

## 2021-11-01 PROCEDURE — G0378 HOSPITAL OBSERVATION PER HR: HCPCS

## 2021-11-01 PROCEDURE — 25010000002 HYDROMORPHONE PER 4 MG: Performed by: REGISTERED NURSE

## 2021-11-01 PROCEDURE — C1776 JOINT DEVICE (IMPLANTABLE): HCPCS | Performed by: ORTHOPAEDIC SURGERY

## 2021-11-01 PROCEDURE — C1713 ANCHOR/SCREW BN/BN,TIS/BN: HCPCS | Performed by: ORTHOPAEDIC SURGERY

## 2021-11-01 PROCEDURE — 25010000002 DEXAMETHASONE PER 1 MG: Performed by: NURSE ANESTHETIST, CERTIFIED REGISTERED

## 2021-11-01 PROCEDURE — 25010000002 ONDANSETRON PER 1 MG: Performed by: NURSE ANESTHETIST, CERTIFIED REGISTERED

## 2021-11-01 PROCEDURE — 25010000002 MORPHINE PER 10 MG

## 2021-11-01 PROCEDURE — 25010000002 FENTANYL CITRATE (PF) 50 MCG/ML SOLUTION: Performed by: ANESTHESIOLOGY

## 2021-11-01 PROCEDURE — 76942 ECHO GUIDE FOR BIOPSY: CPT | Performed by: ORTHOPAEDIC SURGERY

## 2021-11-01 PROCEDURE — 25010000002 HYDROMORPHONE PER 4 MG: Performed by: NURSE ANESTHETIST, CERTIFIED REGISTERED

## 2021-11-01 PROCEDURE — 25010000002 MIDAZOLAM PER 1 MG: Performed by: ANESTHESIOLOGY

## 2021-11-01 PROCEDURE — 85018 HEMOGLOBIN: CPT | Performed by: ORTHOPAEDIC SURGERY

## 2021-11-01 PROCEDURE — C1889 IMPLANT/INSERT DEVICE, NOC: HCPCS | Performed by: ORTHOPAEDIC SURGERY

## 2021-11-01 PROCEDURE — 85014 HEMATOCRIT: CPT | Performed by: ORTHOPAEDIC SURGERY

## 2021-11-01 PROCEDURE — 0 CEFAZOLIN IN DEXTROSE 2-4 GM/100ML-% SOLUTION: Performed by: ORTHOPAEDIC SURGERY

## 2021-11-01 PROCEDURE — 25010000002 PROPOFOL 10 MG/ML EMULSION: Performed by: NURSE ANESTHETIST, CERTIFIED REGISTERED

## 2021-11-01 PROCEDURE — 25010000002 ROPIVACAINE PER 1 MG: Performed by: ANESTHESIOLOGY

## 2021-11-01 PROCEDURE — 25010000002 KETOROLAC TROMETHAMINE PER 15 MG

## 2021-11-01 PROCEDURE — 73552 X-RAY EXAM OF FEMUR 2/>: CPT

## 2021-11-01 PROCEDURE — 73560 X-RAY EXAM OF KNEE 1 OR 2: CPT

## 2021-11-01 PROCEDURE — 25010000002 ROPIVACAINE PER 1 MG

## 2021-11-01 PROCEDURE — 25010000002 NEOSTIGMINE 5 MG/10ML SOLUTION: Performed by: NURSE ANESTHETIST, CERTIFIED REGISTERED

## 2021-11-01 PROCEDURE — 25010000002 FENTANYL CITRATE (PF) 50 MCG/ML SOLUTION: Performed by: REGISTERED NURSE

## 2021-11-01 PROCEDURE — 25010000002 EPINEPHRINE 1 MG/ML SOLUTION

## 2021-11-01 PROCEDURE — 25010000002 FENTANYL CITRATE (PF) 50 MCG/ML SOLUTION: Performed by: NURSE ANESTHETIST, CERTIFIED REGISTERED

## 2021-11-01 PROCEDURE — 63710000001 ONDANSETRON PER 8 MG: Performed by: ORTHOPAEDIC SURGERY

## 2021-11-01 PROCEDURE — 25010000002 PHENYLEPHRINE 10 MG/ML SOLUTION: Performed by: NURSE ANESTHETIST, CERTIFIED REGISTERED

## 2021-11-01 DEVICE — PALACOS® R+G IS A FAST SETTING POLYMER CONTAINING GENTAMICIN, FOR USE IN BONE SURGERY. MIXING OF THE TWO COMPONENT SYSTEM, CONSISTING OF A POWDER AND A LIQUID, INITIALLY PRODUCES A LIQUID AND THEN A PASTE, WHICH IS USED TO ANCHOR THE PROSTHESIS TO THE BONE. THE HARDENED BONE CEMENT ALLOWS STABLE FIXATION OF THE PROSTHESIS AND TRANSFERS ALL STRESSES PRODUCED IN A MOVEMENT TO THE BONE VIA THE LARGE INTERFACE. INSOLUBLE ZIRCONIUM DIOXIDE IS INCLUDED IN THE CEMENT POWDER AS AN X RAY CONTRAST MEDIUM. THE CHLOROPHYLL ADDITIVE SERVES AS OPTICAL MARKING OF THE BONE CEMENT AT THE SITE OF THE OPERATION.
Type: IMPLANTABLE DEVICE | Site: KNEE | Status: FUNCTIONAL
Brand: PALACOS®

## 2021-11-01 DEVICE — P.F.C. SIGMA FEMORAL ADAPTER 5 DEGREE
Type: IMPLANTABLE DEVICE | Site: KNEE | Status: FUNCTIONAL
Brand: P.F.C. SIGMA

## 2021-11-01 DEVICE — UNIVERSAL FEMORAL SLEEVE DISTAL POROUS 31MM: Type: IMPLANTABLE DEVICE | Site: KNEE | Status: FUNCTIONAL

## 2021-11-01 DEVICE — SIGMA FEMORAL TC3 CEMENTED 4 LEFT
Type: IMPLANTABLE DEVICE | Site: KNEE | Status: FUNCTIONAL
Brand: SIGMA

## 2021-11-01 DEVICE — SIGMA TIBIAL INSERT ROTATING PLATFORM TC3 SIZE 4 15MM GVF
Type: IMPLANTABLE DEVICE | Site: KNEE | Status: FUNCTIONAL
Brand: SIGMA

## 2021-11-01 DEVICE — DEV CONTRL TISS STRATAFIX SYMM PDS PLUS VIL CT-1 45CM: Type: IMPLANTABLE DEVICE | Site: KNEE | Status: FUNCTIONAL

## 2021-11-01 DEVICE — P.F.C. SIGMA FEMORAL ADAPTER BOLT +2/-2
Type: IMPLANTABLE DEVICE | Site: KNEE | Status: FUNCTIONAL
Brand: P.F.C. SIGMA

## 2021-11-01 DEVICE — P.F.C. SIGMA OVAL DOME PATELLA 3-PEG 38MM CEMENTED
Type: IMPLANTABLE DEVICE | Site: KNEE | Status: FUNCTIONAL
Brand: P.F.C. SIGMA

## 2021-11-01 DEVICE — UNIVERSAL STEM FLUTED 75MM X 16MM: Type: IMPLANTABLE DEVICE | Site: KNEE | Status: FUNCTIONAL

## 2021-11-01 DEVICE — DEV CONTRL TISS STRATAFIX SPIRAL MNCRYL UD 3/0 PLS 30CM: Type: IMPLANTABLE DEVICE | Site: KNEE | Status: FUNCTIONAL

## 2021-11-01 RX ORDER — GLYCOPYRROLATE 0.2 MG/ML
INJECTION INTRAMUSCULAR; INTRAVENOUS AS NEEDED
Status: DISCONTINUED | OUTPATIENT
Start: 2021-11-01 | End: 2021-11-01 | Stop reason: SURG

## 2021-11-01 RX ORDER — FENTANYL CITRATE 50 UG/ML
50 INJECTION, SOLUTION INTRAMUSCULAR; INTRAVENOUS ONCE
Status: DISCONTINUED | OUTPATIENT
Start: 2021-11-01 | End: 2021-11-01 | Stop reason: HOSPADM

## 2021-11-01 RX ORDER — NALOXONE HCL 0.4 MG/ML
0.1 VIAL (ML) INJECTION
Status: DISCONTINUED | OUTPATIENT
Start: 2021-11-01 | End: 2021-11-02 | Stop reason: HOSPADM

## 2021-11-01 RX ORDER — NEOSTIGMINE METHYLSULFATE 0.5 MG/ML
INJECTION, SOLUTION INTRAVENOUS AS NEEDED
Status: DISCONTINUED | OUTPATIENT
Start: 2021-11-01 | End: 2021-11-01 | Stop reason: SURG

## 2021-11-01 RX ORDER — OXYCODONE HYDROCHLORIDE AND ACETAMINOPHEN 5; 325 MG/1; MG/1
2 TABLET ORAL EVERY 4 HOURS PRN
Status: DISCONTINUED | OUTPATIENT
Start: 2021-11-01 | End: 2021-11-02

## 2021-11-01 RX ORDER — LIDOCAINE HYDROCHLORIDE 20 MG/ML
INJECTION, SOLUTION INFILTRATION; PERINEURAL AS NEEDED
Status: DISCONTINUED | OUTPATIENT
Start: 2021-11-01 | End: 2021-11-01 | Stop reason: SURG

## 2021-11-01 RX ORDER — OXYCODONE AND ACETAMINOPHEN 10; 325 MG/1; MG/1
2 TABLET ORAL EVERY 4 HOURS PRN
Status: DISCONTINUED | OUTPATIENT
Start: 2021-11-01 | End: 2021-11-02

## 2021-11-01 RX ORDER — ONDANSETRON 2 MG/ML
4 INJECTION INTRAMUSCULAR; INTRAVENOUS EVERY 6 HOURS PRN
Status: DISCONTINUED | OUTPATIENT
Start: 2021-11-01 | End: 2021-11-02 | Stop reason: HOSPADM

## 2021-11-01 RX ORDER — ACETAMINOPHEN 325 MG/1
325 TABLET ORAL EVERY 4 HOURS PRN
Status: DISCONTINUED | OUTPATIENT
Start: 2021-11-01 | End: 2021-11-02 | Stop reason: HOSPADM

## 2021-11-01 RX ORDER — PROMETHAZINE HYDROCHLORIDE 25 MG/1
25 TABLET ORAL ONCE AS NEEDED
Status: DISCONTINUED | OUTPATIENT
Start: 2021-11-01 | End: 2021-11-01 | Stop reason: HOSPADM

## 2021-11-01 RX ORDER — SODIUM CHLORIDE 0.9 % (FLUSH) 0.9 %
10 SYRINGE (ML) INJECTION EVERY 12 HOURS SCHEDULED
Status: DISCONTINUED | OUTPATIENT
Start: 2021-11-01 | End: 2021-11-01

## 2021-11-01 RX ORDER — SODIUM CHLORIDE, SODIUM LACTATE, POTASSIUM CHLORIDE, CALCIUM CHLORIDE 600; 310; 30; 20 MG/100ML; MG/100ML; MG/100ML; MG/100ML
100 INJECTION, SOLUTION INTRAVENOUS CONTINUOUS
Status: DISCONTINUED | OUTPATIENT
Start: 2021-11-01 | End: 2021-11-02 | Stop reason: HOSPADM

## 2021-11-01 RX ORDER — DEXTROAMPHETAMINE SACCHARATE, AMPHETAMINE ASPARTATE MONOHYDRATE, DEXTROAMPHETAMINE SULFATE AND AMPHETAMINE SULFATE 2.5; 2.5; 2.5; 2.5 MG/1; MG/1; MG/1; MG/1
30 CAPSULE, EXTENDED RELEASE ORAL DAILY
Status: DISCONTINUED | OUTPATIENT
Start: 2021-11-01 | End: 2021-11-02 | Stop reason: HOSPADM

## 2021-11-01 RX ORDER — FENTANYL CITRATE 50 UG/ML
INJECTION, SOLUTION INTRAMUSCULAR; INTRAVENOUS
Status: COMPLETED | OUTPATIENT
Start: 2021-11-01 | End: 2021-11-01

## 2021-11-01 RX ORDER — DEXAMETHASONE SODIUM PHOSPHATE 4 MG/ML
INJECTION, SOLUTION INTRA-ARTICULAR; INTRALESIONAL; INTRAMUSCULAR; INTRAVENOUS; SOFT TISSUE AS NEEDED
Status: DISCONTINUED | OUTPATIENT
Start: 2021-11-01 | End: 2021-11-01 | Stop reason: SURG

## 2021-11-01 RX ORDER — ONDANSETRON 2 MG/ML
INJECTION INTRAMUSCULAR; INTRAVENOUS AS NEEDED
Status: DISCONTINUED | OUTPATIENT
Start: 2021-11-01 | End: 2021-11-01 | Stop reason: SURG

## 2021-11-01 RX ORDER — METOPROLOL SUCCINATE 25 MG/1
25 TABLET, EXTENDED RELEASE ORAL DAILY
Status: DISCONTINUED | OUTPATIENT
Start: 2021-11-01 | End: 2021-11-02 | Stop reason: HOSPADM

## 2021-11-01 RX ORDER — EPHEDRINE SULFATE 50 MG/ML
INJECTION, SOLUTION INTRAVENOUS AS NEEDED
Status: DISCONTINUED | OUTPATIENT
Start: 2021-11-01 | End: 2021-11-01 | Stop reason: SURG

## 2021-11-01 RX ORDER — DOCUSATE SODIUM 100 MG/1
200 CAPSULE, LIQUID FILLED ORAL 2 TIMES DAILY
Status: DISCONTINUED | OUTPATIENT
Start: 2021-11-01 | End: 2021-11-02 | Stop reason: HOSPADM

## 2021-11-01 RX ORDER — FENTANYL CITRATE 50 UG/ML
50 INJECTION, SOLUTION INTRAMUSCULAR; INTRAVENOUS
Status: DISCONTINUED | OUTPATIENT
Start: 2021-11-01 | End: 2021-11-01 | Stop reason: HOSPADM

## 2021-11-01 RX ORDER — NALOXONE HCL 0.4 MG/ML
0.2 VIAL (ML) INJECTION AS NEEDED
Status: DISCONTINUED | OUTPATIENT
Start: 2021-11-01 | End: 2021-11-01 | Stop reason: HOSPADM

## 2021-11-01 RX ORDER — EPHEDRINE SULFATE 50 MG/ML
5 INJECTION, SOLUTION INTRAVENOUS ONCE AS NEEDED
Status: DISCONTINUED | OUTPATIENT
Start: 2021-11-01 | End: 2021-11-01 | Stop reason: HOSPADM

## 2021-11-01 RX ORDER — HYDROMORPHONE HYDROCHLORIDE 1 MG/ML
0.5 INJECTION, SOLUTION INTRAMUSCULAR; INTRAVENOUS; SUBCUTANEOUS
Status: DISCONTINUED | OUTPATIENT
Start: 2021-11-01 | End: 2021-11-01 | Stop reason: HOSPADM

## 2021-11-01 RX ORDER — KETAMINE HYDROCHLORIDE 10 MG/ML
INJECTION INTRAMUSCULAR; INTRAVENOUS AS NEEDED
Status: DISCONTINUED | OUTPATIENT
Start: 2021-11-01 | End: 2021-11-01 | Stop reason: SURG

## 2021-11-01 RX ORDER — ONDANSETRON 4 MG/1
4 TABLET, FILM COATED ORAL EVERY 6 HOURS PRN
Status: DISCONTINUED | OUTPATIENT
Start: 2021-11-01 | End: 2021-11-02 | Stop reason: HOSPADM

## 2021-11-01 RX ORDER — IBUPROFEN 600 MG/1
600 TABLET ORAL ONCE AS NEEDED
Status: DISCONTINUED | OUTPATIENT
Start: 2021-11-01 | End: 2021-11-01 | Stop reason: HOSPADM

## 2021-11-01 RX ORDER — MIDAZOLAM HYDROCHLORIDE 1 MG/ML
1 INJECTION INTRAMUSCULAR; INTRAVENOUS
Status: DISCONTINUED | OUTPATIENT
Start: 2021-11-01 | End: 2021-11-01

## 2021-11-01 RX ORDER — SODIUM CHLORIDE, SODIUM LACTATE, POTASSIUM CHLORIDE, CALCIUM CHLORIDE 600; 310; 30; 20 MG/100ML; MG/100ML; MG/100ML; MG/100ML
9 INJECTION, SOLUTION INTRAVENOUS CONTINUOUS PRN
Status: DISCONTINUED | OUTPATIENT
Start: 2021-11-01 | End: 2021-11-01 | Stop reason: HOSPADM

## 2021-11-01 RX ORDER — SODIUM CHLORIDE 0.9 % (FLUSH) 0.9 %
1-10 SYRINGE (ML) INJECTION AS NEEDED
Status: DISCONTINUED | OUTPATIENT
Start: 2021-11-01 | End: 2021-11-02 | Stop reason: HOSPADM

## 2021-11-01 RX ORDER — ROCURONIUM BROMIDE 10 MG/ML
INJECTION, SOLUTION INTRAVENOUS AS NEEDED
Status: DISCONTINUED | OUTPATIENT
Start: 2021-11-01 | End: 2021-11-01 | Stop reason: SURG

## 2021-11-01 RX ORDER — FAMOTIDINE 20 MG/1
40 TABLET, FILM COATED ORAL DAILY
Status: DISCONTINUED | OUTPATIENT
Start: 2021-11-01 | End: 2021-11-02 | Stop reason: HOSPADM

## 2021-11-01 RX ORDER — LABETALOL HYDROCHLORIDE 5 MG/ML
5 INJECTION, SOLUTION INTRAVENOUS
Status: DISCONTINUED | OUTPATIENT
Start: 2021-11-01 | End: 2021-11-01 | Stop reason: HOSPADM

## 2021-11-01 RX ORDER — CEFAZOLIN SODIUM 2 G/100ML
2 INJECTION, SOLUTION INTRAVENOUS ONCE
Status: DISCONTINUED | OUTPATIENT
Start: 2021-11-01 | End: 2021-11-01

## 2021-11-01 RX ORDER — ROPIVACAINE HYDROCHLORIDE 5 MG/ML
INJECTION, SOLUTION EPIDURAL; INFILTRATION; PERINEURAL
Status: COMPLETED | OUTPATIENT
Start: 2021-11-01 | End: 2021-11-01

## 2021-11-01 RX ORDER — SODIUM CHLORIDE 0.9 % (FLUSH) 0.9 %
10 SYRINGE (ML) INJECTION AS NEEDED
Status: DISCONTINUED | OUTPATIENT
Start: 2021-11-01 | End: 2021-11-01

## 2021-11-01 RX ORDER — MIDAZOLAM HYDROCHLORIDE 1 MG/ML
INJECTION INTRAMUSCULAR; INTRAVENOUS
Status: COMPLETED | OUTPATIENT
Start: 2021-11-01 | End: 2021-11-01

## 2021-11-01 RX ORDER — HYDROMORPHONE HYDROCHLORIDE 1 MG/ML
0.5 INJECTION, SOLUTION INTRAMUSCULAR; INTRAVENOUS; SUBCUTANEOUS
Status: DISCONTINUED | OUTPATIENT
Start: 2021-11-01 | End: 2021-11-02 | Stop reason: HOSPADM

## 2021-11-01 RX ORDER — HYDROCODONE BITARTRATE AND ACETAMINOPHEN 7.5; 325 MG/1; MG/1
1 TABLET ORAL ONCE AS NEEDED
Status: DISCONTINUED | OUTPATIENT
Start: 2021-11-01 | End: 2021-11-01 | Stop reason: HOSPADM

## 2021-11-01 RX ORDER — DIPHENHYDRAMINE HCL 25 MG
25 CAPSULE ORAL
Status: DISCONTINUED | OUTPATIENT
Start: 2021-11-01 | End: 2021-11-01 | Stop reason: HOSPADM

## 2021-11-01 RX ORDER — DIPHENHYDRAMINE HYDROCHLORIDE 50 MG/ML
12.5 INJECTION INTRAMUSCULAR; INTRAVENOUS
Status: DISCONTINUED | OUTPATIENT
Start: 2021-11-01 | End: 2021-11-01 | Stop reason: HOSPADM

## 2021-11-01 RX ORDER — OXYCODONE AND ACETAMINOPHEN 10; 325 MG/1; MG/1
1 TABLET ORAL EVERY 4 HOURS PRN
Status: DISCONTINUED | OUTPATIENT
Start: 2021-11-01 | End: 2021-11-01 | Stop reason: HOSPADM

## 2021-11-01 RX ORDER — UREA 10 %
1 LOTION (ML) TOPICAL NIGHTLY PRN
Status: DISCONTINUED | OUTPATIENT
Start: 2021-11-01 | End: 2021-11-02 | Stop reason: HOSPADM

## 2021-11-01 RX ORDER — ONDANSETRON 2 MG/ML
4 INJECTION INTRAMUSCULAR; INTRAVENOUS ONCE AS NEEDED
Status: DISCONTINUED | OUTPATIENT
Start: 2021-11-01 | End: 2021-11-01 | Stop reason: HOSPADM

## 2021-11-01 RX ORDER — FLUOXETINE HYDROCHLORIDE 20 MG/1
40 CAPSULE ORAL DAILY
Status: DISCONTINUED | OUTPATIENT
Start: 2021-11-01 | End: 2021-11-02 | Stop reason: HOSPADM

## 2021-11-01 RX ORDER — ASPIRIN 81 MG/1
81 TABLET ORAL EVERY 12 HOURS SCHEDULED
Status: DISCONTINUED | OUTPATIENT
Start: 2021-11-02 | End: 2021-11-02 | Stop reason: HOSPADM

## 2021-11-01 RX ORDER — MAGNESIUM HYDROXIDE 1200 MG/15ML
LIQUID ORAL AS NEEDED
Status: DISCONTINUED | OUTPATIENT
Start: 2021-11-01 | End: 2021-11-01 | Stop reason: HOSPADM

## 2021-11-01 RX ORDER — CEFAZOLIN SODIUM 2 G/100ML
2 INJECTION, SOLUTION INTRAVENOUS EVERY 8 HOURS
Status: COMPLETED | OUTPATIENT
Start: 2021-11-01 | End: 2021-11-02

## 2021-11-01 RX ORDER — SODIUM CHLORIDE 0.9 % (FLUSH) 0.9 %
3 SYRINGE (ML) INJECTION EVERY 12 HOURS SCHEDULED
Status: DISCONTINUED | OUTPATIENT
Start: 2021-11-01 | End: 2021-11-02 | Stop reason: HOSPADM

## 2021-11-01 RX ORDER — PROPOFOL 10 MG/ML
VIAL (ML) INTRAVENOUS AS NEEDED
Status: DISCONTINUED | OUTPATIENT
Start: 2021-11-01 | End: 2021-11-01 | Stop reason: SURG

## 2021-11-01 RX ORDER — HYDRALAZINE HYDROCHLORIDE 20 MG/ML
5 INJECTION INTRAMUSCULAR; INTRAVENOUS
Status: DISCONTINUED | OUTPATIENT
Start: 2021-11-01 | End: 2021-11-01 | Stop reason: HOSPADM

## 2021-11-01 RX ORDER — HYDROMORPHONE HCL 110MG/55ML
PATIENT CONTROLLED ANALGESIA SYRINGE INTRAVENOUS AS NEEDED
Status: DISCONTINUED | OUTPATIENT
Start: 2021-11-01 | End: 2021-11-01 | Stop reason: SURG

## 2021-11-01 RX ORDER — FLUMAZENIL 0.1 MG/ML
0.2 INJECTION INTRAVENOUS AS NEEDED
Status: DISCONTINUED | OUTPATIENT
Start: 2021-11-01 | End: 2021-11-01 | Stop reason: HOSPADM

## 2021-11-01 RX ORDER — FENTANYL CITRATE 50 UG/ML
INJECTION, SOLUTION INTRAMUSCULAR; INTRAVENOUS AS NEEDED
Status: DISCONTINUED | OUTPATIENT
Start: 2021-11-01 | End: 2021-11-01 | Stop reason: SURG

## 2021-11-01 RX ORDER — TRANEXAMIC ACID 100 MG/ML
INJECTION, SOLUTION INTRAVENOUS AS NEEDED
Status: DISCONTINUED | OUTPATIENT
Start: 2021-11-01 | End: 2021-11-01 | Stop reason: SURG

## 2021-11-01 RX ORDER — PHENYLEPHRINE HYDROCHLORIDE 10 MG/ML
INJECTION INTRAVENOUS AS NEEDED
Status: DISCONTINUED | OUTPATIENT
Start: 2021-11-01 | End: 2021-11-01 | Stop reason: SURG

## 2021-11-01 RX ORDER — PROMETHAZINE HYDROCHLORIDE 25 MG/1
25 SUPPOSITORY RECTAL ONCE AS NEEDED
Status: DISCONTINUED | OUTPATIENT
Start: 2021-11-01 | End: 2021-11-01 | Stop reason: HOSPADM

## 2021-11-01 RX ORDER — SODIUM CHLORIDE, SODIUM LACTATE, POTASSIUM CHLORIDE, CALCIUM CHLORIDE 600; 310; 30; 20 MG/100ML; MG/100ML; MG/100ML; MG/100ML
INJECTION, SOLUTION INTRAVENOUS CONTINUOUS PRN
Status: DISCONTINUED | OUTPATIENT
Start: 2021-11-01 | End: 2021-11-01 | Stop reason: SURG

## 2021-11-01 RX ORDER — FAMOTIDINE 10 MG/ML
20 INJECTION, SOLUTION INTRAVENOUS
Status: COMPLETED | OUTPATIENT
Start: 2021-11-01 | End: 2021-11-01

## 2021-11-01 RX ADMIN — SODIUM CHLORIDE, POTASSIUM CHLORIDE, SODIUM LACTATE AND CALCIUM CHLORIDE: 600; 310; 30; 20 INJECTION, SOLUTION INTRAVENOUS at 14:12

## 2021-11-01 RX ADMIN — CEFAZOLIN SODIUM 2 G: 2 INJECTION, SOLUTION INTRAVENOUS at 21:32

## 2021-11-01 RX ADMIN — EPHEDRINE SULFATE 10 MG: 50 INJECTION INTRAVENOUS at 14:19

## 2021-11-01 RX ADMIN — GLYCOPYRROLATE 0.2 MG: 0.2 INJECTION INTRAMUSCULAR; INTRAVENOUS at 14:19

## 2021-11-01 RX ADMIN — PHENYLEPHRINE HYDROCHLORIDE 150 MCG: 10 INJECTION, SOLUTION INTRAVENOUS at 12:50

## 2021-11-01 RX ADMIN — MIDAZOLAM 2 MG: 1 INJECTION INTRAMUSCULAR; INTRAVENOUS at 09:28

## 2021-11-01 RX ADMIN — FENTANYL CITRATE 50 MCG: 0.05 INJECTION, SOLUTION INTRAMUSCULAR; INTRAVENOUS at 09:34

## 2021-11-01 RX ADMIN — ROPIVACAINE HYDROCHLORIDE 30 ML: 5 INJECTION, SOLUTION EPIDURAL; INFILTRATION; PERINEURAL at 09:30

## 2021-11-01 RX ADMIN — HYDROMORPHONE HYDROCHLORIDE 0.5 MG: 2 INJECTION, SOLUTION INTRAMUSCULAR; INTRAVENOUS; SUBCUTANEOUS at 14:12

## 2021-11-01 RX ADMIN — NEOSTIGMINE METHYLSULFATE 1.5 MG: 0.5 INJECTION INTRAVENOUS at 14:09

## 2021-11-01 RX ADMIN — FENTANYL CITRATE 50 MCG: 50 INJECTION INTRAMUSCULAR; INTRAVENOUS at 15:06

## 2021-11-01 RX ADMIN — FENTANYL CITRATE 50 MCG: 0.05 INJECTION, SOLUTION INTRAMUSCULAR; INTRAVENOUS at 12:44

## 2021-11-01 RX ADMIN — TRANEXAMIC ACID 1000 MG: 1 INJECTION, SOLUTION INTRAVENOUS at 12:32

## 2021-11-01 RX ADMIN — PROPOFOL 170 MG: 10 INJECTION, EMULSION INTRAVENOUS at 12:13

## 2021-11-01 RX ADMIN — SODIUM CHLORIDE, POTASSIUM CHLORIDE, SODIUM LACTATE AND CALCIUM CHLORIDE 9 ML/HR: 600; 310; 30; 20 INJECTION, SOLUTION INTRAVENOUS at 08:59

## 2021-11-01 RX ADMIN — OXYCODONE AND ACETAMINOPHEN 2 TABLET: 325; 10 TABLET ORAL at 17:25

## 2021-11-01 RX ADMIN — EPHEDRINE SULFATE 10 MG: 50 INJECTION INTRAVENOUS at 12:46

## 2021-11-01 RX ADMIN — DOCUSATE SODIUM 200 MG: 100 CAPSULE, LIQUID FILLED ORAL at 21:32

## 2021-11-01 RX ADMIN — SODIUM CHLORIDE, POTASSIUM CHLORIDE, SODIUM LACTATE AND CALCIUM CHLORIDE 100 ML/HR: 600; 310; 30; 20 INJECTION, SOLUTION INTRAVENOUS at 17:30

## 2021-11-01 RX ADMIN — GLYCOPYRROLATE 0.2 MG: 0.2 INJECTION INTRAMUSCULAR; INTRAVENOUS at 12:59

## 2021-11-01 RX ADMIN — DEXAMETHASONE SODIUM PHOSPHATE 8 MG: 4 INJECTION, SOLUTION INTRAMUSCULAR; INTRAVENOUS at 12:34

## 2021-11-01 RX ADMIN — FENTANYL CITRATE 50 MCG: 0.05 INJECTION, SOLUTION INTRAMUSCULAR; INTRAVENOUS at 12:10

## 2021-11-01 RX ADMIN — GLYCOPYRROLATE 0.2 MG: 0.2 INJECTION INTRAMUSCULAR; INTRAVENOUS at 14:09

## 2021-11-01 RX ADMIN — HYDROMORPHONE HYDROCHLORIDE 0.5 MG: 1 INJECTION, SOLUTION INTRAMUSCULAR; INTRAVENOUS; SUBCUTANEOUS at 15:22

## 2021-11-01 RX ADMIN — EPHEDRINE SULFATE 5 MG: 50 INJECTION INTRAVENOUS at 12:38

## 2021-11-01 RX ADMIN — LIDOCAINE HYDROCHLORIDE 100 MG: 20 INJECTION, SOLUTION INFILTRATION; PERINEURAL at 12:13

## 2021-11-01 RX ADMIN — OXYCODONE AND ACETAMINOPHEN 2 TABLET: 325; 10 TABLET ORAL at 21:44

## 2021-11-01 RX ADMIN — PHENYLEPHRINE HYDROCHLORIDE 100 MCG: 10 INJECTION, SOLUTION INTRAVENOUS at 12:31

## 2021-11-01 RX ADMIN — EPHEDRINE SULFATE 15 MG: 50 INJECTION INTRAVENOUS at 12:28

## 2021-11-01 RX ADMIN — PHENYLEPHRINE HYDROCHLORIDE 200 MCG: 10 INJECTION, SOLUTION INTRAVENOUS at 13:26

## 2021-11-01 RX ADMIN — EPHEDRINE SULFATE 5 MG: 50 INJECTION INTRAVENOUS at 12:50

## 2021-11-01 RX ADMIN — FAMOTIDINE 40 MG: 20 TABLET, FILM COATED ORAL at 17:25

## 2021-11-01 RX ADMIN — ONDANSETRON HYDROCHLORIDE 4 MG: 4 TABLET, FILM COATED ORAL at 17:25

## 2021-11-01 RX ADMIN — PHENYLEPHRINE HYDROCHLORIDE 200 MCG: 10 INJECTION, SOLUTION INTRAVENOUS at 13:11

## 2021-11-01 RX ADMIN — FAMOTIDINE 20 MG: 10 INJECTION INTRAVENOUS at 08:59

## 2021-11-01 RX ADMIN — EPHEDRINE SULFATE 5 MG: 50 INJECTION INTRAVENOUS at 12:31

## 2021-11-01 RX ADMIN — ROCURONIUM BROMIDE 50 MG: 50 INJECTION INTRAVENOUS at 12:13

## 2021-11-01 RX ADMIN — SODIUM CHLORIDE, POTASSIUM CHLORIDE, SODIUM LACTATE AND CALCIUM CHLORIDE: 600; 310; 30; 20 INJECTION, SOLUTION INTRAVENOUS at 12:10

## 2021-11-01 RX ADMIN — KETAMINE HYDROCHLORIDE 30 MG: 10 INJECTION INTRAMUSCULAR; INTRAVENOUS at 12:57

## 2021-11-01 RX ADMIN — PHENYLEPHRINE HYDROCHLORIDE 100 MCG: 10 INJECTION, SOLUTION INTRAVENOUS at 12:38

## 2021-11-01 RX ADMIN — PHENYLEPHRINE HYDROCHLORIDE 200 MCG: 10 INJECTION, SOLUTION INTRAVENOUS at 12:59

## 2021-11-01 RX ADMIN — FENTANYL CITRATE 50 MCG: 0.05 INJECTION, SOLUTION INTRAMUSCULAR; INTRAVENOUS at 09:30

## 2021-11-01 RX ADMIN — MIDAZOLAM 1 MG: 1 INJECTION INTRAMUSCULAR; INTRAVENOUS at 08:59

## 2021-11-01 RX ADMIN — ONDANSETRON 4 MG: 2 INJECTION INTRAMUSCULAR; INTRAVENOUS at 14:03

## 2021-11-01 RX ADMIN — CEFAZOLIN SODIUM 2 G: 2 INJECTION, SOLUTION INTRAVENOUS at 12:03

## 2021-11-01 RX ADMIN — KETAMINE HYDROCHLORIDE 20 MG: 10 INJECTION INTRAMUSCULAR; INTRAVENOUS at 13:12

## 2021-11-01 NOTE — ANESTHESIA PROCEDURE NOTES
Peripheral Block      Patient reassessed immediately prior to procedure    Patient location during procedure: holding area  Start time: 11/1/2021 9:20 AM  Stop time: 11/1/2021 9:38 AM  Reason for block: at surgeon's request and post-op pain management  Performed by  Anesthesiologist: Bhupendra Galindo MD  Preanesthetic Checklist  Completed: patient identified, IV checked, site marked, risks and benefits discussed, surgical consent, monitors and equipment checked, pre-op evaluation and timeout performed  Prep:  Pt Position: supine  Sterile barriers:gloves, cap and mask  Prep: ChloraPrep  Patient monitoring: blood pressure monitoring, continuous pulse oximetry and EKG  Procedure    Sedation: yes    Guidance:ultrasound guided    ULTRASOUND INTERPRETATION.  Using ultrasound guidance a 22 G gauge needle was placed in close proximity to the femoral nerve, at which point, under ultrasound guidance anesthetic was injected in the area of the nerve and spread of the anesthesia was seen on ultrasound in close proximity thereto.  There were no abnormalities seen on ultrasound; a digital image was taken; and the patient tolerated the procedure with no complications. Images:still images obtained    Laterality:left  Block Type:adductor canal block  Injection Technique:single-shot  Needle Type:echogenic  Needle Gauge:21 G      Medications Used: ropivacaine (NAROPIN) 0.5 % injection, 30 mL  Med administered at 11/1/2021 9:30 AM      Post Assessment  Injection Assessment: incremental injection, no paresthesia on injection and negative aspiration for heme (movement of needle, +heme, flush needle, reposition needle , no heme, reinject)  Patient Tolerance:comfortable throughout block  Complications:no

## 2021-11-01 NOTE — DISCHARGE PLACEMENT REQUEST
"Nayla Velasquez (66 y.o. Male)             Date of Birth Social Security Number Address Home Phone MRN    [id number]  [id number] [id number] [phone] [id number]    Jew Marital Status             Manjit        Admission Date Admission Type Admitting Provider Attending Provider Department, Room/Bed    11/1/21 Elective Jan Kruse MD Rhoads, David, MD [address], P795/1    Discharge Date Discharge Disposition Discharge Destination                         Attending Provider: Jan Kruse MD    Allergies: No Known Allergies    Isolation: None   Infection: None   Code Status: Not on file   Advance Care Planning Activity    Ht: 170.2 cm (67\")   Wt: 94.8 kg (208 lb 14.4 oz)    Admission Cmt: None   Principal Problem: Failed total knee, left, initial encounter (HCC) [T84.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]                 Active Insurance as of 11/1/2021     Primary Coverage     Payor Plan Insurance Group Employer/Plan Group    [employer] EMPLOYEE [id number]     Payor Plan Address Payor Plan Phone Number Payor Plan Fax Number Effective Dates    [address] [phone]  12/1/2020 - None Entered    [facility] [id number]       Subscriber Name Subscriber Birth Date Member ID       NAYLA VELASQUEZ 1955 [id number]                 Emergency Contacts      (Rel.) Home Phone Work Phone Mobile Phone    WILMANISH (Spouse) -- -- [phone]              "

## 2021-11-01 NOTE — OP NOTE
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 Item Serial No.  Lot No. LRB No. Used Action   DEV CONTRL TISS STRATAFIX SPIRAL MNCRYL UD 3/0 PLS 30CM - [id number] Implant DEV CONTRL TISS STRATAFIX SPIRAL MNCRYL UD 3/0 PLS 30CM  [employer]  DIV OF J AND J [id number] Left 1 Implanted   DEV CONTRL TISS STRATAFIX SYMM PDS PLUS [place] CT-1 45CM - [id number] Implant DEV CONTRL TISS STRATAFIX SYMM PDS PLUS [place]OR[place] CT-1 45CM  [employer]  DIV OF J AND J [id number] Left 1 Implanted   DEV CONTRL TISS STRATAFIX SYMM PDS PLUS [place] CT-1 45CM - [id number] Implant DEV CONTRL TISS STRATAFIX SYMM PDS PLUS [place]OR[place] CT-1 45CM  [employer]  DIV OF J AND J REMARQ Left 1 Implanted   CMT BONE PALACOS RPLSG W/GENT HI/VISC 1X40 - [id number] Implant CMT BONE PALACOS RPLSG W/GENT HI/VISC 1X40  [facility] [id number] Left 2 Implanted   ADAPT FEM SIGMA 5D - [id number] Implant ADAPT FEM SIGMA 5D  DEPUY [id number] Left 1 Implanted   ADAPT BOLT FEM/KN SIGMA OFFST PLS/MIN2 - [id number] Implant ADAPT BOLT FEM/KN SIGMA OFFST PLS/MIN2  DEPUY [id number] Left 1 Implanted   COMP FEM SIGMA NON POR TC3 SZ4 LT - [id number] Implant COMP FEM SIGMA NON POR TC3 SZ4 LT  DEPUY [id number] Left 1 Implanted   STEM FEM FLUT UNIV 53D33XA - [id number] Implant STEM FEM FLUT UNIV [id number]  DEPUY [id number] Left 1 Implanted   SLV FEM UNIV POR/DIST 31MM - [id number] Implant SLV FEM UNIV POR/DIST 31MM  DEPUY  [id number] Left 1 Implanted   PAT SIGMA 3PEG OVL 38MM STD - [id number] Implant PAT SIGMA 3PEG OVL 38MM STD  DEPUY [id number] Left 1 Implanted   INSRT TIB SIGMA RP TC3 SZ4 15MM - [id number] Implant INSRT TIB SIGMA RP TC3 SZ4 15MM  DEPUY [id number] Left 1 Implanted       Indication for Procedure:   The patient is a 66 y.o. male presents today for a total knee arthroplasty revision procedure because of failure of their total knee implants.  X-rays suggested femoral and patellar loosening.  The patient was educated in risks of surgery that could include possible risk of infection, deep venous thrombosis, pulmonary embolism, fracture, neurovascular injury, leg length discrepancy, dislocation, possible persistent pain, need for additional surgeries, anesthetic risks, medical risks including heart attack and stroke, and death.  The discussion occurred in the office pre-operatively, and patient had the opportunity to ask questions, and concerns about the proposed surgery.  The patient also understood that medicine is not an exact science, and that outcomes of the surgical procedure may be less than desired. The patient wished to proceed.       Protocols for intravenous antibiotics and venous thrombosis were followed for this patient.  IV antibiotics were infused prior to surgery and will be discontinued within 24 hours of completion of the surgical procedure.  Thrombosis prophylaxis will be initiated within 24 hours of the completion of the surgical procedure.       Procedure:              After the patient was identified in the preoperative area, and the surgical site confirmed and marked, the patient was brought to the operating room on a stretcher.  They were placed supine on the operating room table and the above anesthetic was placed uneventfully.  A time-out procedure was performed.  The intravenous ancef antibiotics infusion was completed.  A non-sterile tourniquet was placed on the operative thigh, and was prepped and draped  in the usual sterile fashion.  An Esmarch was to exsanguinate the limb, and the tourniquet was inflated.                A 10 blade scalpel was used to make a longitudinal incision from above the patella to medial to the tibial tubercle.  A medial chrissy-patellar arthrotomy was performed with another 10 blade scalpel.  The synovial fluid was clear, and the medial joint line was elevated subperiosteally with electrocautery and a clark elevator.  The patellar fat pad and scar tissue was removed.  The patella was found to be loose so it was removed with oscillating saw. The knee was well balanced in flexion and extension.  The polyethylene component could not be removed with subluxation of the joint, so it was cut in 1/2 with the recripicating saw and then removed.   The patella was prepared with 35 mm patella and redrilled.  The tibial implants were found to be stable.  There was mild loosening under the tray, but the sleeve was felt to be stable on x-ray.  The femoral component was pradeep loose and was easily removed using osteotomes and mallet and a bone tamp.  There was substantial bone loss on the medial and femoral condyles with fibrous tissue under neath the implants suggesting chronic loosening.  Then the femoral canal was opened using intramedullary reamer.  Then the femoral canal was prepared for a metaphyseal sleeve. Then trial femoral implant was implanted and the knee was ranged.  It was found to be stable in all planes.                The trial components were then removed and the final implants were confirmed and opened.  The bone surfaces were then irrigated and dried. Palacos cement was then mixed and placed on the cut bone surfaces and back side of the implants.  The implants were then impacted into place, and the final polyethylene spacer was placed and the knee was placed into extension.  A stack of towels was placed under the ankle and the cement was allowed to harden. The tourniquet was then dropped.   Hemostasis was obtained.  The wound was then irrigated with the pulse lavage irrigation system with a 3 liter mixture of betadine and normal saline.  The local mixture was injected throughout the knee for post-operative pain control.  The knee was flexed to 90 degrees and the arthrotomy was closed with #1 Strata-fix suture.  The deep dermis was closed with #1 strata-fix, and the skin was closed with 3-0 Monocryl suture.  Skin glue was applied and sterile dressing were applied.              Sponge and needle counts were completed and were correct.  The patient was awaken from anesthetic and was returned to recovery in stable condition.    Jan Kruse MD  Date: 11/1/2021    Time: 14:15 EDT

## 2021-11-01 NOTE — ANESTHESIA PROCEDURE NOTES
Airway  Urgency: elective    Date/Time: 11/1/2021 12:17 PM  Airway not difficult    General Information and Staff    Patient location during procedure: OR  Anesthesiologist: Sotero Cruz MD    Indications and Patient Condition  Indications for airway management: airway protection    Preoxygenated: yes  Mask difficulty assessment: 3 - difficult mask (inadequate, unstable or two providers) +/- NMBA    Final Airway Details  Final airway type: endotracheal airway      Successful airway: ETT  Cuffed: yes   Successful intubation technique: direct laryngoscopy  Endotracheal tube insertion site: oral  Blade: Renee  Blade size: 3  ETT size (mm): 7.5  Cormack-Lehane Classification: grade I - full view of glottis  Placement verified by: chest auscultation and capnometry   Measured from: lips  ETT/EBT  to lips (cm): 23  Number of attempts at approach: 1  Assessment: lips, teeth, and gum same as pre-op and atraumatic intubation

## 2021-11-01 NOTE — CASE MANAGEMENT/SOCIAL WORK
Discharge Planning Assessment  [facility]     Patient Name: Corey Velasquez  MRN: [id number]  Today's Date: 11/1/2021    Admit Date: 11/1/2021     Discharge Needs Assessment     Row Name 11/01/21 1711       Living Environment    Lives With spouse    Name(s) of Who Lives With Patient Wife/Rosana.    Current Living Arrangements home/apartment/condo    Primary Care Provided by self    Provides Primary Care For no one    Family Caregiver if Needed spouse    Quality of Family Relationships helpful; involved; supportive    Able to Return to Prior Arrangements yes       Resource/Environmental Concerns    Transportation Concerns car, none       Transition Planning    Patient/Family Anticipates Transition to home with family; home with help/services    Patient/Family Anticipated Services at Transition     Transportation Anticipated family or friend will provide       Discharge Needs Assessment    Readmission Within the Last 30 Days no previous admission in last 30 days    Equipment Currently Used at Home cane, straight; walker, rolling; shower chair    Discharge Facility/Level of Care Needs home with home health    Provided Post Acute Provider List? N/A    Patient's Choice of Community Agency(s) [facility] .               Discharge Plan     Row Name 11/01/21 1712       Plan    Plan Home with family support & [facility] .    Patient/Family in Agreement with Plan yes    Plan Comments Spoke with the patient and his wife/Rosana, verified current information and explained the role of the CCP. Patient lives with Rosana and has family support. He's IADL and has a cane, walker and shower chair. He's been to [facility] and has worked with [facility]  in the past. Patient plans to d/c home with family support & [facility] HH. Referral sent in Epic. Rosana said she plans to transport the patient home at d/c. No other needs identified.              Continued Care and Services - Admitted Since 11/1/2021     Home Medical Care     Service Provider Request  Status Selected Services Address Phone Fax Patient [facility]    [facility] - Request Sent N/A [id number] [address] [phone] --                 Demographic Summary     Row Name 11/01/21 1711       General Information    Admission Type observation    Reason for Consult discharge planning    Preferred Language English     Used During This Interaction no       Contact Information    Permission Granted to Share Info With ; family/designee               Functional Status     Row Name 11/01/21 1711       Functional Status    Usual Activity Tolerance good       Functional Status, IADL    Medications independent    Meal Preparation assistive equipment    Housekeeping assistive equipment    Laundry assistive equipment    Shopping assistive equipment       Mental Status Summary    Recent Changes in Mental Status/Cognitive Functioning no changes               Psychosocial     Row Name 11/01/21 1711       Intellectual Performance WDL    Level of Consciousness Alert       Coping/Stress    Patient Personal Strengths able to adapt    Sources of Support spouse    Reaction to Health Status accepting    Understanding of Condition and Treatment adequate understanding of medical condition       Developmental Stage (Eriksson's)    Developmental Stage Stage 7 (35-65 years/Middle Adulthood) Generativity vs. Stagnation               Abuse/Neglect    No documentation.                Legal    No documentation.                Substance Abuse    No documentation.                Patient Forms    No documentation.                   Sayra Dasilva RN

## 2021-11-01 NOTE — ANESTHESIA PREPROCEDURE EVALUATION
Anesthesia Evaluation     Patient summary reviewed   NPO Solid Status: > 8 hours             Airway   Mallampati: II  Neck ROM: full  No difficulty expected  Dental      Pulmonary    Cardiovascular     Rhythm: regular    (+) hypertension,       Neuro/Psych  (+) psychiatric history ADD,     GI/Hepatic/Renal/Endo      Musculoskeletal     Abdominal    Substance History      OB/GYN          Other   arthritis,                      Anesthesia Plan    ASA 3     general with block       Anesthetic plan, all risks, benefits, and alternatives have been provided, discussed and informed consent has been obtained with: patient.  Use of blood products discussed with patient .

## 2021-11-01 NOTE — ANESTHESIA POSTPROCEDURE EVALUATION
"Patient: Corey Velasquez    Procedure Summary     Date: 11/01/21 Room / Location: [facility] OR [address] [place] [address] MAIN OR    Anesthesia Start: 1207 Anesthesia Stop: 1447    Procedure: LEFT TOTAL KNEE  REVISION (Left Knee) Diagnosis:     Surgeons: Jan Kruse MD Provider: Sotero Cruz MD    Anesthesia Type: general with block ASA Status: 3          Anesthesia Type: general with block    Vitals  Vitals Value Taken Time   /89 11/01/21 1458   Temp     Pulse 98 11/01/21 1507   Resp     SpO2 97 % 11/01/21 1507   Vitals shown include unvalidated device data.        Post Anesthesia Care and Evaluation    Patient location during evaluation: bedside  Patient participation: complete - patient participated  Level of consciousness: sleepy but conscious  Pain score: 0  Pain management: adequate  Airway patency: patent  Anesthetic complications: No anesthetic complications    Cardiovascular status: acceptable  Respiratory status: acceptable  Hydration status: acceptable    Comments: /86 (BP Location: Right arm)   Pulse 64   Temp 37 °C (98.6 °F) (Oral)   Resp 18   Ht 170.2 cm (67\")   Wt 94.8 kg (208 lb 14.4 oz)   SpO2 98%   BMI 32.72 kg/m²         "

## 2021-11-01 NOTE — PLAN OF CARE
Goal Outcome Evaluation:              Outcome Summary: pod 0, left knee revision, ambulated to bed from stretcher, pain & nausea meds this afternoon, poor appetite, encouraged oral fluid intake, IV fluids continue DTV at time of writing, ambulated in bright & up to chair early evening.

## 2021-11-01 NOTE — H&P
ORTHOPEDIC SURGERY PRE-OP HISTORY AND PHYSICAL      Patient: Corey Velasquez  Date of Admission: 11/1/2021  7:14 AM  YOB: 1955  Medical Record Number: [id number]  Attending Physician: Jan Kruse MD  Consulting Physician: Jan Kruse MD    CHIEF COMPLAINT: Left Knee Pain.    HISTORY OF PRESENT ILLINESS: Patient is a 66 y.o. male presents to [facility] with above complaints.  The patient failed conservative treatment and patient requested surgical intervention.  The patient presents for a  Left total knee revision.    ALLERGIES: No Known Allergies    HOME MEDICATIONS:  Medications Prior to Admission   Medication Sig Dispense Refill Last Dose   • Adderall XR 30 MG 24 hr capsule Take 1 capsule by mouth Every Morning 30 capsule 0 Past Week at Unknown time   • Chlorhexidine Gluconate 2 % pads Apply  topically. AS DIRECTED PAT   11/1/2021 at 0600   • FLUoxetine (PROzac) 40 MG capsule TAKE ONE CAPSULE BY MOUTH DAILY (Patient taking differently: Take 40 mg by mouth Daily.) 90 capsule 1 11/1/2021 at 0600   • metoprolol succinate XL (TOPROL-XL) 25 MG 24 hr tablet TAKE ONE TABLET BY MOUTH DAILY (Patient taking differently: Take 25 mg by mouth Daily.) 90 tablet 2 11/1/2021 at 0600   • mupirocin (BACTROBAN) 2 % ointment Apply 1 application topically to the appropriate area as directed 3 (Three) Times a Day. AS DIRECTED PAT   11/1/2021 at 0600       Past Medical History:   Diagnosis Date   • ADD (attention deficit disorder)     PREDOMINANTLY INATTENTIVE TYPE   • Anemia, unspecified    • Anesthesia complication     DIFFICULT TO AROUSE AFTER GALLBLADDER SURGERY 10-12 YEARS AGO, HAS HAD SURGERY SINCE WITHOUT DIFFICULTY   • Cough    • Depression     Major depressive disorder, single episode, mild   • Essential (primary) hypertension    • Knee pain, left    • Limited mobility     LEFT KNEE   • Other hydrocele    • Tachycardia, unspecified    • Unilateral primary osteoarthritis, left knee    •  Weakness     LEFT KNEE     Past Surgical History:   Procedure Laterality Date   • CATARACT EXTRACTION, BILATERAL     • CHOLECYSTECTOMY  2012   • COLONOSCOPY      ; NEG   • ENDOSCOPY      GASTRITIS   • JOINT REPLACEMENT Left 2016    TKA   • ORTHOPEDIC SURGERY Right     HAND   • REVISION TOTAL KNEE ARTHROPLASTY Left    • TONSILLECTOMY AND ADENOIDECTOMY       Social History     Occupational History   • Occupation: [employer]   Tobacco Use   • Smoking status: Former Smoker     Packs/day: 2.00     Years: 30.00     Pack years: 60.00     Types: Cigarettes     Quit date: 1/15/2012     Years since quittin.8   • Smokeless tobacco: Never Used   Vaping Use   • Vaping Use: Never used   Substance and Sexual Activity   • Alcohol use: Not Currently   • Drug use: Defer   • Sexual activity: Defer      Social History     Social History Narrative   • Not on file     Family History   Problem Relation Age of Onset   • Pneumonia Mother    • Lymphoma Mother    • Heart attack Father    • Other Father         BRAIN TUMOR   • Malig Hyperthermia Neg Hx        REVIEW OF SYSTEMS:    HEENT: Patient denies any headaches, vision changes, change in hearing, or tinnitus, Patient denies epistaxis, sinus pain, hoarseness, or dysphagia   Pulmonary: Patient denies any cough, congestion, acute change in SOA or wheezing.   Cardiovascular: Patient denies any change in chest pain, dyspnea, palpitations, weakness, intolerance of exercise, varicosities, change in murmur   Gastrointestinal:  Patient denies change in appetite, melena, change in bowel habits.   Genital/Urinary: Patient denies dysuria, change in color of urine, change in frequency of urination, pain with urgency, change in incontinence, retention.   Musculoskeletal: Patient denies complaints of acute changes in symptoms of other joints not mentioned above.   Neurological: Patient denies changes in dizziness, tremor, ataxia, or difficulty in speaking or changes in  "memory.   Endocrine system: Patient denies acute changes in tremors, palpitations, polyuria, polydipsia, polyphagia, diaphoresis, exophthalmos, or goiter.   Psychological: Patient denies thoughts/plans of harming self or other; denies acute changes in depression,  insomnia, night terrors, karmen, disorientation.   Skin: Patient denies any bruising, rashes, discoloration, pruritus,or wounds not mentioned in history of present illness or chief complaint above.   Hematopoietic: Patient denies current bleeding, epistaxis, hematuria, or melena.    PHYSICAL EXAM:   Vitals:  Vitals:    11/01/21 0824   BP: 149/90   BP Location: Right arm   Patient Position: Lying   Pulse: 64   Resp: 18   Temp: 98.6 °F (37 °C)   TempSrc: Oral   SpO2: 100%   Weight: 94.8 kg (208 lb 14.4 oz)   Height: 170.2 cm (67\")       General:  66 y.o. male who appears about stated age.    Alert, cooperative, in no acute distress                       Head:    Normocephalic, without obvious abnormality, atraumatic   Eyes:            Lids and lashes normal, conjunctivae and sclerae normal, no         icterus, no pallor, corneas clear, PERRLA   Ears:    Ears appear intact with no abnormalities noted   Throat:   No oral lesions, no thrush, oral mucosa moist   Neck:   No adenopathy, supple, trachea midline, no JVD   Back:     Limited exam shows no severe kyphosis present,no visible           erythema, no excessive  tenderness to palpation.    Lungs:     Respirations regular, even and unlabored.     Heart:    Normal rate, Pulses palpable   Chest Wall:    No abnormalities observed.   Abdomen:     Normal bowel sounds, no masses, no organomegaly, soft              non-tender, non-distended, no guarding, no rebound                      tenderness   Rectal:     Deferred   Pulses:   Pulses palpable and equal bilaterally   Skin:   No bleeding, bruising or rash   Lymph nodes:   No palpable adenopathy   Extremities:     Left Knee: Skin intact.  Well healed incisions. " Painful ROM.  NVI distally.      DIAGNOSTIC TEST:  No visits with results within 2 Day(s) from this visit.   Latest known visit with results is:   Lab on 10/29/2021   Component Date Value Ref Range Status   • COVID19 10/29/2021 Not Detected  Not Detected - Ref. Range Final       No results found.      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  Patient Active Problem List   Diagnosis   • Hypertension, essential   • Attention deficit disorder (ADD) without hyperactivity   • High risk medication use   • Mild episode of recurrent major depressive disorder (HCC)   • Generalized osteoarthritis   • Chronic pain of left knee   • Annual physical exam   • Encounter for long-term (current) use of medications   • Failed total knee, left, initial encounter ([facility])       PLAN:    Left TKA revision.    Risks and benefits of surgical intervention were discussed in detail with the patient.  Risks of infection, fracture, dislocation, extremity length discrepancy, neurovascular injury, persistent pain, medical risks, anesthetic risk, need for additional surgery, deep venous thrombosis, pulmonary embolism and death.      The above diagnosis and treatment plan was discussed with the patient and/or family.  They were educated in both non-surgical and surgical treatment options for their condition.   They were given the opportunity to ask questions and were answered to their satisfaction.  They agreed to proceed with the above treatment plan.        Jan Kruse MD  Date: 11/1/2021

## 2021-11-02 ENCOUNTER — READMISSION MANAGEMENT (OUTPATIENT)
Dept: CALL CENTER | Facility: HOSPITAL | Age: 66
End: 2021-11-02

## 2021-11-02 VITALS
HEART RATE: 87 BPM | DIASTOLIC BLOOD PRESSURE: 72 MMHG | TEMPERATURE: 97.8 F | SYSTOLIC BLOOD PRESSURE: 125 MMHG | BODY MASS INDEX: 32.79 KG/M2 | WEIGHT: 208.9 LBS | HEIGHT: 67 IN | OXYGEN SATURATION: 97 % | RESPIRATION RATE: 16 BRPM

## 2021-11-02 LAB
ANION GAP SERPL CALCULATED.3IONS-SCNC: 9.9 MMOL/L (ref 5–15)
BUN SERPL-MCNC: 21 MG/DL (ref 8–23)
BUN/CREAT SERPL: 20.6 (ref 7–25)
CALCIUM SPEC-SCNC: 8.5 MG/DL (ref 8.6–10.5)
CHLORIDE SERPL-SCNC: 98 MMOL/L (ref 98–107)
CO2 SERPL-SCNC: 24.1 MMOL/L (ref 22–29)
CREAT SERPL-MCNC: 1.02 MG/DL (ref 0.76–1.27)
GFR SERPL CREATININE-BSD FRML MDRD: 74 ML/MIN/1.73
GLUCOSE SERPL-MCNC: 131 MG/DL (ref 65–99)
HCT VFR BLD AUTO: 31.6 % (ref 37.5–51)
HGB BLD-MCNC: 11 G/DL (ref 13–17.7)
POTASSIUM SERPL-SCNC: 5.4 MMOL/L (ref 3.5–5.2)
SODIUM SERPL-SCNC: 132 MMOL/L (ref 136–145)

## 2021-11-02 PROCEDURE — G0378 HOSPITAL OBSERVATION PER HR: HCPCS

## 2021-11-02 PROCEDURE — 0 CEFAZOLIN IN DEXTROSE 2-4 GM/100ML-% SOLUTION: Performed by: ORTHOPAEDIC SURGERY

## 2021-11-02 PROCEDURE — 63710000001 PROMETHAZINE PER 12.5 MG: Performed by: ORTHOPAEDIC SURGERY

## 2021-11-02 PROCEDURE — 97530 THERAPEUTIC ACTIVITIES: CPT

## 2021-11-02 PROCEDURE — 85014 HEMATOCRIT: CPT | Performed by: ORTHOPAEDIC SURGERY

## 2021-11-02 PROCEDURE — 97116 GAIT TRAINING THERAPY: CPT

## 2021-11-02 PROCEDURE — 97110 THERAPEUTIC EXERCISES: CPT

## 2021-11-02 PROCEDURE — 97162 PT EVAL MOD COMPLEX 30 MIN: CPT

## 2021-11-02 PROCEDURE — 25010000002 ONDANSETRON PER 1 MG: Performed by: ORTHOPAEDIC SURGERY

## 2021-11-02 PROCEDURE — 85018 HEMOGLOBIN: CPT | Performed by: ORTHOPAEDIC SURGERY

## 2021-11-02 PROCEDURE — 80048 BASIC METABOLIC PNL TOTAL CA: CPT | Performed by: ORTHOPAEDIC SURGERY

## 2021-11-02 RX ORDER — TAMSULOSIN HYDROCHLORIDE 0.4 MG/1
0.4 CAPSULE ORAL DAILY
Status: DISCONTINUED | OUTPATIENT
Start: 2021-11-02 | End: 2021-11-02 | Stop reason: HOSPADM

## 2021-11-02 RX ORDER — HYDROCODONE BITARTRATE AND ACETAMINOPHEN 10; 325 MG/1; MG/1
2 TABLET ORAL EVERY 4 HOURS PRN
Status: DISCONTINUED | OUTPATIENT
Start: 2021-11-02 | End: 2021-11-02 | Stop reason: HOSPADM

## 2021-11-02 RX ORDER — TAMSULOSIN HYDROCHLORIDE 0.4 MG/1
0.4 CAPSULE ORAL DAILY
Qty: 30 CAPSULE | Refills: 0 | Status: SHIPPED | OUTPATIENT
Start: 2021-11-02 | End: 2021-11-23

## 2021-11-02 RX ORDER — HYDROCODONE BITARTRATE AND ACETAMINOPHEN 10; 325 MG/1; MG/1
1 TABLET ORAL EVERY 4 HOURS PRN
Status: DISCONTINUED | OUTPATIENT
Start: 2021-11-02 | End: 2021-11-02 | Stop reason: HOSPADM

## 2021-11-02 RX ORDER — PROMETHAZINE HYDROCHLORIDE 12.5 MG/1
12.5 TABLET ORAL EVERY 6 HOURS PRN
Status: DISCONTINUED | OUTPATIENT
Start: 2021-11-02 | End: 2021-11-02 | Stop reason: HOSPADM

## 2021-11-02 RX ORDER — DOCUSATE SODIUM 100 MG/1
200 CAPSULE, LIQUID FILLED ORAL 2 TIMES DAILY
Qty: 90 CAPSULE | Refills: 1 | Status: SHIPPED | OUTPATIENT
Start: 2021-11-02 | End: 2021-11-23

## 2021-11-02 RX ORDER — ASPIRIN 81 MG/1
81 TABLET ORAL EVERY 12 HOURS SCHEDULED
Qty: 60 TABLET | Refills: 0 | Status: SHIPPED | OUTPATIENT
Start: 2021-11-02 | End: 2022-01-12

## 2021-11-02 RX ORDER — ONDANSETRON 4 MG/1
4 TABLET, FILM COATED ORAL EVERY 6 HOURS PRN
Qty: 20 TABLET | Refills: 0 | Status: SHIPPED | OUTPATIENT
Start: 2021-11-02 | End: 2021-11-23

## 2021-11-02 RX ORDER — HYDROCODONE BITARTRATE AND ACETAMINOPHEN 10; 325 MG/1; MG/1
1-2 TABLET ORAL EVERY 4 HOURS PRN
Qty: 56 TABLET | Refills: 0 | Status: SHIPPED | OUTPATIENT
Start: 2021-11-02 | End: 2021-11-23

## 2021-11-02 RX ADMIN — SODIUM CHLORIDE, POTASSIUM CHLORIDE, SODIUM LACTATE AND CALCIUM CHLORIDE 100 ML/HR: 600; 310; 30; 20 INJECTION, SOLUTION INTRAVENOUS at 13:52

## 2021-11-02 RX ADMIN — FAMOTIDINE 40 MG: 20 TABLET, FILM COATED ORAL at 09:23

## 2021-11-02 RX ADMIN — DOCUSATE SODIUM 200 MG: 100 CAPSULE, LIQUID FILLED ORAL at 10:25

## 2021-11-02 RX ADMIN — ASPIRIN 81 MG: 81 TABLET, COATED ORAL at 10:25

## 2021-11-02 RX ADMIN — FLUOXETINE HYDROCHLORIDE 40 MG: 20 CAPSULE ORAL at 10:25

## 2021-11-02 RX ADMIN — TAMSULOSIN HYDROCHLORIDE 0.4 MG: 0.4 CAPSULE ORAL at 09:23

## 2021-11-02 RX ADMIN — ONDANSETRON 4 MG: 2 INJECTION INTRAMUSCULAR; INTRAVENOUS at 08:14

## 2021-11-02 RX ADMIN — SODIUM CHLORIDE, POTASSIUM CHLORIDE, SODIUM LACTATE AND CALCIUM CHLORIDE 100 ML/HR: 600; 310; 30; 20 INJECTION, SOLUTION INTRAVENOUS at 00:15

## 2021-11-02 RX ADMIN — PROMETHAZINE HYDROCHLORIDE 12.5 MG: 12.5 TABLET ORAL at 13:44

## 2021-11-02 RX ADMIN — CEFAZOLIN SODIUM 2 G: 2 INJECTION, SOLUTION INTRAVENOUS at 04:28

## 2021-11-02 RX ADMIN — HYDROCODONE BITARTRATE AND ACETAMINOPHEN 1 TABLET: 10; 325 TABLET ORAL at 16:37

## 2021-11-02 NOTE — PROGRESS NOTES
Procedure(s):  LEFT TOTAL KNEE  REVISION     LOS: 0 days     Subjective :   Patient seen and examined.  He is resting comfortably in his hospital bed.  He is easily arousable, alert and oriented, and responds appropriately.  Reports that his pain is well controlled with current medication.  He does relate that he had some issues with urinary retention last night but states that these are improved.  No CP, SoA, calf pain / calf swelling    Objective :    Vital signs in last 24 hours:  Vitals:    11/01/21 1540 11/01/21 1945 11/01/21 2251 11/02/21 0359   BP: 117/86 99/69 120/83 126/77   BP Location: Left arm Left arm Left arm Left arm   Patient Position: Lying Lying Lying Lying   Pulse: 93 64 66 95   Resp: 16 16 16 16   Temp: 98 °F (36.7 °C) 96.8 °F (36 °C) 96.9 °F (36.1 °C) 97 °F (36.1 °C)   TempSrc: Oral Skin Skin Skin   SpO2: 97% 97% 97% 92%   Weight:       Height:           PHYSICAL EXAM:  Patient is calm, in no acute distress, awake and oriented x 3.  Dressing is clean, dry and intact.  No signs of infection.  Swelling is appropriate in amount.  Ecchymosis is appropriate in amount.  EHL, FHL, TA, GS intact.  Patient is neurovascularly intact distally.    LABS:  Results from last 7 days   Lab Units 11/01/21 1929   HEMOGLOBIN g/dL 11.9*   HEMATOCRIT % 34.4*     XR Knee 1 or 2 View Left    Result Date: 11/1/2021   Postsurgical changes.    This report was finalized on 11/1/2021 3:17 PM by Dr. George Thompson M.D.          ASSESSMENT:  Status post Procedure(s):  LEFT TOTAL KNEE  REVISION     Plan:  Continue Physical Therapy, increase mobility and range of motion as tolerated.  Continue SCDs, Continue DVT prophylaxis.  Aspirin 81 mg BID after discharge.  Dispo planning for home with home health, if meeting PT goals and able to void, likely today.      Bhupendra Perkins, DI    Date: 11/2/2021  Time: 06:19 EDT

## 2021-11-02 NOTE — PLAN OF CARE
Goal Outcome Evaluation:Pt is having urinary retention. Straight cath at 0020 with output of 500ml. Pt is bladder scanned again at 0530 and only retaining 213ml.   Pt is dependent on oxygen at the moment. Pt did not tolerate attempts to wean him off.

## 2021-11-02 NOTE — CASE MANAGEMENT/SOCIAL WORK
Continued Stay Note  [facility]     Patient Name: Corey Velasquez  MRN: [id number]  Today's Date: 11/2/2021    Admit Date: 11/1/2021     Discharge Plan     Row Name 11/02/21 0937       Plan    Plan Home with family support & A .    Patient/Family in Agreement with Plan yes    Plan Comments Received a call from Francoise/[facility] who said they declined the pt in error and can accept. Discussed with Hansa/[facility] who's agreeable. Updated the pt who's agreeable.    Row Name 11/02/21 0914       Plan    Plan Home with family support & [facility].    Patient/Family in Agreement with Plan yes    Plan Comments [facility] is unable to accept at this time due to staffing. Updated the patient and his wife/Rosana who are agreeable and request [facility]. Referral sent in Epic.               Discharge Codes    No documentation.               Expected Discharge Date and Time     Expected Discharge Date Expected Discharge Time    Nov 2, 2021             Sayra Dasilva RN

## 2021-11-02 NOTE — PROGRESS NOTES
[facility] Progress Note  11/2/2021  13:34 EDT     Urology Consult Pending     Urinary Retention    Plan  - Advised nursing to place Espinoza catheter  - I'll be by soon to discuss with patient       Vasu Lei MD  [facility]  General & Reconstructive Urology  Office: [phone]  Fax: [phone]

## 2021-11-02 NOTE — THERAPY EVALUATION
Patient Name: Corey Velasquez  : 1955    MRN: [id number]                              Today's Date: 2021       Admit Date: 2021    Visit Dx:     ICD-10-CM ICD-9-CM   1. Failed total knee, left, initial encounter (CMS/[facility])  T84.093A 996.47     V43.65     Patient Active Problem List   Diagnosis   • Hypertension, essential   • Attention deficit disorder (ADD) without hyperactivity   • High risk medication use   • Mild episode of recurrent major depressive disorder (HCC)   • Generalized osteoarthritis   • Chronic pain of left knee   • Annual physical exam   • Encounter for long-term (current) use of medications   • Failed total knee, left, initial encounter ([facility])     Past Medical History:   Diagnosis Date   • ADD (attention deficit disorder)     PREDOMINANTLY INATTENTIVE TYPE   • Anemia, unspecified    • Anesthesia complication     DIFFICULT TO AROUSE AFTER GALLBLADDER SURGERY 10-12 YEARS AGO, HAS HAD SURGERY SINCE WITHOUT DIFFICULTY   • Cough    • Depression     Major depressive disorder, single episode, mild   • Essential (primary) hypertension    • Knee pain, left    • Limited mobility     LEFT KNEE   • Other hydrocele    • Tachycardia, unspecified    • Unilateral primary osteoarthritis, left knee    • Weakness     LEFT KNEE     Past Surgical History:   Procedure Laterality Date   • CATARACT EXTRACTION, BILATERAL     • CHOLECYSTECTOMY     • COLONOSCOPY      ; NEG   • ENDOSCOPY      GASTRITIS   • JOINT REPLACEMENT Left 2016    TKA   • ORTHOPEDIC SURGERY Right     HAND   • REVISION TOTAL KNEE ARTHROPLASTY Left    • TONSILLECTOMY AND ADENOIDECTOMY     • TOTAL KNEE ARTHROPLASTY REVISION Left 2021    Procedure: LEFT TOTAL KNEE  REVISION;  Surgeon: Jan Kruse MD;  Location: [facility];  Service: Orthopedics;  Laterality: Left;      General Information     Row Name 21 1532          Physical Therapy Time and Intention    Document Type evaluation  -SR     Mode of Treatment  physical therapy; individual therapy  -SR     Row Name 11/02/21 1532          General Information    Patient Profile Reviewed yes  -SR     Prior Level of Function independent:; gait  -SR     Existing Precautions/Restrictions fall  -SR     Barriers to Rehab none identified  -SR     Row Name 11/02/21 1532          Living Environment    Lives With spouse  -SR     Row Name 11/02/21 1532          Home Main Entrance    Number of Stairs, Main Entrance two  -SR     Stair Railings, Main Entrance railing on left side (ascending)  -SR     Row Name 11/02/21 1532          Stairs Within Home, Primary    Stairs, Within Home, Primary lives on main level  -SR     Row Name 11/02/21 1532          Cognition    Orientation Status (Cognition) oriented x 4  -SR     Row Name 11/02/21 1532          Safety Issues, Functional Mobility    Impairments Affecting Function (Mobility) balance; endurance/activity tolerance; strength; pain  -SR     Comment, Safety Issues/Impairments (Mobility) gait belt and non slip socks for safety  -SR           User Key  (r) = Recorded By, (t) = Taken By, (c) = Cosigned By    Initials Name Provider Type    SR Genevieve Oliver Physical Therapist               Mobility     Row Name 11/02/21 1533          Bed Mobility    Bed Mobility supine-sit  -SR     Supine-Sit [place] (Bed Mobility) standby assist  -SR     Assistive Device (Bed Mobility) head of bed elevated  -SR     Comment (Bed Mobility) required additional time to complete slow moving LLE  -SR     Row Name 11/02/21 1533          Transfers    Comment (Transfers) sit <> stand with RW CGA with cues for hand placement during transition  -SR     Row Name 11/02/21 1533          Sit-Stand Transfer    Sit-Stand [place] (Transfers) contact guard; 1 person assist; verbal cues  -SR     Assistive Device (Sit-Stand Transfers) walker, front-wheeled  -SR     Row Name 11/02/21 1533          Gait/Stairs (Locomotion)    [place] Level (Gait) contact guard; 1 person  assist; verbal cues  -SR     Assistive Device (Gait) walker, front-wheeled  -SR     Distance in Feet (Gait) 2 x 80ft  -SR     Deviations/Abnormal Patterns (Gait) gait speed decreased; yenni decreased; antalgic  -SR     Left Sided Gait Deviations weight shift ability decreased  -SR     [place] Level (Stairs) contact guard; 1 person assist; verbal cues  -SR     Handrail Location (Stairs) left side (ascending)  -SR     Number of Steps (Stairs) 3  -SR     Ascending Technique (Stairs) step-to-step  -SR     Descending Technique (Stairs) step-to-step  -SR     Comment (Gait/Stairs) Pt ambulated 2 x 80ft with RW CGA initially with step to gait pattern requiring cues for increased stride length RLE, no LOB. Pt ascended/descended 3 steps with L rail CGA with cues for safe step to sequencing, no LOB  -SR     Row Name 11/02/21 1533          Mobility    Extremity Weight-bearing Status left lower extremity  -SR     Left Lower Extremity (Weight-bearing Status) weight-bearing as tolerated (WBAT)  -SR           User Key  (r) = Recorded By, (t) = Taken By, (c) = Cosigned By    Initials Name Provider Type    SR Genevieve Oliver Physical Therapist               Obj/Interventions     Row Name 11/02/21 1535          Range of Motion Comprehensive    General Range of Motion lower extremity range of motion deficits identified  -SR     Comment, General Range of Motion L knee PROM completes 50% WFL; RLE WFL  -SR     Row Name 11/02/21 1535          Strength Comprehensive (MMT)    General Manual Muscle Testing (MMT) Assessment lower extremity strength deficits identified  -SR     Comment, General Manual Muscle Testing (MMT) Assessment LLE grossly 3+/5; RLE 4+/5  -SR     Row Name 11/02/21 1535          Motor Skills    Therapeutic Exercise --  5 x TKA protocol with AAROM heel slides, LAQ  -SR     Row Name 11/02/21 1535          Balance    Balance Assessment sitting static balance; sitting dynamic balance; standing static balance; standing  dynamic balance  -SR     Static Sitting Balance WFL; sitting, edge of bed  -SR     Dynamic Sitting Balance WFL; sitting, edge of bed  -SR     Static Standing Balance WFL; supported  -SR     Dynamic Standing Balance mild impairment; supported  -SR     Comment, Balance CGA with RW for safety  -SR     Row Name 11/02/21 1535          Sensory Assessment (Somatosensory)    Sensory Assessment (Somatosensory) sensation intact  -SR           User Key  (r) = Recorded By, (t) = Taken By, (c) = Cosigned By    Initials Name Provider Type    SR Genevieve Oliver Physical Therapist               Goals/Plan     Row Name 11/02/21 1541          Bed Mobility Goal 1 (PT)    Activity/Assistive Device (Bed Mobility Goal 1, PT) bed mobility activities, all  -SR     [place] Level/Cues Needed (Bed Mobility Goal 1, PT) independent  -SR     Time Frame (Bed Mobility Goal 1, PT) 1 week  -SR     Row Name 11/02/21 1541          Transfer Goal 1 (PT)    Activity/Assistive Device (Transfer Goal 1, PT) transfers, all  -SR     [place] Level/Cues Needed (Transfer Goal 1, PT) standby assist  -SR     Time Frame (Transfer Goal 1, PT) 1 week  -SR     Row Name 11/02/21 1541          Gait Training Goal 1 (PT)    Activity/Assistive Device (Gait Training Goal 1, PT) gait (walking locomotion)  -SR     [place] Level (Gait Training Goal 1, PT) standby assist; contact guard assist  -SR     Distance (Gait Training Goal 1, PT) 200ft  -SR     Time Frame (Gait Training Goal 1, PT) 1 week  -SR           User Key  (r) = Recorded By, (t) = Taken By, (c) = Cosigned By    Initials Name Provider Type    SR Genevieve Oliver Physical Therapist               Clinical Impression     Row Name 11/02/21 1537          Pain    Additional Documentation Pain Scale: Numbers Pre/Post-Treatment (Group)  -SR     Row Name 11/02/21 1537          Pain Scale: Numbers Pre/Post-Treatment    Pretreatment Pain Rating 10/10  -SR     Posttreatment Pain Rating 10/10  -SR     Pain Location -  Side Left  -SR     Pain Location - Orientation lower  -SR     Pain Location extremity  -SR     Pre/Posttreatment Pain Comment notified RN  -SR     Pain Intervention(s) Ambulation/increased activity; Repositioned; Cold applied  -SR     Row Name 11/02/21 1537          Plan of Care Review    Plan of Care Reviewed With patient; spouse  -SR     Progress no change  -SR     Outcome Summary Pt is a 67 yo male s/p revision L TKA 11/1/21. Pt reports he lives with wife in house with 2 ELIF L rail, was not useing DME and was independent with mobility prior to admission. Pt reports he has a walker, commode chair, and shower chair at home. Today pt completed 5 reps TKA exercises, ambulated 2 x 80ft with RW and ascended/descended 3 steps with assist x 1 demonstrating impaired strength, balance, gait and activity tolerance. Pt could benefit from skilled PT to address deficits to improve functional mobility. PT recommends  PT to facilitate progress.  -SR     Row Name 11/02/21 1537          Therapy Assessment/Plan (PT)    Patient/Family Therapy Goals Statement (PT) get home  -SR     Rehab Potential (PT) good, to achieve stated therapy goals  -SR     Criteria for Skilled Interventions Met (PT) yes; meets criteria  -SR     Predicted Duration of Therapy Intervention (PT) 1 week  -SR     Row Name 11/02/21 1537          Vital Signs    Pre SpO2 (%) 95  -SR     O2 Delivery Pre Treatment room air  -SR     Intra SpO2 (%) 92  -SR     O2 Delivery Intra Treatment room air  -SR     Post SpO2 (%) 98  -SR     O2 Delivery Post Treatment room air  -SR     Pre Patient Position Supine  -SR     Intra Patient Position Standing  -SR     Post Patient Position Sitting  -SR     Row Name 11/02/21 1537          Positioning and Restraints    Pre-Treatment Position in bed  -SR     Post Treatment Position chair  -SR     In Chair reclined; call light within reach; encouraged to call for assist; exit alarm on; with family/caregiver; notified nsg; legs elevated   -SR           User Key  (r) = Recorded By, (t) = Taken By, (c) = Cosigned By    Initials Name Provider Type    Genevieve Orlando Physical Therapist               Outcome Measures     Row Name 11/02/21 1541          How much help from another person do you currently need...    Turning from your back to your side while in flat bed without using bedrails? 4  -SR     Moving from lying on back to sitting on the side of a flat bed without bedrails? 4  -SR     Moving to and from a bed to a chair (including a wheelchair)? 3  -SR     Standing up from a chair using your arms (e.g., wheelchair, bedside chair)? 3  -SR     Climbing 3-5 steps with a railing? 3  -SR     To walk in hospital room? 3  -SR     AM-PAC 6 Clicks Score (PT) 20  -SR     Row Name 11/02/21 1541          Functional Assessment    Outcome Measure Options AM-PAC 6 Clicks Basic Mobility (PT)  -SR           User Key  (r) = Recorded By, (t) = Taken By, (c) = Cosigned By    Initials Name Provider Type     Genevieve Oliver Physical Therapist                             Physical Therapy Education                 Title: PT OT SLP Therapies (Done)     Topic: Physical Therapy (Done)     Point: Mobility training (Done)     Learning Progress Summary           Patient Acceptance, E, VU by SR at 11/2/2021 1542   Family Acceptance, E, VU by SR at 11/2/2021 1542                   Point: Home exercise program (Done)     Learning Progress Summary           Patient Acceptance, E, VU by SR at 11/2/2021 1542   Family Acceptance, E, VU by SR at 11/2/2021 1542                   Point: Body mechanics (Done)     Learning Progress Summary           Patient Acceptance, E, VU by SR at 11/2/2021 1542   Family Acceptance, E, VU by SR at 11/2/2021 1542                   Point: Precautions (Done)     Learning Progress Summary           Patient Acceptance, E, VU by SR at 11/2/2021 1542   Family Acceptance, E, VU by SR at 11/2/2021 1542                               User Key     Initials  Effective Dates Name Provider Type Discipline    SR 02/08/21 -  Genevieve Oliver Physical Therapist PT              PT Recommendation and Plan  Planned Therapy Interventions (PT): balance training, bed mobility training, gait training, home exercise program, manual therapy techniques, neuromuscular re-education, patient/family education, postural re-education, ROM (range of motion), stair training, strengthening, stretching, transfer training  Plan of Care Reviewed With: patient, spouse  Progress: no change  Outcome Summary: Pt is a 65 yo male s/p revision L TKA 11/1/21. Pt reports he lives with wife in house with 2 ELIF L rail, was not useing DME and was independent with mobility prior to admission. Pt reports he has a walker, commode chair, and shower chair at home. Today pt completed 5 reps TKA exercises, ambulated 2 x 80ft with RW and ascended/descended 3 steps with assist x 1 demonstrating impaired strength, balance, gait and activity tolerance. Pt could benefit from skilled PT to address deficits to improve functional mobility. PT recommends  PT to facilitate progress.     Time Calculation:    PT Charges     Row Name 11/02/21 1543             Time Calculation    Start Time 1443  -SR      Stop Time 1529  -SR      Time Calculation (min) 46 min  -SR      PT Received On 11/02/21  -SR      PT - Next Appointment 11/03/21  -      PT Goal Re-Cert Due Date 11/09/21  -              Time Calculation- PT    Total Timed Code Minutes- PT 46 minute(s)  -SR            User Key  (r) = Recorded By, (t) = Taken By, (c) = Cosigned By    Initials Name Provider Type    SR Genevieve Oliver Physical Therapist              Therapy Charges for Today     Code Description Service Date Service Provider Modifiers Qty    20616920730 HC PT EVAL MOD COMPLEXITY 2 11/2/2021 Genevieve Oliver GP 1    12300709212 HC GAIT TRAINING EA 15 MIN 11/2/2021 Genevieve Oliver GP 1    47843047863 HC PT THER PROC EA 15 MIN 11/2/2021 Genevieve Oliver GP 1    82938429191  HC PT THERAPEUTIC ACT EA 15 MIN 11/2/2021 Genevieve Oliver GP 1          PT G-Codes  Outcome Measure Options: AM-PAC 6 Clicks Basic Mobility (PT)  AM-PAC 6 Clicks Score (PT): 20    Genevieve Oliver  11/2/2021

## 2021-11-02 NOTE — CASE MANAGEMENT/SOCIAL WORK
Continued Stay Note  [facility]     Patient Name: Corey Velasquez  MRN: [id number]  Today's Date: 11/2/2021    Admit Date: 11/1/2021     Discharge Plan     Row Name 11/02/21 0914       Plan    Plan Home with family support & [facility].    Patient/Family in Agreement with Plan yes    Plan Comments [facility] unable [facility] at [facility] due to staffing. Updated the patient and his wife/Rosana who are agreeable and request [facility]. Referral sent in Epic.               Discharge Codes    No documentation.               Expected Discharge Date and Time     Expected Discharge Date Expected Discharge Time    Nov 2, 2021             Sayra Dasilva RN

## 2021-11-02 NOTE — CONSULTS
[facility]      Patient Identification:  NAME:  Corey Velasquez  Age:  66 y.o.   Sex:  male   :  1955   MRN:  [id number]       Chief complaint: Urinary Retention     History of present illness:  Corey Velasquez is a 66 year old man s/p Left total Knee revision 2021.  Post-operatively he developed urinary retention. He was bladder scanned for > 200 mL and has required straight catheterization for > 500 mL. No history of urologist.  He says he has experienced urinary retention post-operatively that has resolved on its own in the past.  He declines have a Espinoza catheter placed at this time and prefers to trial on his own over the next 24 hours with straight-caths as needed.    Cr 1.02  Wbc 6.39      Past medical history:  Past Medical History:   Diagnosis Date   • ADD (attention deficit disorder)     PREDOMINANTLY INATTENTIVE TYPE   • Anemia, unspecified    • Anesthesia complication     DIFFICULT TO AROUSE AFTER GALLBLADDER SURGERY 10-12 YEARS AGO, HAS HAD SURGERY SINCE WITHOUT DIFFICULTY   • Cough    • Depression     Major depressive disorder, single episode, mild   • Essential (primary) hypertension    • Knee pain, left    • Limited mobility     LEFT KNEE   • Other hydrocele    • Tachycardia, unspecified    • Unilateral primary osteoarthritis, left knee    • Weakness     LEFT KNEE       Past surgical history:  Past Surgical History:   Procedure Laterality Date   • CATARACT EXTRACTION, BILATERAL     • CHOLECYSTECTOMY     • COLONOSCOPY      ; NEG   • ENDOSCOPY      GASTRITIS   • JOINT REPLACEMENT Left 2016    TKA   • ORTHOPEDIC SURGERY Right     HAND   • REVISION TOTAL KNEE ARTHROPLASTY Left    • TONSILLECTOMY AND ADENOIDECTOMY     • TOTAL KNEE ARTHROPLASTY REVISION Left 2021    Procedure: LEFT TOTAL KNEE  REVISION;  Surgeon: Jan Kruse MD;  Location: [facility];  Service: Orthopedics;  Laterality: Left;       Allergies:  Patient has no known allergies.    Home  medications:  Medications Prior to Admission   Medication Sig Dispense Refill Last Dose   • Adderall XR 30 MG 24 hr capsule Take 1 capsule by mouth Every Morning 30 capsule 0 Past Week at Unknown time   • FLUoxetine (PROzac) 40 MG capsule TAKE ONE CAPSULE BY MOUTH DAILY (Patient taking differently: Take 40 mg by mouth Daily.) 90 capsule 1 2021 at 0600   • metoprolol succinate XL (TOPROL-XL) 25 MG 24 hr tablet TAKE ONE TABLET BY MOUTH DAILY (Patient taking differently: Take 25 mg by mouth Daily.) 90 tablet 2 2021 at 0600        Hospital medications:  amphetamine-dextroamphetamine XR, 30 mg, Oral, Daily  aspirin, 81 mg, Oral, Q12H  docusate sodium, 200 mg, Oral, BID  famotidine, 40 mg, Oral, Daily  FLUoxetine, 40 mg, Oral, Daily  metoprolol succinate XL, 25 mg, Oral, Daily  sodium chloride, 3 mL, Intravenous, Q12H  tamsulosin, 0.4 mg, Oral, Daily      lactated ringers, 100 mL/hr, Last Rate: 100 mL/hr (21 0541)      •  acetaminophen  •  HYDROcodone-acetaminophen  •  HYDROcodone-acetaminophen  •  HYDROmorphone **AND** naloxone  •  melatonin  •  ondansetron **OR** ondansetron  •  promethazine  •  sodium chloride    Family history:  Family History   Problem Relation Age of Onset   • Pneumonia Mother    • Lymphoma Mother    • Heart attack Father    • Other Father         BRAIN TUMOR   • Malig Hyperthermia Neg Hx        Social history:  Social History     Tobacco Use   • Smoking status: Former Smoker     Packs/day: 2.00     Years: 30.00     Pack years: 60.00     Types: Cigarettes     Quit date: 1/15/2012     Years since quittin.8   • Smokeless tobacco: Never Used   Vaping Use   • Vaping Use: Never used   Substance Use Topics   • Alcohol use: Not Currently   • Drug use: Defer       REVIEW OF SYSTEMS:  Constitutional - Negative for fevers/chills  Eyes/Ears/Nose/Mouth/Throat - Negative for changes in vision  Cardiovascular - Negative for chest pain, dysrhythmia  Respiratory - Negative for  dyspnea  Gastrointestinal - Negative for nausea or vomiting  Genitourinary - Negative for dysuria  Hematologic/Lymphatic - Negative for bruising  Skin - Negative for erythema  Endocrine - Negative for history of diabetes    Objective:  TMax 24 hours:   Temp (24hrs), Av.5 °F (36.4 °C), Min:96.8 °F (36 °C), Max:98.4 °F (36.9 °C)      Vitals Ranges:   Temp:  [96.8 °F (36 °C)-98.4 °F (36.9 °C)] 97.6 °F (36.4 °C)  Heart Rate:  [64-97] 90  Resp:  [14-16] 16  BP: ()/(69-91) 118/70    Intake/Output Last 3 shifts:  I/O last 3 completed shifts:  In:  [P.O.:120; I.V.:1758; IV Piggyback:200]  Out: 900 [Urine:900]     Physical Exam:    General Appearance:    Alert, cooperative, NAD   HEENT:    No trauma, pupils reactive, hearing intact   Back:     No CVA tenderness   Lungs:     Respirations unlabored, no wheezing    Heart:    RRR, intact peripheral pulses   Abdomen:     Soft, NDNT, no masses, no guarding   :    Testes descended bilaterally, no nodules.  Penis normal.      No scrotal or penile rashes noted   Extremities:   No edema, no deformity   Lymphatic:   No neck or groin LAD   Skin:   No bleeding, bruising or rashes   Neuro/Psych:   Orientation intact, mood/affect pleasant, no focal findings       Results review:   I reviewed the patient's new clinical results.    Data review:  Lab Results (last 24 hours)     Procedure Component Value Units Date/Time    Basic Metabolic Panel [[id number]]  (Abnormal) Collected: 21 0636    Specimen: Blood Updated: 21 0816     Glucose 131 mg/dL      BUN 21 mg/dL      Creatinine 1.02 mg/dL      Sodium 132 mmol/L      Potassium 5.4 mmol/L      Chloride 98 mmol/L      CO2 24.1 mmol/L      Calcium 8.5 mg/dL      eGFR Non African Amer 74 mL/min/1.73      BUN/Creatinine Ratio 20.6     Anion Gap 9.9 mmol/L     Narrative:      GFR Normal >60  Chronic Kidney Disease <60  Kidney Failure <15      Hemoglobin & Hematocrit, Blood [[id number]]  (Abnormal) Collected: 21 0636     Specimen: Blood Updated: 11/02/21 0743     Hemoglobin 11.0 g/dL      Hematocrit 31.6 %     Hemoglobin & Hematocrit, Blood [[id number]]  (Abnormal) Collected: 11/01/21 1929    Specimen: Blood Updated: 11/1955     Hemoglobin 11.9 g/dL      Hematocrit 34.4 %            Imaging:  Imaging Results (Last 24 Hours)     Procedure Component Value Units Date/Time    XR Femur 2 View Left [[id number]] Collected: 11/01/21 2100     Updated: 11/02/21 1326    Narrative:      LEFT FEMUR     HISTORY: Knee replacement revision.     FINDINGS: AP and lateral views left femur again demonstrates a left knee  prosthesis which appears to be in satisfactory position. There is no  evidence of fracture.     This report was finalized on 11/2/2021 1:22 PM by Dr. Matteo Gerber M.D.       XR Knee 1 or 2 View Left [[id number]] Collected: 11/01/21 1517     Updated: 11/01/21 1520    Narrative:      XR KNEE 1 OR 2 VW LEFT-     INDICATIONS: Postoperative evaluation.     TECHNIQUE: Frontal and lateral views of the left knee     COMPARISON: 10/20/2021     FINDINGS:      Intact appearing knee arthroplasty hardware is seen with adjacent  surgical soft tissue gas. No acute fracture is identified.          Impression:         Postsurgical changes.           This report was finalized on 11/1/2021 3:17 PM by Dr. George Thompson M.D.                Assessment:       Failed total knee, left, initial encounter (HCC)    Urinary Retention  Post-operative    - Discussed the nature of urinary Retention and contributing factors.  - He declines have a Espinoza catheter placed at this time and prefers to trial on his own over the next 24 hours with straight-caths as needed.    Plan:     - Start Flomax   - Bladder scan every 3-4 hours and after each void  - If Bladder volume > 300 mL, recommend straight-catheterization   - Follow-up with Dr. Vasu Lei ([facility]) 1-2 weeks after Discharge for voiding trial or PVR - Schedulers messaged.  - Urology will  follow briefly for resolution of urinary retention     Vasu Lei MD  11/02/21  13:45 EDT

## 2021-11-02 NOTE — DISCHARGE SUMMARY
Discharge Summary    Date of Admission: 11/1/2021  7:14 AM  Date of Discharge:  11/2/2021    Discharge Diagnosis:   Failed total knee, left, initial encounter ([facility]) [S41.859F]      PMHX:   Past Medical History:   Diagnosis Date   • ADD (attention deficit disorder)     PREDOMINANTLY INATTENTIVE TYPE   • Anemia, unspecified    • Anesthesia complication     DIFFICULT TO AROUSE AFTER GALLBLADDER SURGERY 10-12 YEARS AGO, HAS HAD SURGERY SINCE WITHOUT DIFFICULTY   • Cough    • Depression     Major depressive disorder, single episode, mild   • Essential (primary) hypertension    • Knee pain, left    • Limited mobility     LEFT KNEE   • Other hydrocele    • Tachycardia, unspecified    • Unilateral primary osteoarthritis, left knee    • Weakness     LEFT KNEE       Discharge Disposition  Home-Health Care [facility]    Procedures Performed  Procedure(s):  LEFT TOTAL KNEE  REVISION       Indication for Admission  Patient is a 66 y.o. male admitted after undergoing the above surgical procedure. They were admitted for post-operative pain control, medical management and physical therapy.  They progressed with physical therapy.  They did have urinary retention on POD 0, and this improved overnight, with the patient reporting the ability to void independently.  They were deemed stable for discharge.      Consults:   Consults     No orders found for last 30 day(s).          Discharge Instructions:  Patient is weight bearing as tolerated on the operative leg.  Patient is to progress range of motion and ambulation as tolerated.  Use walker as needed for stability and gait.  May progress to cane as tolerated.  The dressing should be left on knee until post-operative day 7, and then removed.  Dressing is waterproof. Patient may shower.  Use ace wrap as needed for swelling.  Patient will follow-up in the office in 2 weeks.  Call the office at [phone] for any questions or concerns.      Discharge Medications     Discharge Medications       New Medications      Instructions Start Date   aspirin 81 MG EC tablet   81 mg, Oral, Every 12 Hours Scheduled      docusate sodium 100 MG capsule   200 mg, Oral, 2 Times Daily      HYDROcodone-acetaminophen  MG per tablet  Commonly known as: Norco   1-2 tablets, Oral, Every 4 Hours PRN      ondansetron 4 MG tablet  Commonly known as: ZOFRAN   4 mg, Oral, Every 6 Hours PRN      tamsulosin 0.4 MG capsule 24 hr capsule  Commonly known as: FLOMAX   0.4 mg, Oral, Daily         Continue These Medications      Instructions Start Date   Adderall XR 30 MG 24 hr capsule  Generic drug: amphetamine-dextroamphetamine XR   30 mg, Oral, Every Morning      FLUoxetine 40 MG capsule  Commonly known as: PROzac   TAKE ONE CAPSULE BY MOUTH DAILY      metoprolol succinate XL 25 MG 24 hr tablet  Commonly known as: TOPROL-XL   TAKE ONE TABLET BY MOUTH DAILY             Discharge Diet:   Diet Instructions     Advance Diet As Tolerated      Resume your normal diet.          Activity at Discharge:   Activity Instructions     Activity as Tolerated      Work Restrictions      Type of Restriction: Work    May Return to Work: Other    Return to Work Instructions: No work until cleared by physician's office.          Follow-up Appointments  The patient will follow up in the office in 2 weeks.  Appointment was made at time of surgery scheduling.  Call the office for appointment time or changes.  [phone]    The patient should also follow up with PCP regarding urinary retention.      Future Appointments   Date Time Provider Department Center   12/22/2021 10:15 AM Cleopatra Higginbotham MD [facility]     Additional Instructions for the Follow-ups that You Need to Schedule     Ambulatory Referral to Home Health   As directed      Face to Face Visit Date: 11/2/2021    Follow-up provider for Plan of Care?: I will be treating the patient on an ongoing basis.  Please send me the Plan of Care for signature.    Follow-up provider:  JAN ARANGO [[id number]]    Reason/Clinical Findings: s/p TKA    Describe mobility limitations that make leaving home difficult: Taxing effort    Nursing/Therapeutic Services Requested: Physical Therapy    PT orders: Total joint pathway    Frequency: 1 Week 1         Discharge Follow-up with Specified Provider: Dr. Arango office.  An appointment has already been made at time of surgery scheduling.  Call office if you need to verify appointment time or date.  [phone]; 2 Weeks   As directed      To: Dr. Arango office.  An appointment has already been made at time of surgery scheduling.  Call office if you need to verify appointment time or date.  [phone]    Follow Up: 2 Weeks               Test Results Pending at Discharge  none     Jan Arango MD  11/02/21,  06:34 EDT

## 2021-11-02 NOTE — PLAN OF CARE
Goal Outcome Evaluation:Patient has urinary retention. Straight cath  at midnight. Pt is dependent on oxygen at this time, attempts to wean him off was not tolerated.  Pt bladder scanned at 0235 and was only retaining 213ml. Denies pain or discomfort at this time.

## 2021-11-02 NOTE — PLAN OF CARE
Goal Outcome Evaluation:  Plan of Care Reviewed With: patient, spouse        Progress: no change  Outcome Summary: Pt is a 67 yo male s/p revision L TKA 11/1/21. Pt reports he lives with wife in house with 2 ELIF L rail, was not useing DME and was independent with mobility prior to admission. Pt reports he has a walker, commode chair, and shower chair at home. Today pt completed 5 reps TKA exercises, ambulated 2 x 80ft with RW and ascended/descended 3 steps with assist x 1 demonstrating impaired strength, balance, gait and activity tolerance. Pt could benefit from skilled PT to address deficits to improve functional mobility. PT recommends  PT to facilitate progress.    Patient was wearing a face mask during this therapy encounter. Therapist used appropriate personal protective equipment including eye protection, mask, and gloves.  Mask used was standard procedure mask. Appropriate PPE was worn during the entire therapy session. Hand hygiene was completed before and after therapy session. Patient is not in enhanced droplet precautions.

## 2021-11-03 ENCOUNTER — TRANSITIONAL CARE MANAGEMENT TELEPHONE ENCOUNTER (OUTPATIENT)
Dept: CALL CENTER | Facility: HOSPITAL | Age: 66
End: 2021-11-03

## 2021-11-03 NOTE — OUTREACH NOTE
Call Center TCM Note      Responses   [facility] patient discharged from? [place]   Does the patient have one of the following disease processes/diagnoses(primary or secondary)? Total Joint Replacement   Joint surgery performed? Knee   TCM attempt successful? No   Unsuccessful attempts Attempt 1          Meredith Corado RN    11/3/2021, 09:04 EDT

## 2021-11-03 NOTE — OUTREACH NOTE
Call Center TCM Note      Responses   [facility] patient discharged from? [place]   Does the patient have one of the following disease processes/diagnoses(primary or secondary)? Total Joint Replacement   Joint surgery performed? Knee   TCM attempt successful? No   Unsuccessful attempts Attempt 2          Meredith Corado RN    11/3/2021, 11:56 EDT

## 2021-11-03 NOTE — OUTREACH NOTE
Prep Survey      Responses   [facility] patient discharged from? [place]   Is LACE score < 7 ? Yes   Emergency Room discharge w/ pulse ox? No   Eligibility [facility]   Date of Admission 11/01/21   Date of Discharge 11/02/21   Discharge Disposition Home or Self Care   Discharge diagnosis LEFT TOTAL KNEE  REVISION   Does the patient have one of the following disease processes/diagnoses(primary or secondary)? Total Joint Replacement   Does the patient have Home health ordered? Yes   What is the Home health agency?  [facility]   Is there a DME ordered? No   Prep survey completed? Yes          Lorena Garcia RN

## 2021-11-03 NOTE — PROGRESS NOTES
Case Management Discharge Note      Final Note: Home with [facility]    Provided Post Acute Provider List?: N/A    Selected Continued Care - Discharged on 11/2/2021 Admission date: 11/1/2021 - Discharge disposition: Home-Health Care Svc    Destination    No services have been selected for the patient.              Durable Medical Equipment    No services have been selected for the patient.              Dialysis/Infusion    No services have been selected for the patient.              Home Medical Care Coordination complete.    Service Provider Selected Services Address Phone Fax Patient Preferred    [facility] [address] [place] [address] [phone] --          Therapy    No services have been selected for the patient.              Community Resources    No services have been selected for the patient.              Community & DME    No services have been selected for the patient.                       Final Discharge Disposition Code: 06 - home with home health care

## 2021-11-04 ENCOUNTER — TRANSITIONAL CARE MANAGEMENT TELEPHONE ENCOUNTER (OUTPATIENT)
Dept: CALL CENTER | Facility: HOSPITAL | Age: 66
End: 2021-11-04

## 2021-11-04 NOTE — OUTREACH NOTE
Call Center TCM Note      Responses   [facility] patient discharged from? [place]   Does the patient have one of the following disease processes/diagnoses(primary or secondary)? Total Joint Replacement   Joint surgery performed? Knee   TCM attempt successful? Yes   Call start time 0840   Call end time 0847   Discharge diagnosis LEFT TOTAL KNEE  REVISION   Person spoke with today (if not patient) and relationship Mrs. Velasquez, wife   Does the patient have all medications related to this admission filled (includes all antibiotics, pain medications, etc.) Yes   Is the patient taking all medications as directed (includes completed medication regime)? Yes   Is the patient able to teach back alternate methods of pain control? Ice,  Knee-elevation/no pillow under knee,  Short, frequent activity   Medication comments Norco effective for pain control---no BM yet, taking bowel regimen as instructed-wife reports po intake has just returned to normal, encouraged fluids, fresh foods to promote regularity   Comments regarding appointments Patient will need to schedule a two week surgical f/u   Does the patient have a follow up appointment with their surgeon? Yes   Has the patient kept scheduled appointments due by today? N/A   Comments Declines to schedule PCP appt at this time--will self-schedule   What is the Home health agency?  [facility]   Has home health visited the patient within 72 hours of discharge? Yes   Home health comments Pt   Psychosocial issues? No   Has the patient began therapy sessions (either in the home or as an out patient)? Yes   Does the patient have a wound vac in place? No   Has the patient fallen since discharge? No   Did the patient receive a copy of their discharge instructions? Yes   Nursing interventions Reviewed instructions with patient   What is the patient's perception of their functional status since discharge? Improving  [Wife reports pt doing well--pain medications effective, PT has  started working with pt, issues with urinary retention resolved--no concerns communicated]   Is the patient able to teach back signs and symptoms of infection? Temp >100.4 for 24h or longer,  Incisional drainage,  Increased swelling or redness around incision (not associated with surgical edema),  Severe discomfort or pain,  Changes in mobility   Is the patient able to teach back how to prevent infection? Keep incision covered if drainage,  Eat well-balanced diet,  No tub baths, hot tub or swimming,  No lotion or creams  [Pt instructed in AVS to leave dressing intact for 7 days--]   Is the patient able to teach back signs and symptoms of DVT? Redness in calf,  Area hot to touch,  Shortness of breath or chest pain,  Swelling in calf,  Severe pain in calf   Is the patient able to teach back home safety measures? Ability to shower   If the patient is a current smoker, are they able to teach back resources for cessation? Not a smoker   Is the patient/caregiver able to teach back the hierarchy of who to call/visit for symptoms/problems? PCP, Specialist, Home health nurse, Urgent Care, ED, 911 Yes   TCM call completed? Yes          Kathie Park RN    11/4/2021, 08:53 EDT

## 2021-11-23 ENCOUNTER — OFFICE VISIT (OUTPATIENT)
Dept: FAMILY MEDICINE CLINIC | Age: 66
End: 2021-11-23

## 2021-11-23 ENCOUNTER — LAB (OUTPATIENT)
Dept: LAB | Facility: HOSPITAL | Age: 66
End: 2021-11-23

## 2021-11-23 VITALS
WEIGHT: 207 LBS | SYSTOLIC BLOOD PRESSURE: 120 MMHG | BODY MASS INDEX: 32.49 KG/M2 | TEMPERATURE: 98.2 F | HEART RATE: 101 BPM | DIASTOLIC BLOOD PRESSURE: 78 MMHG | OXYGEN SATURATION: 100 % | HEIGHT: 67 IN

## 2021-11-23 DIAGNOSIS — R53.83 OTHER FATIGUE: ICD-10-CM

## 2021-11-23 DIAGNOSIS — Z23 ENCOUNTER FOR IMMUNIZATION: ICD-10-CM

## 2021-11-23 DIAGNOSIS — Z12.5 PROSTATE CANCER SCREENING: ICD-10-CM

## 2021-11-23 DIAGNOSIS — Z79.899 ENCOUNTER FOR LONG-TERM (CURRENT) USE OF MEDICATIONS: ICD-10-CM

## 2021-11-23 DIAGNOSIS — F98.8 ATTENTION DEFICIT DISORDER (ADD) WITHOUT HYPERACTIVITY: ICD-10-CM

## 2021-11-23 DIAGNOSIS — I10 HYPERTENSION, ESSENTIAL: ICD-10-CM

## 2021-11-23 DIAGNOSIS — T84.018D FAILURE OF TOTAL KNEE REPLACEMENT, SUBSEQUENT ENCOUNTER: Primary | ICD-10-CM

## 2021-11-23 DIAGNOSIS — Z96.659 FAILURE OF TOTAL KNEE REPLACEMENT, SUBSEQUENT ENCOUNTER: Primary | ICD-10-CM

## 2021-11-23 DIAGNOSIS — D50.8 OTHER IRON DEFICIENCY ANEMIA: ICD-10-CM

## 2021-11-23 PROBLEM — D50.9 IRON DEFICIENCY ANEMIA: Status: ACTIVE | Noted: 2021-11-23

## 2021-11-23 PROBLEM — D64.9 ABSOLUTE ANEMIA: Status: ACTIVE | Noted: 2021-11-23

## 2021-11-23 PROBLEM — T84.018A FAILED TOTAL KNEE ARTHROPLASTY: Status: ACTIVE | Noted: 2021-11-01

## 2021-11-23 PROBLEM — Z00.00 ANNUAL PHYSICAL EXAM: Status: RESOLVED | Noted: 2021-09-22 | Resolved: 2021-11-23

## 2021-11-23 LAB
AMPHET+METHAMPHET UR QL: POSITIVE
AMPHETAMINES UR QL: NEGATIVE
BARBITURATES UR QL SCN: NEGATIVE
BASOPHILS # BLD AUTO: 0.06 10*3/MM3 (ref 0–0.2)
BASOPHILS NFR BLD AUTO: 0.8 % (ref 0–1.5)
BENZODIAZ UR QL SCN: NEGATIVE
BUPRENORPHINE SERPL-MCNC: NEGATIVE NG/ML
CANNABINOIDS SERPL QL: NEGATIVE
COCAINE UR QL: NEGATIVE
DEPRECATED RDW RBC AUTO: 42.6 FL (ref 37–54)
EOSINOPHIL # BLD AUTO: 0.21 10*3/MM3 (ref 0–0.4)
EOSINOPHIL NFR BLD AUTO: 2.8 % (ref 0.3–6.2)
ERYTHROCYTE [DISTWIDTH] IN BLOOD BY AUTOMATED COUNT: 12.3 % (ref 12.3–15.4)
EXPIRATION DATE: ABNORMAL
HCT VFR BLD AUTO: 36.7 % (ref 37.5–51)
HGB BLD-MCNC: 12.1 G/DL (ref 13–17.7)
IMM GRANULOCYTES # BLD AUTO: 0.01 10*3/MM3 (ref 0–0.05)
IMM GRANULOCYTES NFR BLD AUTO: 0.1 % (ref 0–0.5)
LYMPHOCYTES # BLD AUTO: 2 10*3/MM3 (ref 0.7–3.1)
LYMPHOCYTES NFR BLD AUTO: 26.8 % (ref 19.6–45.3)
Lab: ABNORMAL
MCH RBC QN AUTO: 30.8 PG (ref 26.6–33)
MCHC RBC AUTO-ENTMCNC: 33 G/DL (ref 31.5–35.7)
MCV RBC AUTO: 93.4 FL (ref 79–97)
MDMA UR QL SCN: NEGATIVE
METHADONE UR QL SCN: NEGATIVE
MONOCYTES # BLD AUTO: 0.68 10*3/MM3 (ref 0.1–0.9)
MONOCYTES NFR BLD AUTO: 9.1 % (ref 5–12)
NEUTROPHILS NFR BLD AUTO: 4.49 10*3/MM3 (ref 1.7–7)
NEUTROPHILS NFR BLD AUTO: 60.4 % (ref 42.7–76)
OPIATES UR QL: POSITIVE
OXYCODONE UR QL SCN: NEGATIVE
PCP UR QL SCN: NEGATIVE
PLATELET # BLD AUTO: 515 10*3/MM3 (ref 140–450)
PMV BLD AUTO: 8.9 FL (ref 6–12)
RBC # BLD AUTO: 3.93 10*6/MM3 (ref 4.14–5.8)
WBC NRBC COR # BLD: 7.45 10*3/MM3 (ref 3.4–10.8)

## 2021-11-23 PROCEDURE — 36415 COLL VENOUS BLD VENIPUNCTURE: CPT

## 2021-11-23 PROCEDURE — 82607 VITAMIN B-12: CPT

## 2021-11-23 PROCEDURE — 82746 ASSAY OF FOLIC ACID SERUM: CPT

## 2021-11-23 PROCEDURE — G0103 PSA SCREENING: HCPCS

## 2021-11-23 PROCEDURE — 90686 IIV4 VACC NO PRSV 0.5 ML IM: CPT | Performed by: FAMILY MEDICINE

## 2021-11-23 PROCEDURE — 90471 IMMUNIZATION ADMIN: CPT | Performed by: FAMILY MEDICINE

## 2021-11-23 PROCEDURE — 80053 COMPREHEN METABOLIC PANEL: CPT

## 2021-11-23 PROCEDURE — 99213 OFFICE O/P EST LOW 20 MIN: CPT | Performed by: FAMILY MEDICINE

## 2021-11-23 PROCEDURE — 84466 ASSAY OF TRANSFERRIN: CPT

## 2021-11-23 PROCEDURE — 85025 COMPLETE CBC W/AUTO DIFF WBC: CPT

## 2021-11-23 PROCEDURE — 83540 ASSAY OF IRON: CPT

## 2021-11-23 PROCEDURE — 80305 DRUG TEST PRSMV DIR OPT OBS: CPT | Performed by: FAMILY MEDICINE

## 2021-11-23 PROCEDURE — 84443 ASSAY THYROID STIM HORMONE: CPT

## 2021-11-23 PROCEDURE — 82728 ASSAY OF FERRITIN: CPT

## 2021-11-23 RX ORDER — CYCLOBENZAPRINE HCL 10 MG
1 TABLET ORAL 3 TIMES DAILY PRN
COMMUNITY
Start: 2021-09-30 | End: 2022-01-12

## 2021-11-23 NOTE — ASSESSMENT & PLAN NOTE
Doing well since discharge from [facility] with Dr. Kruse on 11/1/2021.  He is no longer requiring pain meds except during physical therapy.  He is no longer on his bowel regimen.  He is no longer requiring Flomax for the urinary retention in the immediate postoperative period.  Continue to monitor, but it sounds like he is finally feeling better as far as the knee goes.

## 2021-11-23 NOTE — PROGRESS NOTES
Transitional Care Follow Up Visit  Subjective     Corey Velasquez is a 66 y.o. male who presents for a transitional care management visit.    Within 48 business hours after discharge our office contacted him via telephone to coordinate his care and needs.      I reviewed and discussed the details of that call along with the discharge summary, hospital problems, inpatient lab results, inpatient diagnostic studies, and consultation reports with Corey.     Current outpatient and discharge medications have been reconciled for the patient.  Reviewed by: Cleopatra Higginbotham MD      Date of TCM Phone Call 11/2/2021   [facility]   Date of Admission 11/1/2021   Date of Discharge 11/2/2021   Discharge Disposition Home or Self Care     Risk for Readmission (LACE) No data recorded    History of Present Illness   Course During Hospital Stay: He was admitted from 11/1/2021 to 11/2/2021 to [facility] for left total knee revision by Dr. Kruse.  He tolerated the procedure well except for some mild urinary retention on the day of surgery.  However he was able to void spontaneously within 24 hours of his surgery.  He has not had any issues since getting home.  He went home on some Flomax but not needing them anymore.  He was discharged home to follow-up with home health PT.  He was sent home with Norco for pain control.  No bowel regimen anymore because he is not really using the Norco.  He is not having any trouble constipation.  He is working with physical therapy.  He has been using his Norco just occasionally to get through PT.  He has gone to his 2 weeks post-op check.      He is on Adderall XR 30 mg daily for ADD.  He has been stable on this regimen for many years now.  Symptoms of focus and concentration have been well controlled.    No adverse effects.  He was originally diagnosed by Dr. Zeng.  He does not take drug holidays.      He is on metoprolol succinate for HTN.    His blood  pressure has been well controlled.    No chest pain, palpitations or shortness of breath.  Follows with Cardiology.      He is on Prozac for depression.    His symptoms have been well controlled.    No depression or anhedonia, no anxiety or panic attacks.       He is on Mobic for aches and pains from generalized OA.  That works to keep him comfortable pretty well.     The following portions of the patient's history were reviewed and updated as appropriate: allergies, current medications, past family history, past medical history, past social history, past surgical history and problem list.    Review of Systems   Constitutional: Positive for fatigue. Negative for chills and fever.   HENT: Negative for congestion, hearing loss and rhinorrhea.    Eyes: Negative for pain and visual disturbance.   Respiratory: Negative for cough and shortness of breath.    Cardiovascular: Negative for chest pain and palpitations.   Gastrointestinal: Negative for abdominal pain, constipation, diarrhea, nausea and vomiting.   Genitourinary: Negative for dysuria and hematuria.   Musculoskeletal: Positive for arthralgias. Negative for myalgias.   Skin: Negative for rash.   Neurological: Negative for weakness and numbness.       Objective   Physical Exam  Vitals reviewed.   Constitutional:       General: He is not in acute distress.     Appearance: Normal appearance. He is well-developed.   HENT:      Head: Normocephalic and atraumatic.      Right Ear: External ear normal.      Left Ear: External ear normal.      Nose: Nose normal.      Mouth/Throat:      Mouth: Mucous membranes are moist.   Eyes:      Extraocular Movements: Extraocular movements intact.      Conjunctiva/sclera: Conjunctivae normal.      Pupils: Pupils are equal, round, and reactive to light.   Cardiovascular:      Rate and Rhythm: Normal rate and regular rhythm.      Heart sounds: No murmur heard.      Pulmonary:      Effort: Pulmonary effort is normal.      Breath sounds:  Normal breath sounds. No wheezing, rhonchi or rales.   Abdominal:      General: Bowel sounds are normal. There is no distension.      Palpations: Abdomen is soft.      Tenderness: There is no abdominal tenderness.   Musculoskeletal:         General: Normal range of motion.      Comments: Well healing scar over the L anterior knee, no erythema/ dehiscence or drainage   Neurological:      Mental Status: He is alert.   Psychiatric:         Mood and Affect: Affect normal.           Diagnoses and all orders for this visit:    1. Failure of total knee replacement, subsequent encounter (Primary)  Assessment & Plan:  Doing well since discharge from [facility] with Dr. Kruse on 11/1/2021.  He is no longer requiring pain meds except during physical therapy.  He is no longer on his bowel regimen.  He is no longer requiring Flomax for the urinary retention in the immediate postoperative period.  Continue to monitor, but it sounds like he is finally feeling better as far as the knee goes.      2. Other fatigue  Assessment & Plan:  He has been pretty tired since surgery, reminiscent of the way he felt when he was significantly iron deficient last year.  His last hemoglobin prior to discharge was 11.0, down from 11.9 on admission prior to surgery.  I am going to check some labs including a CBC and iron level.  We will also do some additional fatigue labs.    Orders:  -     CBC & Differential; Future  -     Comprehensive Metabolic Panel; Future  -     TSH; Future  -     Vitamin B12 & Folate; Future    3. Other iron deficiency anemia  -     Iron Profile; Future  -     Ferritin; Future    4. Attention deficit disorder (ADD) without hyperactivity  Assessment & Plan:  Stable on current regimen.  Symptoms are well controlled.  No adverse effects. He does require ongoing use of this controlled substance to function.  Tox screen was due today.  Prior tox screen appropriate.  Christopher was run today.  Refills were not needed today.  RTC 3  months.        5. Encounter for long-term (current) use of medications  -     POC Urine Drug Screen [facility]    6. Hypertension, essential  Assessment & Plan:  Stable.  BP at goal.  No refills needed.  Checking labs.      7. Prostate cancer screening  -     PSA Screen; Future    8. Encounter for immunization  -     FluLaval/Fluarix/Fluzone >6 Months               Answers for HPI/ROS submitted by the patient on 11/17/2021  What is the primary reason for your visit?: Other  Please describe your symptoms.: Follow up of TKR by   Have you had these symptoms before?: Yes  How long have you been having these symptoms?: Greater than 2 weeks  Please list any medications you are currently taking for this condition.: No changes

## 2021-11-23 NOTE — ASSESSMENT & PLAN NOTE
He has been pretty tired since surgery, reminiscent of the way he felt when he was significantly iron deficient last year.  His last hemoglobin prior to discharge was 11.0, down from 11.9 on admission prior to surgery.  I am going to check some labs including a CBC and iron level.  We will also do some additional fatigue labs.

## 2021-11-24 LAB
ALBUMIN SERPL-MCNC: 3.8 G/DL (ref 3.5–5.2)
ALBUMIN/GLOB SERPL: 1.1 G/DL
ALP SERPL-CCNC: 222 U/L (ref 39–117)
ALT SERPL W P-5'-P-CCNC: 22 U/L (ref 1–41)
ANION GAP SERPL CALCULATED.3IONS-SCNC: 8.9 MMOL/L (ref 5–15)
AST SERPL-CCNC: 22 U/L (ref 1–40)
BILIRUB SERPL-MCNC: <0.2 MG/DL (ref 0–1.2)
BUN SERPL-MCNC: 7 MG/DL (ref 8–23)
BUN/CREAT SERPL: 8.9 (ref 7–25)
CALCIUM SPEC-SCNC: 9 MG/DL (ref 8.6–10.5)
CHLORIDE SERPL-SCNC: 103 MMOL/L (ref 98–107)
CO2 SERPL-SCNC: 27.1 MMOL/L (ref 22–29)
CREAT SERPL-MCNC: 0.79 MG/DL (ref 0.76–1.27)
FERRITIN SERPL-MCNC: 227 NG/ML (ref 30–400)
FOLATE SERPL-MCNC: >20 NG/ML (ref 4.78–24.2)
GFR SERPL CREATININE-BSD FRML MDRD: 99 ML/MIN/1.73
GLOBULIN UR ELPH-MCNC: 3.4 GM/DL
GLUCOSE SERPL-MCNC: 64 MG/DL (ref 65–99)
IRON 24H UR-MRATE: 65 MCG/DL (ref 59–158)
IRON SATN MFR SERPL: 16 % (ref 20–50)
POTASSIUM SERPL-SCNC: 4.8 MMOL/L (ref 3.5–5.2)
PROT SERPL-MCNC: 7.2 G/DL (ref 6–8.5)
PSA SERPL-MCNC: 1.06 NG/ML (ref 0–4)
SODIUM SERPL-SCNC: 139 MMOL/L (ref 136–145)
TIBC SERPL-MCNC: 419 MCG/DL (ref 298–536)
TRANSFERRIN SERPL-MCNC: 281 MG/DL (ref 200–360)
TSH SERPL DL<=0.05 MIU/L-ACNC: 1.51 UIU/ML (ref 0.27–4.2)
VIT B12 BLD-MCNC: 428 PG/ML (ref 211–946)

## 2021-12-02 DIAGNOSIS — F98.8 ATTENTION DEFICIT DISORDER (ADD) WITHOUT HYPERACTIVITY: ICD-10-CM

## 2021-12-03 RX ORDER — DEXTROAMPHETAMINE SULFATE, DEXTROAMPHETAMINE SACCHARATE, AMPHETAMINE SULFATE AND AMPHETAMINE ASPARTATE 7.5; 7.5; 7.5; 7.5 MG/1; MG/1; MG/1; MG/1
30 CAPSULE, EXTENDED RELEASE ORAL EVERY MORNING
Qty: 30 CAPSULE | Refills: 0 | Status: SHIPPED | OUTPATIENT
Start: 2021-12-03 | End: 2022-01-03 | Stop reason: SDUPTHER

## 2021-12-07 ENCOUNTER — CLINICAL SUPPORT (OUTPATIENT)
Dept: FAMILY MEDICINE CLINIC | Age: 66
End: 2021-12-07

## 2021-12-07 DIAGNOSIS — Z23 NEED FOR COVID-19 VACCINE: Primary | ICD-10-CM

## 2021-12-07 PROCEDURE — 0003A COVID-19 (PFIZER): CPT | Performed by: FAMILY MEDICINE

## 2021-12-07 PROCEDURE — 91300 COVID-19 (PFIZER): CPT | Performed by: FAMILY MEDICINE

## 2021-12-30 RX ORDER — FLUOXETINE HYDROCHLORIDE 40 MG/1
40 CAPSULE ORAL DAILY
Qty: 90 CAPSULE | Refills: 1 | Status: SHIPPED | OUTPATIENT
Start: 2021-12-30 | End: 2022-07-05 | Stop reason: SDUPTHER

## 2021-12-30 NOTE — TELEPHONE ENCOUNTER
Caller: Corey Velasquez    Relationship: Self    Best call back number: [phone]    Requested Prescriptions:   Requested Prescriptions     Pending Prescriptions Disp Refills   • FLUoxetine (PROzac) 40 MG capsule 90 capsule 1     Sig: Take 1 capsule by mouth Daily.        Pharmacy where request should be sent: [facility] [place], [address]  [phone]  - [phone] FX     Additional details provided by patient: PATIENT  HAS 2-3 DAYS LEFT  Does the patient have less than a 3 day supply:  [x] Yes  [] No    Jeanmarie Fong Rep   12/30/21 11:07 EST

## 2022-01-03 DIAGNOSIS — F98.8 ATTENTION DEFICIT DISORDER (ADD) WITHOUT HYPERACTIVITY: ICD-10-CM

## 2022-01-04 RX ORDER — DEXTROAMPHETAMINE SULFATE, DEXTROAMPHETAMINE SACCHARATE, AMPHETAMINE SULFATE AND AMPHETAMINE ASPARTATE 7.5; 7.5; 7.5; 7.5 MG/1; MG/1; MG/1; MG/1
30 CAPSULE, EXTENDED RELEASE ORAL EVERY MORNING
Qty: 30 CAPSULE | Refills: 0 | Status: SHIPPED | OUTPATIENT
Start: 2022-01-04 | End: 2022-01-04 | Stop reason: SDUPTHER

## 2022-01-12 ENCOUNTER — OFFICE VISIT (OUTPATIENT)
Dept: FAMILY MEDICINE CLINIC | Age: 67
End: 2022-01-12

## 2022-01-12 VITALS
SYSTOLIC BLOOD PRESSURE: 130 MMHG | BODY MASS INDEX: 32.96 KG/M2 | HEART RATE: 95 BPM | TEMPERATURE: 98.7 F | WEIGHT: 210 LBS | DIASTOLIC BLOOD PRESSURE: 86 MMHG | HEIGHT: 67 IN

## 2022-01-12 DIAGNOSIS — Z87.891 PERSONAL HISTORY OF TOBACCO USE, PRESENTING HAZARDS TO HEALTH: ICD-10-CM

## 2022-01-12 DIAGNOSIS — F98.8 ATTENTION DEFICIT DISORDER (ADD) WITHOUT HYPERACTIVITY: Primary | Chronic | ICD-10-CM

## 2022-01-12 DIAGNOSIS — I10 HYPERTENSION, ESSENTIAL: ICD-10-CM

## 2022-01-12 DIAGNOSIS — R13.10 DYSPHAGIA, UNSPECIFIED TYPE: ICD-10-CM

## 2022-01-12 DIAGNOSIS — F33.0 MILD EPISODE OF RECURRENT MAJOR DEPRESSIVE DISORDER: ICD-10-CM

## 2022-01-12 DIAGNOSIS — Z79.899 HIGH RISK MEDICATION USE: ICD-10-CM

## 2022-01-12 LAB
AMPHET+METHAMPHET UR QL: POSITIVE
AMPHETAMINES UR QL: NEGATIVE
BARBITURATES UR QL SCN: NEGATIVE
BENZODIAZ UR QL SCN: NEGATIVE
BUPRENORPHINE SERPL-MCNC: NEGATIVE NG/ML
CANNABINOIDS SERPL QL: NEGATIVE
COCAINE UR QL: NEGATIVE
EXPIRATION DATE: ABNORMAL
Lab: ABNORMAL
MDMA UR QL SCN: NEGATIVE
METHADONE UR QL SCN: NEGATIVE
OPIATES UR QL: NEGATIVE
OXYCODONE UR QL SCN: NEGATIVE
PCP UR QL SCN: NEGATIVE

## 2022-01-12 PROCEDURE — 80305 DRUG TEST PRSMV DIR OPT OBS: CPT | Performed by: FAMILY MEDICINE

## 2022-01-12 PROCEDURE — 99214 OFFICE O/P EST MOD 30 MIN: CPT | Performed by: FAMILY MEDICINE

## 2022-01-12 RX ORDER — OMEPRAZOLE 20 MG/1
20 CAPSULE, DELAYED RELEASE ORAL DAILY
COMMUNITY
End: 2022-07-27

## 2022-01-12 NOTE — PROGRESS NOTES
Chief Complaint  ADHD (3 month follow up)    Subjective          Corey Velasquez presents to [facility] today for routine f/u of chronic issues.    He is on Adderall XR 30 mg daily for ADD.  He has been stable on this regimen for many, many years.  Symptoms of focus and concentration have been well controlled.    No problems with adverse effects.  He was originally diagnosed by Dr. Zeng many years ago.  He does not take drug holidays.  He needs to switch to generic Adderall with his next rx.      He is on metoprolol succinate for hypertension.    His blood pressure has been well controlled at home.    No chest pain, shortness of breath or palpitations.  He is following with Cardiology.      He is on fluoxetine for depression.  Symptoms are well controlled.  No depression or anhedonia, anxiety or panic attacks.     He has been having trouble with swallowing food intermittently for some time but much more frequently over the past 3 weeks.  The spells come more frequently since that time.  It can happen anytime of day but is worst in the evening.  He has pretty much stopped eating dinner for this reason.  When eating, he feels the bolus stop at the bottom of the sternum.  Once he notes that sensation, he is unable to swallow even fluids and get them to pass.  He has to make himself throw up to get relief.  He takes Prilosec OTC every day.    He quit smoking in 2012.  He smoked 35 years prior, 1 ppd.  No lung cancer history in the family.      Current Outpatient Medications:   •  Adderall XR 30 MG 24 hr capsule, Take 1 capsule by mouth Every Morning, Disp: 30 capsule, Rfl: 0  •  FLUoxetine (PROzac) 40 MG capsule, Take 1 capsule by mouth Daily., Disp: 90 capsule, Rfl: 1  •  metoprolol succinate XL (TOPROL-XL) 25 MG 24 hr tablet, TAKE ONE TABLET BY MOUTH DAILY (Patient taking differently: Take 25 mg by mouth Daily.), Disp: 90 tablet, Rfl: 2  •  omeprazole (priLOSEC) 20 MG capsule, Take  "20 mg by mouth Daily., Disp: , Rfl:     Allergies:  Patient has no known allergies.      Objective   Vital Signs:   /80 (BP Location: Left arm, Patient Position: Sitting)   Pulse 120   Temp 98.7 °F (37.1 °C) (Oral)   Ht 170.2 cm (67\")   Wt 95.3 kg (210 lb)   BMI 32.89 kg/m²     Physical Exam  Vitals reviewed.   Constitutional:       General: He is not in acute distress.     Appearance: Normal appearance. He is well-developed.   HENT:      Head: Normocephalic and atraumatic.      Right Ear: External ear normal.      Left Ear: External ear normal.      Nose: Nose normal.      Mouth/Throat:      Mouth: Mucous membranes are moist.   Eyes:      Extraocular Movements: Extraocular movements intact.      Conjunctiva/sclera: Conjunctivae normal.      Pupils: Pupils are equal, round, and reactive to light.   Cardiovascular:      Rate and Rhythm: Normal rate and regular rhythm.      Heart sounds: No murmur heard.      Pulmonary:      Effort: Pulmonary effort is normal.      Breath sounds: Normal breath sounds. No wheezing, rhonchi or rales.   Abdominal:      General: Bowel sounds are normal. There is no distension.      Palpations: Abdomen is soft.      Tenderness: There is no abdominal tenderness.   Musculoskeletal:         General: Normal range of motion.   Neurological:      Mental Status: He is alert.   Psychiatric:         Mood and Affect: Affect normal.             Assessment and Plan    Diagnoses and all orders for this visit:    1. Attention deficit disorder (ADD) without hyperactivity (Primary)  Assessment & Plan:  Stable on current regimen.  Symptoms are well controlled.  He does need the Adderall prescription changed to generic Adderall on his next refill.  No adverse effects. He does require ongoing use of this controlled substance to function.  Tox screen was due today.  Prior tox screen appropriate.  Christopher was run today.  Refills were not needed today.  RTC 3 months.        2. High risk medication " use  -     POC Urine Drug Screen Premier Bio-Cup    3. Hypertension, essential  Assessment & Plan:  He does monitor blood pressure regularly at home and it has been running at goal below 140/90.  No changes to his metoprolol succinate today.  Continue 25 mg daily.  No refills needed.  Labs are reviewed and up-to-date.      4. Mild episode of recurrent major depressive disorder (HCC)  Assessment & Plan:  Stable on fluoxetine 40 mg daily.  No refills needed today.      5. Dysphagia, unspecified type  Assessment & Plan:  He is having issues with dysphagia, worsened for the past 3 weeks.  It is gotten to the point where he is hardly eating dinner at all because it is worse in the evenings.  He feels a bolus of food that goes down into the chest to get stuck at the base of the sternum.  Even water bothers him once he feels that bolus get stuck, so he has to throw up the food to find some relief.  I am going to send him for swallow study.  Taking some daily Prilosec over-the-counter.    Orders:  -     FL Video Swallow With Speech Single Contrast; Future    6. Personal history of tobacco use, presenting hazards to health  Comments:  Is interested in pursuing low-dose CT chest for lung cancer screening later but not right now due to current COVID wave.      Follow Up   Return in about 3 months (around 4/12/2022) for Recheck.  Patient was given instructions and counseling regarding his condition or for health maintenance advice. Please see specific information pulled into the AVS if appropriate.

## 2022-01-12 NOTE — ASSESSMENT & PLAN NOTE
He does monitor blood pressure regularly at home and it has been running at goal below 140/90.  No changes to his metoprolol succinate today.  Continue 25 mg daily.  No refills needed.  Labs are reviewed and up-to-date.

## 2022-01-12 NOTE — ASSESSMENT & PLAN NOTE
Stable on current regimen.  Symptoms are well controlled.  He does need the Adderall prescription changed to generic Adderall on his next refill.  No adverse effects. He does require ongoing use of this controlled substance to function.  Tox screen was due today.  Prior tox screen appropriate.  Christopher was run today.  Refills were not needed today.  RTC 3 months.

## 2022-01-25 ENCOUNTER — HOSPITAL ENCOUNTER (OUTPATIENT)
Dept: GENERAL RADIOLOGY | Facility: HOSPITAL | Age: 67
Discharge: HOME | End: 2022-01-25
Admitting: FAMILY MEDICINE

## 2022-01-25 DIAGNOSIS — R13.10 DYSPHAGIA, UNSPECIFIED TYPE: ICD-10-CM

## 2022-01-25 PROCEDURE — 92611 MOTION FLUOROSCOPY/SWALLOW: CPT

## 2022-01-25 PROCEDURE — 74230 X-RAY XM SWLNG FUNCJ C+: CPT

## 2022-01-25 RX ADMIN — BARIUM SULFATE 55 ML: 0.81 POWDER, FOR SUSPENSION ORAL at 10:10

## 2022-01-25 RX ADMIN — BARIUM SULFATE 1 TEASPOON(S): 0.6 CREAM ORAL at 10:10

## 2022-01-25 NOTE — MBS/VFSS/FEES
Outpatient - Speech Language Pathology   Swallow Modified Barium Swallow  Alexis     Patient Name: Corey Velasquez  : 1955  MRN: [id number]  Today's Date: 2022               Admit Date: 2022    Visit Dx:     ICD-10-CM ICD-9-CM   1. Dysphagia, unspecified type  R13.10 787.20     Patient Active Problem List   Diagnosis   • Hypertension, essential   • Attention deficit disorder (ADD) without hyperactivity   • High risk medication use   • Mild episode of recurrent major depressive disorder (HCC)   • Generalized osteoarthritis   • Chronic pain of left knee   • Encounter for long-term (current) use of medications   • Failed total knee arthroplasty (HCC)   • Other fatigue   • Iron deficiency anemia   • Dysphagia     Past Medical History:   Diagnosis Date   • ADD (attention deficit disorder)     PREDOMINANTLY INATTENTIVE TYPE   • Anemia, unspecified    • Anesthesia complication     DIFFICULT TO AROUSE AFTER GALLBLADDER SURGERY 10-12 YEARS AGO, HAS HAD SURGERY SINCE WITHOUT DIFFICULTY   • Cough    • Depression     Major depressive disorder, single episode, mild   • Essential (primary) hypertension    • Knee pain, left    • Limited mobility     LEFT KNEE   • Other hydrocele    • Tachycardia, unspecified    • Unilateral primary osteoarthritis, left knee    • Weakness     LEFT KNEE     Past Surgical History:   Procedure Laterality Date   • CATARACT EXTRACTION, BILATERAL     • CHOLECYSTECTOMY     • COLONOSCOPY      ; NEG   • ENDOSCOPY      GASTRITIS   • JOINT REPLACEMENT Left 2016    TKA   • ORTHOPEDIC SURGERY Right     HAND   • REVISION TOTAL KNEE ARTHROPLASTY Left    • TONSILLECTOMY AND ADENOIDECTOMY     • TOTAL KNEE ARTHROPLASTY REVISION Left 2021    Procedure: LEFT TOTAL KNEE  REVISION;  Surgeon: Jan Kruse MD;  Location: [facility];  Service: Orthopedics;  Laterality: Left;     PERTINENT INFORMATION:  Patient is a 67 year old white male referred for modified barium swallow  study to rule out aspiration.  Patient reports retrosternal sensation of solids sticking and difficulty initiating swallow with thin liquids..    Patient was referred for an MBSS by Dr. Higginbotham to rule out aspiration as well as to determine appropriate treatment plan for this patient.      PROCEDURE:    Patient was alert and cooperative.  The patient was viewed in lateral projection.  The following Ba consistencies were administered: Full range of consistencies with thin liquids from spoon cup and straw, purée consistencies soft and hard solids..  The following compensatory swallowing strategies were performed: N/A      RESULTS:    1.  Oral stage is characterized by good bolus preparation control.  Timely oral transit.  Pharyngeal phase is timely with good laryngeal elevation, base of tongue retraction and epiglottic retroflexion.  No penetration or aspiration noted with any trial presented.  No pharyngeal residue noted after the swallow.  A screening esophageal sweep shows slow bolus clearance through the esophagus per radiologist report.      IMPRESSIONS:    Patient demonstrated functional oropharyngeal swallow negative for penetration or aspiration or pharyngeal residue.  A screening esophageal sweep shows slow bolus clearance through the esophagus.  Patient could benefit from dedicated esophagram for further work-up of potential esophageal dysfunction.      FUNCTIONAL DEFICIT: Patient scored level 7 of 7 on Functional Communication Measures for swallowing indicating a 0% limitation in function for current status, goal status, and discharge status.      RECOMMENDATIONS:   1.  Regular diet-thin liquids  2.  Strict reflux precautions  3.  May benefit from an esophagram to further assess potential esophageal dysfunction      Yes patient/responsible party agrees with the plan of care and has been informed of all alternatives, risks and benefits.    Thank you for this referral.      EDUCATION  The patient has been  educated in the following areas:   Dysphagia (Swallowing Impairment).          Daniela Siddiqi, SLP  1/25/2022

## 2022-01-28 ENCOUNTER — TELEPHONE (OUTPATIENT)
Dept: FAMILY MEDICINE CLINIC | Age: 67
End: 2022-01-28

## 2022-01-28 NOTE — TELEPHONE ENCOUNTER
Caller: Corey Velasquez    Relationship to patient: Self    Best call back number: [phone]    Patient is needing: PATIENT IS RETURNING [facility] CALL. PLEASE CALL AND ADVISE.

## 2022-02-01 DIAGNOSIS — R13.19 ESOPHAGEAL DYSPHAGIA: Primary | ICD-10-CM

## 2022-02-02 DIAGNOSIS — F98.8 ATTENTION DEFICIT DISORDER (ADD) WITHOUT HYPERACTIVITY: ICD-10-CM

## 2022-02-03 RX ORDER — DEXTROAMPHETAMINE SULFATE, DEXTROAMPHETAMINE SACCHARATE, AMPHETAMINE SULFATE AND AMPHETAMINE ASPARTATE 7.5; 7.5; 7.5; 7.5 MG/1; MG/1; MG/1; MG/1
30 CAPSULE, EXTENDED RELEASE ORAL EVERY MORNING
Qty: 30 CAPSULE | Refills: 0 | Status: SHIPPED | OUTPATIENT
Start: 2022-02-03 | End: 2022-02-08 | Stop reason: SDUPTHER

## 2022-02-07 ENCOUNTER — TELEPHONE (OUTPATIENT)
Dept: FAMILY MEDICINE CLINIC | Age: 67
End: 2022-02-07

## 2022-02-07 DIAGNOSIS — F98.8 ATTENTION DEFICIT DISORDER (ADD) WITHOUT HYPERACTIVITY: Primary | ICD-10-CM

## 2022-02-07 RX ORDER — DEXTROAMPHETAMINE SACCHARATE, AMPHETAMINE ASPARTATE MONOHYDRATE, DEXTROAMPHETAMINE SULFATE AND AMPHETAMINE SULFATE 7.5; 7.5; 7.5; 7.5 MG/1; MG/1; MG/1; MG/1
30 CAPSULE, EXTENDED RELEASE ORAL EVERY MORNING
COMMUNITY
End: 2022-02-07 | Stop reason: SDUPTHER

## 2022-02-07 NOTE — TELEPHONE ENCOUNTER
Caller: HUMANPATRIZIA    Relationship: MAY Rolle call back number: PATIENT - [phone]    Requested Prescriptions:   Requested Prescriptions      No prescriptions requested or ordered in this encounter      DEXTROAMPHETAMINE ER 30 MG    Pharmacy where request should be sent: [facility], [address] [phone]  - [phone] FX     Additional details provided by patient: MAY REPORTS THAT THE GENERIC FOR ADDERALL WOULD BE COVERED AND NOT REQUIRE A PRIOR AUTH    PATIENT HAS BEEN OUT OF MEDICATION FOR 6 DAYS    Does the patient have less than a 3 day supply:  [x] Yes  [] No    Jeanmarie Fong Rep   02/07/22 11:06 EST

## 2022-02-07 NOTE — TELEPHONE ENCOUNTER
Pt needs new rx for generic Adderall XR sent to pharm. The other rx was BNO and insurance will not cover it. Please send.

## 2022-02-08 RX ORDER — DEXTROAMPHETAMINE SACCHARATE, AMPHETAMINE ASPARTATE MONOHYDRATE, DEXTROAMPHETAMINE SULFATE AND AMPHETAMINE SULFATE 7.5; 7.5; 7.5; 7.5 MG/1; MG/1; MG/1; MG/1
30 CAPSULE, EXTENDED RELEASE ORAL EVERY MORNING
Qty: 30 CAPSULE | Refills: 0 | Status: SHIPPED | OUTPATIENT
Start: 2022-02-08 | End: 2022-03-08 | Stop reason: SDUPTHER

## 2022-02-16 NOTE — ASSESSMENT & PLAN NOTE
He is having issues with dysphagia, worsened for the past 3 weeks.  It is gotten to the point where he is hardly eating dinner at all because it is worse in the evenings.  He feels a bolus of food that goes down into the chest to get stuck at the base of the sternum.  Even water bothers him once he feels that bolus get stuck, so he has to throw up the food to find some relief.  I am going to send him for swallow study.  Taking some daily Prilosec over-the-counter.   calm

## 2022-02-28 ENCOUNTER — HOSPITAL ENCOUNTER (OUTPATIENT)
Dept: HOSPITAL 49 - FAS | Age: 67
Discharge: HOME | End: 2022-02-28
Attending: STUDENT IN AN ORGANIZED HEALTH CARE EDUCATION/TRAINING PROGRAM
Payer: COMMERCIAL

## 2022-02-28 VITALS — BODY MASS INDEX: 32.17 KG/M2 | WEIGHT: 204.99 LBS | HEIGHT: 67 IN

## 2022-02-28 DIAGNOSIS — K22.2: Primary | ICD-10-CM

## 2022-02-28 DIAGNOSIS — K44.9: ICD-10-CM

## 2022-02-28 DIAGNOSIS — K29.70: ICD-10-CM

## 2022-02-28 DIAGNOSIS — Z96.652: ICD-10-CM

## 2022-02-28 DIAGNOSIS — Z88.8: ICD-10-CM

## 2022-02-28 DIAGNOSIS — K22.89: ICD-10-CM

## 2022-02-28 DIAGNOSIS — I10: ICD-10-CM

## 2022-02-28 DIAGNOSIS — Z87.891: ICD-10-CM

## 2022-02-28 DIAGNOSIS — K31.9: ICD-10-CM

## 2022-02-28 DIAGNOSIS — K22.10: ICD-10-CM

## 2022-02-28 DIAGNOSIS — Z88.7: ICD-10-CM

## 2022-02-28 DIAGNOSIS — K31.7: ICD-10-CM

## 2022-02-28 PROCEDURE — C1726 CATH, BAL DIL, NON-VASCULAR: HCPCS

## 2022-03-08 DIAGNOSIS — F98.8 ATTENTION DEFICIT DISORDER (ADD) WITHOUT HYPERACTIVITY: ICD-10-CM

## 2022-03-08 RX ORDER — DEXTROAMPHETAMINE SACCHARATE, AMPHETAMINE ASPARTATE MONOHYDRATE, DEXTROAMPHETAMINE SULFATE AND AMPHETAMINE SULFATE 7.5; 7.5; 7.5; 7.5 MG/1; MG/1; MG/1; MG/1
30 CAPSULE, EXTENDED RELEASE ORAL EVERY MORNING
Qty: 30 CAPSULE | Refills: 0 | Status: SHIPPED | OUTPATIENT
Start: 2022-03-08 | End: 2022-04-08 | Stop reason: SDUPTHER

## 2022-03-31 RX ORDER — METOPROLOL SUCCINATE 25 MG/1
TABLET, EXTENDED RELEASE ORAL
Qty: 90 TABLET | Refills: 0 | Status: SHIPPED | OUTPATIENT
Start: 2022-03-31 | End: 2022-07-05 | Stop reason: SDUPTHER

## 2022-04-08 ENCOUNTER — TELEPHONE (OUTPATIENT)
Dept: FAMILY MEDICINE CLINIC | Age: 67
End: 2022-04-08

## 2022-04-08 DIAGNOSIS — F98.8 ATTENTION DEFICIT DISORDER (ADD) WITHOUT HYPERACTIVITY: ICD-10-CM

## 2022-04-08 NOTE — TELEPHONE ENCOUNTER
Caller: Corey Velasquez    Relationship: Self    Best call back number:  [phone]    Requested Prescriptions:   Requested Prescriptions     Pending Prescriptions Disp Refills   • amphetamine-dextroamphetamine XR (ADDERALL XR) 30 MG 24 hr capsule 30 capsule 0     Sig: Take 1 capsule by mouth Every Morning        Pharmacy where request should be sent: [facility], [address] [phone]  - [phone] FX     Additional details provided by patient: PATIENT IS OUT OF MEDICATION    Does the patient have less than a 3 day supply:  [x] Yes  [] No    Jeanmarie Fong Rep   04/08/22 09:20 EDT

## 2022-04-08 NOTE — TELEPHONE ENCOUNTER
LF 3/8/22. Last tox 1/12/22  Sent to Dr Gamino on call for [facility].

## 2022-04-09 RX ORDER — DEXTROAMPHETAMINE SACCHARATE, AMPHETAMINE ASPARTATE MONOHYDRATE, DEXTROAMPHETAMINE SULFATE AND AMPHETAMINE SULFATE 7.5; 7.5; 7.5; 7.5 MG/1; MG/1; MG/1; MG/1
30 CAPSULE, EXTENDED RELEASE ORAL EVERY MORNING
Qty: 30 CAPSULE | Refills: 0 | Status: SHIPPED | OUTPATIENT
Start: 2022-04-09 | End: 2022-05-10 | Stop reason: SDUPTHER

## 2022-04-14 ENCOUNTER — TELEPHONE (OUTPATIENT)
Dept: FAMILY MEDICINE CLINIC | Age: 67
End: 2022-04-14

## 2022-04-14 NOTE — TELEPHONE ENCOUNTER
Pt's wife called stating his EGD showed strictures and severe irritation.  Dr Giordano put him on carafate QID and also protnix BID, but he still wakes up in the early morning with severe abd pain/burning.  He will break out in a sweat and feel really bad.  Is there anything else he can take?  I made him an appt for next week.  Please advise

## 2022-04-15 NOTE — TELEPHONE ENCOUNTER
The Protonix BID and sucrafate QID is really the best regimen we have for gastritis, unfortunately (it is actually what we use for people who have bleeding ulcers).  Is there any particular food that seems to trigger it, so he could avoid that?  I'm sorry -- I wish I had something else to recommend.  I know it is so miserable waiting for that stomach lining to heal back up.  Make sure he is taking the sucralfate every 6 hours to keep the stomach coated, and hopefully he will start to improve soon.  Is he having any nausea?  I could send him in some Zofran for that if so.  Thanks, MARYELLEN

## 2022-04-19 ENCOUNTER — OFFICE VISIT (OUTPATIENT)
Dept: FAMILY MEDICINE CLINIC | Age: 67
End: 2022-04-19

## 2022-04-19 VITALS
WEIGHT: 204 LBS | HEART RATE: 96 BPM | SYSTOLIC BLOOD PRESSURE: 160 MMHG | BODY MASS INDEX: 32.02 KG/M2 | DIASTOLIC BLOOD PRESSURE: 92 MMHG | HEIGHT: 67 IN

## 2022-04-19 DIAGNOSIS — F33.0 MILD EPISODE OF RECURRENT MAJOR DEPRESSIVE DISORDER: ICD-10-CM

## 2022-04-19 DIAGNOSIS — F98.8 ATTENTION DEFICIT DISORDER (ADD) WITHOUT HYPERACTIVITY: ICD-10-CM

## 2022-04-19 DIAGNOSIS — K22.70 BARRETT'S ESOPHAGUS WITHOUT DYSPLASIA: ICD-10-CM

## 2022-04-19 DIAGNOSIS — I10 HYPERTENSION, ESSENTIAL: ICD-10-CM

## 2022-04-19 DIAGNOSIS — K29.00 ACUTE SUPERFICIAL GASTRITIS WITHOUT HEMORRHAGE: Primary | ICD-10-CM

## 2022-04-19 DIAGNOSIS — Z79.899 ENCOUNTER FOR LONG-TERM (CURRENT) USE OF MEDICATIONS: ICD-10-CM

## 2022-04-19 PROCEDURE — 80305 DRUG TEST PRSMV DIR OPT OBS: CPT | Performed by: FAMILY MEDICINE

## 2022-04-19 PROCEDURE — 99214 OFFICE O/P EST MOD 30 MIN: CPT | Performed by: FAMILY MEDICINE

## 2022-04-19 RX ORDER — SUCRALFATE 1 G/1
TABLET ORAL 4 TIMES DAILY
COMMUNITY
Start: 2022-04-08 | End: 2022-07-05

## 2022-04-19 RX ORDER — PANTOPRAZOLE SODIUM 40 MG/1
TABLET, DELAYED RELEASE ORAL 2 TIMES DAILY
COMMUNITY
Start: 2022-04-08 | End: 2022-08-16

## 2022-04-19 NOTE — ASSESSMENT & PLAN NOTE
Continue pantoprazole twice daily and sucralfate 4 times daily.  He does not need any Zofran or antiemetics at this time.  He is going back to Dr. Tigre Feldman in June for repeat scope.  He was diagnosed with Clark's as well as gastritis on his recent EGD.  Things are slowly getting better.

## 2022-04-19 NOTE — PROGRESS NOTES
Chief Complaint  Nausea (Nausea + abdominal pain X 2 months )    Subjective          Corey Velasquez presents to [facility] today for f/u on nausea and abdominal pain.    Zenobia called in to the office last week to let us know that mom's recent EGD with Dr. Tigre Feldman showed strictures and severe gastritis.  Also diagnosed with Clark's esophagus.  Dr. Feldman started him on Protonix 40 mg twice daily as well as Carafate 4 times daily.  However despite this he is continuing to have severe abdominal pain and burning.  Sx are worst in the early morning, but he is doing better once he gets up and getting moving.    He is going back for a repeat scope in June.  He has been using cannabis gummies for his stomach since the scope.      He is on Adderall XR for ADD.  He has been stable on this regimen for over a decade now.   Concentration and focus have been well controlled.   He denies adverse effects.  He was originally diagnosed by Dr. Zeng Psychiatry many years ago.  He does not take drug holidays.      He is on metoprolol succinate for  HTN.   BP has been well controlled at home.   He does have a bit of whitecoat hypertension and is usually running high when he is here.    He denies chest pain, shortness of air or palpitations.  Follows with Cardiology.      He is on Prozac for depression. Sx are well controlled.  He denies depressed mood or anhedonia, anxiety or panic attacks.    He saw the orthopedist and got an injection in the R knee.  That has worked great.  He also had a trigger finger release.        Current Outpatient Medications:   •  amphetamine-dextroamphetamine XR (ADDERALL XR) 30 MG 24 hr capsule, Take 1 capsule by mouth Every Morning, Disp: 30 capsule, Rfl: 0  •  FLUoxetine (PROzac) 40 MG capsule, Take 1 capsule by mouth Daily., Disp: 90 capsule, Rfl: 1  •  metoprolol succinate XL (TOPROL-XL) 25 MG 24 hr tablet, TAKE 1 TABLET BY MOUTH ONCE DAILY, Disp: 90 tablet,  "Rfl: 0  •  omeprazole (priLOSEC) 20 MG capsule, Take 20 mg by mouth Daily., Disp: , Rfl:   •  pantoprazole (PROTONIX) 40 MG EC tablet, 2 (Two) Times a Day., Disp: , Rfl:   •  sucralfate (CARAFATE) 1 g tablet, 4 (Four) Times a Day., Disp: , Rfl:     Allergies:  Patient has no known allergies.      Objective   Vital Signs:   Vitals:    04/19/22 1324   BP: 160/92   BP Location: Left arm   Patient Position: Sitting   Pulse: 96   Weight: 92.5 kg (204 lb)   Height: 170.2 cm (67\")       Physical Exam  Vitals reviewed.   Constitutional:       General: He is not in acute distress.     Appearance: Normal appearance. He is well-developed.   HENT:      Head: Normocephalic and atraumatic.      Right Ear: External ear normal.      Left Ear: External ear normal.   Eyes:      Extraocular Movements: Extraocular movements intact.      Conjunctiva/sclera: Conjunctivae normal.      Pupils: Pupils are equal, round, and reactive to light.   Cardiovascular:      Rate and Rhythm: Normal rate and regular rhythm.      Heart sounds: No murmur heard.  Pulmonary:      Effort: Pulmonary effort is normal.      Breath sounds: Normal breath sounds. No wheezing, rhonchi or rales.   Abdominal:      General: Bowel sounds are normal. There is no distension.      Palpations: Abdomen is soft.      Tenderness: There is abdominal tenderness (epigastric > LUQ).   Musculoskeletal:         General: Normal range of motion.   Neurological:      Mental Status: He is alert.   Psychiatric:         Mood and Affect: Affect normal.             Assessment and Plan    Diagnoses and all orders for this visit:    1. Acute superficial gastritis without hemorrhage (Primary)  Assessment & Plan:  Continue pantoprazole twice daily and sucralfate 4 times daily.  He does not need any Zofran or antiemetics at this time.  He is going back to Dr. Tigre Feldman in June for repeat scope.  He was diagnosed with Clark's as well as gastritis on his recent EGD.  Things are slowly " getting better.      2. Clark's esophagus without dysplasia  Assessment & Plan:  As per gastritis plan.      3. Attention deficit disorder (ADD) without hyperactivity  Assessment & Plan:  Stable on current regimen.  Symptoms are well controlled.  No adverse effects. He does require ongoing use of this controlled substance to function.  Tox screen was due today.  Prior tox screen appropriate.  Christopher was run today.  Refills were not needed today.  RTC 3 months.        4. Encounter for long-term (current) use of medications  -     POC Urine Drug Screen Premier Bio-Cup    5. Hypertension, essential  Assessment & Plan:  Stable on metoprolol succinate 25 mg daily.  No refills needed.  Labs reviewed and up-to-date.      6. Mild episode of recurrent major depressive disorder (HCC)  Assessment & Plan:  Stable on fluoxetine 40 mg daily.  No refills needed.  Continue to monitor.        Follow Up   Return for or as scheduled 5/4/22.  Patient was given instructions and counseling regarding his condition or for health maintenance advice. Please see specific information pulled into the AVS if appropriate.

## 2022-04-29 ENCOUNTER — PATIENT MESSAGE (OUTPATIENT)
Dept: FAMILY MEDICINE CLINIC | Age: 67
End: 2022-04-29

## 2022-04-29 RX ORDER — ONDANSETRON 4 MG/1
4 TABLET, ORALLY DISINTEGRATING ORAL EVERY 8 HOURS PRN
Qty: 30 TABLET | Refills: 0 | Status: SHIPPED | OUTPATIENT
Start: 2022-04-29 | End: 2022-07-05 | Stop reason: SDUPTHER

## 2022-04-29 NOTE — TELEPHONE ENCOUNTER
From: Corey Velasquez  To: Cleopatra Higginbotham MD  Sent: 4/29/2022 10:42 AM EDT  Subject: Zofran    On my last visit the  was going to give me prescription for Zofran for nausea and I thought had enough but I didn’t could you ask her if she would order some Zofran from [facility] pharmacy in [place] thank you so much.

## 2022-05-10 DIAGNOSIS — F98.8 ATTENTION DEFICIT DISORDER (ADD) WITHOUT HYPERACTIVITY: ICD-10-CM

## 2022-05-10 RX ORDER — DEXTROAMPHETAMINE SACCHARATE, AMPHETAMINE ASPARTATE MONOHYDRATE, DEXTROAMPHETAMINE SULFATE AND AMPHETAMINE SULFATE 7.5; 7.5; 7.5; 7.5 MG/1; MG/1; MG/1; MG/1
30 CAPSULE, EXTENDED RELEASE ORAL EVERY MORNING
Qty: 30 CAPSULE | Refills: 0 | Status: SHIPPED | OUTPATIENT
Start: 2022-05-10 | End: 2022-06-08 | Stop reason: SDUPTHER

## 2022-06-08 DIAGNOSIS — F98.8 ATTENTION DEFICIT DISORDER (ADD) WITHOUT HYPERACTIVITY: ICD-10-CM

## 2022-06-10 RX ORDER — DEXTROAMPHETAMINE SACCHARATE, AMPHETAMINE ASPARTATE MONOHYDRATE, DEXTROAMPHETAMINE SULFATE AND AMPHETAMINE SULFATE 7.5; 7.5; 7.5; 7.5 MG/1; MG/1; MG/1; MG/1
30 CAPSULE, EXTENDED RELEASE ORAL EVERY MORNING
Qty: 30 CAPSULE | Refills: 0 | Status: SHIPPED | OUTPATIENT
Start: 2022-06-10 | End: 2022-07-05 | Stop reason: SDUPTHER

## 2022-06-20 ENCOUNTER — HOSPITAL ENCOUNTER (OUTPATIENT)
Dept: HOSPITAL 49 - FAS | Age: 67
Discharge: HOME | End: 2022-06-20
Attending: STUDENT IN AN ORGANIZED HEALTH CARE EDUCATION/TRAINING PROGRAM
Payer: COMMERCIAL

## 2022-06-20 VITALS — WEIGHT: 204.99 LBS | HEIGHT: 67 IN | BODY MASS INDEX: 32.17 KG/M2

## 2022-06-20 DIAGNOSIS — I10: ICD-10-CM

## 2022-06-20 DIAGNOSIS — K29.50: ICD-10-CM

## 2022-06-20 DIAGNOSIS — K44.9: ICD-10-CM

## 2022-06-20 DIAGNOSIS — Z88.8: ICD-10-CM

## 2022-06-20 DIAGNOSIS — Z87.891: ICD-10-CM

## 2022-06-20 DIAGNOSIS — Z72.89: ICD-10-CM

## 2022-06-20 DIAGNOSIS — K31.9: ICD-10-CM

## 2022-06-20 DIAGNOSIS — K22.70: Primary | ICD-10-CM

## 2022-06-20 DIAGNOSIS — K21.00: ICD-10-CM

## 2022-06-20 DIAGNOSIS — Z79.899: ICD-10-CM

## 2022-07-05 ENCOUNTER — TELEMEDICINE (OUTPATIENT)
Dept: FAMILY MEDICINE CLINIC | Age: 67
End: 2022-07-05

## 2022-07-05 DIAGNOSIS — U07.1 COVID-19 VIRUS INFECTION: Primary | ICD-10-CM

## 2022-07-05 DIAGNOSIS — K29.00 ACUTE SUPERFICIAL GASTRITIS WITHOUT HEMORRHAGE: ICD-10-CM

## 2022-07-05 DIAGNOSIS — I10 HYPERTENSION, ESSENTIAL: ICD-10-CM

## 2022-07-05 DIAGNOSIS — F98.8 ATTENTION DEFICIT DISORDER (ADD) WITHOUT HYPERACTIVITY: ICD-10-CM

## 2022-07-05 DIAGNOSIS — F33.0 MILD EPISODE OF RECURRENT MAJOR DEPRESSIVE DISORDER: ICD-10-CM

## 2022-07-05 PROCEDURE — 99214 OFFICE O/P EST MOD 30 MIN: CPT | Performed by: FAMILY MEDICINE

## 2022-07-05 RX ORDER — ONDANSETRON 4 MG/1
4 TABLET, ORALLY DISINTEGRATING ORAL EVERY 8 HOURS PRN
Qty: 30 TABLET | Refills: 0 | Status: SHIPPED | OUTPATIENT
Start: 2022-07-05 | End: 2022-11-09

## 2022-07-05 RX ORDER — METOPROLOL SUCCINATE 25 MG/1
25 TABLET, EXTENDED RELEASE ORAL DAILY
Qty: 90 TABLET | Refills: 1 | Status: SHIPPED | OUTPATIENT
Start: 2022-07-05 | End: 2023-01-06 | Stop reason: SDUPTHER

## 2022-07-05 RX ORDER — DEXTROAMPHETAMINE SACCHARATE, AMPHETAMINE ASPARTATE MONOHYDRATE, DEXTROAMPHETAMINE SULFATE AND AMPHETAMINE SULFATE 7.5; 7.5; 7.5; 7.5 MG/1; MG/1; MG/1; MG/1
30 CAPSULE, EXTENDED RELEASE ORAL EVERY MORNING
Qty: 30 CAPSULE | Refills: 0 | Status: SHIPPED | OUTPATIENT
Start: 2022-07-05 | End: 2022-08-10 | Stop reason: SDUPTHER

## 2022-07-05 RX ORDER — FLUOXETINE HYDROCHLORIDE 40 MG/1
40 CAPSULE ORAL DAILY
Qty: 90 CAPSULE | Refills: 1 | Status: SHIPPED | OUTPATIENT
Start: 2022-07-05 | End: 2023-01-26 | Stop reason: SDUPTHER

## 2022-07-05 NOTE — ASSESSMENT & PLAN NOTE
Symptoms for the past 3 days.  Home test positive for COVID. GFR 99.  Med interactions include:  none.     Herrera does qualify for Paxlovid.  Paxlovid is approved under EUA or emergency use authorization by the FDA for COVID.  This means that it has not fully completed all of the approval processes but because of the excellent effects that has shown in treating people who have COVID, they have chosen to make it available before the final processes have been cleared.  It does a very good job of keeping people out of the hospital if they are not already sick enough to be admitted.  He does qualify based on risk factors including:  age.  He will take 3 tabs daily for 5 days.  Rx will be sent to [facility] in [place].   Symptomatic care recommended with OTC analgesics for pain/fever, OTC decongestants and cough suppressants prn.  Stay well hydrated.  Humidifier in the bedroom at night.  Hot tea with lemon and honey. According to CDC guidelines, he should quarantine for until a total of 5 days since symptoms began have passed AND he has gone 24 hours being fever-free without medications AND his other symptoms are improving.  If all of these criteria are met, then he may come out of quarantine as long as he wears a well-fitting mask in public for another 5 days.  He should contact us or return to clinic if his sx worsen, especially if he develops chest pain, shortness of breath, fevers that do not respond to medications, uncontrollable vomiting, or significant swelling and/or pain in one leg.

## 2022-07-27 ENCOUNTER — OFFICE VISIT (OUTPATIENT)
Dept: FAMILY MEDICINE CLINIC | Age: 67
End: 2022-07-27

## 2022-07-27 VITALS
WEIGHT: 208 LBS | SYSTOLIC BLOOD PRESSURE: 134 MMHG | HEART RATE: 93 BPM | BODY MASS INDEX: 32.65 KG/M2 | HEIGHT: 67 IN | DIASTOLIC BLOOD PRESSURE: 88 MMHG | TEMPERATURE: 98.8 F

## 2022-07-27 DIAGNOSIS — Z79.899 ENCOUNTER FOR LONG-TERM (CURRENT) USE OF MEDICATIONS: ICD-10-CM

## 2022-07-27 DIAGNOSIS — F33.0 MILD EPISODE OF RECURRENT MAJOR DEPRESSIVE DISORDER: ICD-10-CM

## 2022-07-27 DIAGNOSIS — K22.70 BARRETT'S ESOPHAGUS WITHOUT DYSPLASIA: ICD-10-CM

## 2022-07-27 DIAGNOSIS — F98.8 ATTENTION DEFICIT DISORDER (ADD) WITHOUT HYPERACTIVITY: Primary | ICD-10-CM

## 2022-07-27 DIAGNOSIS — I10 HYPERTENSION, ESSENTIAL: ICD-10-CM

## 2022-07-27 DIAGNOSIS — K29.00 ACUTE SUPERFICIAL GASTRITIS WITHOUT HEMORRHAGE: ICD-10-CM

## 2022-07-27 PROBLEM — Z86.16 HISTORY OF COVID-19: Status: ACTIVE | Noted: 2022-07-05

## 2022-07-27 PROBLEM — R13.10 DYSPHAGIA: Status: RESOLVED | Noted: 2022-01-12 | Resolved: 2022-07-27

## 2022-07-27 PROCEDURE — 80305 DRUG TEST PRSMV DIR OPT OBS: CPT | Performed by: FAMILY MEDICINE

## 2022-07-27 PROCEDURE — 99214 OFFICE O/P EST MOD 30 MIN: CPT | Performed by: FAMILY MEDICINE

## 2022-07-27 NOTE — ASSESSMENT & PLAN NOTE
Stomach looked much better on repeat scope when he went back to Dr. Giordano in June.  Dr. Feldman did say that he wanted him to remain on the twice daily dosing for a little bit longer.  We are going to continue twice daily dosing on the Protonix for the next 3 months and then plan to take him down to once daily in October.  Symptomatically, he is doing much better.

## 2022-07-27 NOTE — ASSESSMENT & PLAN NOTE
Stable on current regimen.  Symptoms are well controlled.  No adverse effects. He does require ongoing use of this controlled substance to function.  Tox screen was due today.  Prior tox screen appropriate.  ANGEL was run today.  Refills were not needed today.  RTC 3 months.

## 2022-07-27 NOTE — PROGRESS NOTES
"Chief Complaint  Acute gastritis follow up (3 month follow up)    Subjective          Corey Velasquez presents to [facility] today for follow-up of chronic issues.    He is due for Prevnar-20 and COVID booster #2.    He was diagnosed with COVID earlier this month and was treated with Paxlovid at that time.  He noticed an immediate improvement in his sx once starting the Paxlovid.  He has recovered well.      He continues on Protonix twice daily with Zofran as needed for his gastritis and Clark's esophagus.  Recent EGD with Dr. Feldman showed strictures and severe gastritis as well as Clark's esophagus.  His stomach is doing much better.  He did go for the repeat scope in June and it looked like everything had cleared up.     He is on Adderall XR for ADD.  He has been stable for well over a decade now.  Symptoms of concentration and focus have been well controlled.  No adverse effects.  He was originally diagnosed by Dr. Zeng Psychiatry many years ago.  He does not take drug holidays.      He is on metoprolol succinate for hypertension.  His blood pressure has been well controlled at home.  No chest pain, palpitations or shortness of breath.  He does follow with Cardiology.      He is on fluoxetine for depression.  Mood has been \"good.\"  Symptoms are well controlled.  No depressed mood or anhedonia, anxiety or panic attacks.      Current Outpatient Medications:   •  amphetamine-dextroamphetamine XR (ADDERALL XR) 30 MG 24 hr capsule, Take 1 capsule by mouth Every Morning, Disp: 30 capsule, Rfl: 0  •  FLUoxetine (PROzac) 40 MG capsule, Take 1 capsule by mouth Daily., Disp: 90 capsule, Rfl: 1  •  metoprolol succinate XL (TOPROL-XL) 25 MG 24 hr tablet, Take 1 tablet by mouth Daily., Disp: 90 tablet, Rfl: 1  •  ondansetron ODT (Zofran ODT) 4 MG disintegrating tablet, Place 1 tablet on the tongue Every 8 (Eight) Hours As Needed for Nausea or Vomiting., Disp: 30 tablet, Rfl: 0  •  " "pantoprazole (PROTONIX) 40 MG EC tablet, 2 (Two) Times a Day., Disp: , Rfl:     Allergies:  Patient has no known allergies.      Objective   Vital Signs:   Vitals:    07/27/22 0815   BP: 134/88   BP Location: Right arm   Patient Position: Sitting   Pulse: 93   Temp: 98.8 °F (37.1 °C)   TempSrc: Oral   Weight: 94.3 kg (208 lb)   Height: 170.2 cm (67.01\")       Physical Exam  Vitals reviewed.   Constitutional:       General: He is not in acute distress.     Appearance: Normal appearance. He is well-developed.   HENT:      Head: Normocephalic and atraumatic.      Right Ear: External ear normal.      Left Ear: External ear normal.   Eyes:      Extraocular Movements: Extraocular movements intact.      Conjunctiva/sclera: Conjunctivae normal.      Pupils: Pupils are equal, round, and reactive to light.   Cardiovascular:      Rate and Rhythm: Normal rate and regular rhythm.      Heart sounds: No murmur heard.  Pulmonary:      Effort: Pulmonary effort is normal.      Breath sounds: Normal breath sounds. No wheezing, rhonchi or rales.   Abdominal:      General: Bowel sounds are normal. There is no distension.      Palpations: Abdomen is soft.      Tenderness: There is no abdominal tenderness.   Musculoskeletal:         General: Normal range of motion.   Neurological:      Mental Status: He is alert.   Psychiatric:         Mood and Affect: Affect normal.          Lab Results   Component Value Date    GLUCOSE 64 (L) 11/23/2021    BUN 7 (L) 11/23/2021    CREATININE 0.79 11/23/2021    EGFRIFNONA 99 11/23/2021    BCR 8.9 11/23/2021    K 4.8 11/23/2021    CO2 27.1 11/23/2021    CALCIUM 9.0 11/23/2021    ALBUMIN 3.80 11/23/2021    LABIL2 1.2 (L) 08/10/2020    AST 22 11/23/2021    ALT 22 11/23/2021       Lab Results   Component Value Date    CHOL 167 06/09/2021    CHLPL 173 08/10/2020    TRIG 83 06/09/2021    HDL 34 (L) 06/09/2021     (H) 06/09/2021            Assessment and Plan    Diagnoses and all orders for this " visit:    1. Attention deficit disorder (ADD) without hyperactivity (Primary)  Assessment & Plan:  Stable on current regimen.  Symptoms are well controlled.  No adverse effects. He does require ongoing use of this controlled substance to function.  Tox screen was due today.  Prior tox screen appropriate.  ANGEL was run today.  Refills were not needed today.  RTC 3 months.        2. Encounter for long-term (current) use of medications  -     POC Urine Drug Screen [facility]-Cup    3. Clark's esophagus without dysplasia  Assessment & Plan:  As per gastritis plan.      4. Acute superficial gastritis without hemorrhage  Assessment & Plan:  Stomach looked much better on repeat scope when he went back to Dr. Giordano in June.  Dr. Feldman did say that he wanted him to remain on the twice daily dosing for a little bit longer.  We are going to continue twice daily dosing on the Protonix for the next 3 months and then plan to take him down to once daily in October.  Symptomatically, he is doing much better.      5. Mild episode of recurrent major depressive disorder (HCC)  Assessment & Plan:  Stable on fluoxetine 40 mg daily.  Mood has been good.  Continue to monitor.  No refills needed.      6. Hypertension, essential  Assessment & Plan:  Stable metoprolol succinate 25 mg daily.  No refills needed.  Labs reviewed and up-to-date.        Follow Up   Return in about 3 months (around 10/27/2022) for Medicare Wellness.  Patient was given instructions and counseling regarding his condition or for health maintenance advice. Please see specific information pulled into the AVS if appropriate.

## 2022-08-04 ENCOUNTER — TELEPHONE (OUTPATIENT)
Dept: FAMILY MEDICINE CLINIC | Age: 67
End: 2022-08-04

## 2022-08-04 NOTE — TELEPHONE ENCOUNTER
----- Message from Cleopatra Higginbotham MD sent at 7/27/2022  9:42 AM EDT -----  Please let Herrera know that he can come in for a Prevnar 20 pneumonia shot.  We were out when he was at his appointment last week.  Thanks, BZHAYDEN

## 2022-08-10 DIAGNOSIS — F98.8 ATTENTION DEFICIT DISORDER (ADD) WITHOUT HYPERACTIVITY: ICD-10-CM

## 2022-08-10 RX ORDER — DEXTROAMPHETAMINE SACCHARATE, AMPHETAMINE ASPARTATE MONOHYDRATE, DEXTROAMPHETAMINE SULFATE AND AMPHETAMINE SULFATE 7.5; 7.5; 7.5; 7.5 MG/1; MG/1; MG/1; MG/1
30 CAPSULE, EXTENDED RELEASE ORAL EVERY MORNING
Qty: 30 CAPSULE | Refills: 0 | Status: SHIPPED | OUTPATIENT
Start: 2022-08-10 | End: 2022-09-07 | Stop reason: SDUPTHER

## 2022-08-10 NOTE — TELEPHONE ENCOUNTER
Caller: Corey Velasquez    Relationship: Self    Best call back number: [phone]    Requested Prescriptions:   Requested Prescriptions     Pending Prescriptions Disp Refills   • amphetamine-dextroamphetamine XR (ADDERALL XR) 30 MG 24 hr capsule 30 capsule 0     Sig: Take 1 capsule by mouth Every Morning        Pharmacy where request should be sent: [facility] - [facility], [address] [phone]  - [phone] FX     Additional details provided by patient: PATIENT IS COMPLETELY OUT OF MEDICATION    Does the patient have less than a 3 day supply:  [x] Yes  [] No    Jeanmarie Wagner Rep   08/10/22 10:05 EDT

## 2022-08-16 RX ORDER — PANTOPRAZOLE SODIUM 40 MG/1
TABLET, DELAYED RELEASE ORAL
Qty: 60 TABLET | Refills: 5 | Status: SHIPPED | OUTPATIENT
Start: 2022-08-16

## 2022-09-07 DIAGNOSIS — F98.8 ATTENTION DEFICIT DISORDER (ADD) WITHOUT HYPERACTIVITY: ICD-10-CM

## 2022-09-08 RX ORDER — DEXTROAMPHETAMINE SACCHARATE, AMPHETAMINE ASPARTATE MONOHYDRATE, DEXTROAMPHETAMINE SULFATE AND AMPHETAMINE SULFATE 7.5; 7.5; 7.5; 7.5 MG/1; MG/1; MG/1; MG/1
30 CAPSULE, EXTENDED RELEASE ORAL EVERY MORNING
Qty: 30 CAPSULE | Refills: 0 | Status: SHIPPED | OUTPATIENT
Start: 2022-09-08 | End: 2022-10-06 | Stop reason: SDUPTHER

## 2022-10-06 DIAGNOSIS — F98.8 ATTENTION DEFICIT DISORDER (ADD) WITHOUT HYPERACTIVITY: ICD-10-CM

## 2022-10-06 RX ORDER — DEXTROAMPHETAMINE SACCHARATE, AMPHETAMINE ASPARTATE MONOHYDRATE, DEXTROAMPHETAMINE SULFATE AND AMPHETAMINE SULFATE 7.5; 7.5; 7.5; 7.5 MG/1; MG/1; MG/1; MG/1
30 CAPSULE, EXTENDED RELEASE ORAL EVERY MORNING
Qty: 30 CAPSULE | Refills: 0 | Status: SHIPPED | OUTPATIENT
Start: 2022-10-06 | End: 2022-11-09 | Stop reason: SDUPTHER

## 2022-11-09 ENCOUNTER — OFFICE VISIT (OUTPATIENT)
Dept: FAMILY MEDICINE CLINIC | Age: 67
End: 2022-11-09

## 2022-11-09 ENCOUNTER — LAB (OUTPATIENT)
Dept: LAB | Facility: HOSPITAL | Age: 67
End: 2022-11-09

## 2022-11-09 VITALS
TEMPERATURE: 98.3 F | DIASTOLIC BLOOD PRESSURE: 88 MMHG | HEART RATE: 85 BPM | SYSTOLIC BLOOD PRESSURE: 149 MMHG | WEIGHT: 211.6 LBS | BODY MASS INDEX: 33.21 KG/M2 | HEIGHT: 67 IN

## 2022-11-09 DIAGNOSIS — G89.29 CHRONIC LEFT-SIDED LOW BACK PAIN WITH LEFT-SIDED SCIATICA: ICD-10-CM

## 2022-11-09 DIAGNOSIS — F98.8 ATTENTION DEFICIT DISORDER (ADD) WITHOUT HYPERACTIVITY: Chronic | ICD-10-CM

## 2022-11-09 DIAGNOSIS — Z23 ENCOUNTER FOR IMMUNIZATION: ICD-10-CM

## 2022-11-09 DIAGNOSIS — H92.02 LEFT EAR PAIN: ICD-10-CM

## 2022-11-09 DIAGNOSIS — I10 HYPERTENSION, ESSENTIAL: ICD-10-CM

## 2022-11-09 DIAGNOSIS — Z00.00 ANNUAL PHYSICAL EXAM: Primary | ICD-10-CM

## 2022-11-09 DIAGNOSIS — Z79.899 LONG-TERM USE OF HIGH-RISK MEDICATION: ICD-10-CM

## 2022-11-09 DIAGNOSIS — M54.42 CHRONIC LEFT-SIDED LOW BACK PAIN WITH LEFT-SIDED SCIATICA: ICD-10-CM

## 2022-11-09 LAB
ALBUMIN SERPL-MCNC: 4 G/DL (ref 3.5–5.2)
ALBUMIN/GLOB SERPL: 1.5 G/DL
ALP SERPL-CCNC: 233 U/L (ref 39–117)
ALT SERPL W P-5'-P-CCNC: 11 U/L (ref 1–41)
AMPHET+METHAMPHET UR QL: POSITIVE
AMPHETAMINES UR QL: NEGATIVE
ANION GAP SERPL CALCULATED.3IONS-SCNC: 7 MMOL/L (ref 5–15)
AST SERPL-CCNC: 17 U/L (ref 1–40)
BARBITURATES UR QL SCN: NEGATIVE
BENZODIAZ UR QL SCN: NEGATIVE
BILIRUB SERPL-MCNC: 0.4 MG/DL (ref 0–1.2)
BUN SERPL-MCNC: 9 MG/DL (ref 8–23)
BUN/CREAT SERPL: 11.1 (ref 7–25)
BUPRENORPHINE SERPL-MCNC: NEGATIVE NG/ML
CALCIUM SPEC-SCNC: 8.9 MG/DL (ref 8.6–10.5)
CANNABINOIDS SERPL QL: NEGATIVE
CHLORIDE SERPL-SCNC: 101 MMOL/L (ref 98–107)
CHOLEST SERPL-MCNC: 173 MG/DL (ref 0–200)
CO2 SERPL-SCNC: 28 MMOL/L (ref 22–29)
COCAINE UR QL: NEGATIVE
CREAT SERPL-MCNC: 0.81 MG/DL (ref 0.76–1.27)
EGFRCR SERPLBLD CKD-EPI 2021: 97.8 ML/MIN/1.73
EXPIRATION DATE: ABNORMAL
GLOBULIN UR ELPH-MCNC: 2.7 GM/DL
GLUCOSE SERPL-MCNC: 92 MG/DL (ref 65–99)
HDLC SERPL-MCNC: 43 MG/DL (ref 40–60)
LDLC SERPL CALC-MCNC: 116 MG/DL (ref 0–100)
LDLC/HDLC SERPL: 2.67 {RATIO}
Lab: ABNORMAL
MDMA UR QL SCN: NEGATIVE
METHADONE UR QL SCN: NEGATIVE
OPIATES UR QL: NEGATIVE
OXYCODONE UR QL SCN: NEGATIVE
PCP UR QL SCN: NEGATIVE
POTASSIUM SERPL-SCNC: 4.3 MMOL/L (ref 3.5–5.2)
PROT SERPL-MCNC: 6.7 G/DL (ref 6–8.5)
SODIUM SERPL-SCNC: 136 MMOL/L (ref 136–145)
TRIGL SERPL-MCNC: 76 MG/DL (ref 0–150)
VLDLC SERPL-MCNC: 14 MG/DL (ref 5–40)

## 2022-11-09 PROCEDURE — 80305 DRUG TEST PRSMV DIR OPT OBS: CPT | Performed by: FAMILY MEDICINE

## 2022-11-09 PROCEDURE — 91312 COVID-19 (PFIZER) BIVALENT BOOSTER 12+YRS: CPT | Performed by: FAMILY MEDICINE

## 2022-11-09 PROCEDURE — 1159F MED LIST DOCD IN RCRD: CPT | Performed by: FAMILY MEDICINE

## 2022-11-09 PROCEDURE — 0124A PR ADM SARSCOV2 30MCG/0.3ML BST: CPT | Performed by: FAMILY MEDICINE

## 2022-11-09 PROCEDURE — G0402 INITIAL PREVENTIVE EXAM: HCPCS | Performed by: FAMILY MEDICINE

## 2022-11-09 PROCEDURE — 36415 COLL VENOUS BLD VENIPUNCTURE: CPT

## 2022-11-09 PROCEDURE — G0009 ADMIN PNEUMOCOCCAL VACCINE: HCPCS | Performed by: FAMILY MEDICINE

## 2022-11-09 PROCEDURE — 96160 PT-FOCUSED HLTH RISK ASSMT: CPT | Performed by: FAMILY MEDICINE

## 2022-11-09 PROCEDURE — 80061 LIPID PANEL: CPT

## 2022-11-09 PROCEDURE — 69209 REMOVE IMPACTED EAR WAX UNI: CPT | Performed by: FAMILY MEDICINE

## 2022-11-09 PROCEDURE — 90677 PCV20 VACCINE IM: CPT | Performed by: FAMILY MEDICINE

## 2022-11-09 PROCEDURE — 80053 COMPREHEN METABOLIC PANEL: CPT

## 2022-11-09 PROCEDURE — 99214 OFFICE O/P EST MOD 30 MIN: CPT | Performed by: FAMILY MEDICINE

## 2022-11-09 PROCEDURE — 1170F FXNL STATUS ASSESSED: CPT | Performed by: FAMILY MEDICINE

## 2022-11-09 RX ORDER — TIZANIDINE 2 MG/1
2 TABLET ORAL 2 TIMES DAILY PRN
Qty: 20 TABLET | Refills: 0 | Status: SHIPPED | OUTPATIENT
Start: 2022-11-09

## 2022-11-09 RX ORDER — DEXTROAMPHETAMINE SACCHARATE, AMPHETAMINE ASPARTATE MONOHYDRATE, DEXTROAMPHETAMINE SULFATE AND AMPHETAMINE SULFATE 7.5; 7.5; 7.5; 7.5 MG/1; MG/1; MG/1; MG/1
30 CAPSULE, EXTENDED RELEASE ORAL EVERY MORNING
Qty: 30 CAPSULE | Refills: 0 | Status: SHIPPED | OUTPATIENT
Start: 2022-11-09 | End: 2022-12-08 | Stop reason: SDUPTHER

## 2022-11-09 NOTE — PROGRESS NOTES
The ABCs of the Annual Wellness Visit  Subsequent Medicare Wellness Visit    Chief Complaint   Patient presents with   • Medicare Wellness-subsequent      Subjective    History of Present Illness:  Corey Velasquez is a 67 y.o. male who presents for a Subsequent Medicare Wellness Visit.    He is UTD on colonoscopy, last done 9/2018.  This had poor prep but was found to be normal.  Five year repeat recommended.  He is UTD on prostate cancer screening.  He is due for AAA screen.  He is due for low-dose CT chest for lung cancer screening.  He is UTD on COVID (due for bivalent booster),  Shingrix (12/2018), Tdap (10/2019), flu (10/2022).  He is due for [id number], Shingrix #2, Td.  He is due for routine labs.    He is dealing with left-sided sciatic nerve pain.  He has had issues with this for years, but it has been worsened for the past 3 months.  He has been doing a lot of physical work lately, caregiving for Zenobia who recently was diagnosed with cervical myelopathy.  She had decompression of the spine and is slowly improving, but she still can't do much yet.  Herrera can't walk very well because of the pain.  He has been doing a home exercise program.      He hasn't been to hear out of the L ear for the past 2 months  R ear is fine.  He had stuck a Q-tip in that left ear around the time the trouble started and saw some blood.  +Pain persisting in the L ear.    He continues on Protonix twice daily with Zofran as needed for his gastritis and Clark's esophagus.  Recent EGD with Dr. Feldman showed strictures and severe gastritis as well as Clark's esophagus.  His stomach is doing much better.  He did go for the repeat scope in June and it looked like everything had cleared up.     He is on Adderall XR for ADD.  He has been stable for well over a decade now.  Symptoms of concentration and focus have been well controlled.  No adverse effects.  He was originally diagnosed by Dr. Zeng Psychiatry many years ago.  He does  "not take drug holidays.      He is on metoprolol succinate for hypertension.  His blood pressure has been well controlled at home.  No chest pain, palpitations or shortness of breath.  He does follow with Cardiology.      He is on fluoxetine for depression.  Mood has been \"good.\"  Symptoms are well controlled.  No depressed mood or anhedonia, anxiety or panic attacks.    The following portions of the patient's history were reviewed and   updated as appropriate: allergies, current medications, past family history, past medical history, past social history, past surgical history and problem list.    Compared to one year ago, the patient feels his physical   health is the same.    Compared to one year ago, the patient feels his mental   health is the same.    Recent Hospitalizations:  He was not admitted to the hospital during the last year.       Current Medical Providers:  Patient Care Team:  Cleopatra Higginbotham MD as PCP - General (Family Medicine)    Outpatient Medications Prior to Visit   Medication Sig Dispense Refill   • FLUoxetine (PROzac) 40 MG capsule Take 1 capsule by mouth Daily. 90 capsule 1   • metoprolol succinate XL (TOPROL-XL) 25 MG 24 hr tablet Take 1 tablet by mouth Daily. 90 tablet 1   • pantoprazole (PROTONIX) 40 MG EC tablet TAKE 1 TABLET BY MOUTH TWICE A DAY WITH BREAKFAST AND SUPPER 60 tablet 5   • amphetamine-dextroamphetamine XR (ADDERALL XR) 30 MG 24 hr capsule Take 1 capsule by mouth Every Morning 30 capsule 0   • ondansetron ODT (Zofran ODT) 4 MG disintegrating tablet Place 1 tablet on the tongue Every 8 (Eight) Hours As Needed for Nausea or Vomiting. 30 tablet 0     No facility-administered medications prior to visit.       No opioid medication identified on active medication list. I have reviewed chart for other potential  high risk medication/s and harmful drug interactions in the elderly.          Aspirin is not on active medication list.  Aspirin use is not indicated based on " review of current medical condition/s. Risk of harm outweighs potential benefits.  .    Patient Active Problem List   Diagnosis   • Hypertension, essential   • Attention deficit disorder (ADD) without hyperactivity   • High risk medication use   • Mild episode of recurrent major depressive disorder (HCC)   • Generalized osteoarthritis   • Chronic pain of left knee   • Annual physical exam   • Encounter for long-term (current) use of medications   • Failed total knee arthroplasty (HCC)   • Other fatigue   • Iron deficiency anemia   • Acute superficial gastritis without hemorrhage   • Clark's esophagus without dysplasia   • History of COVID-19   • Chronic left-sided low back pain with left-sided sciatica   • Left ear pain     Advance Care Planning  Advance Directive is not on file.  ACP discussion was held with the patient during this visit. Patient has an advance directive (not in EMR), copy requested.    Review of Systems   Constitutional: Positive for fatigue. Negative for chills and fever.   HENT: Positive for hearing loss (Left ear). Negative for congestion and rhinorrhea.    Eyes: Negative for pain and visual disturbance.   Respiratory: Negative for cough and shortness of breath.    Cardiovascular: Negative for chest pain and palpitations.   Gastrointestinal: Negative for abdominal pain, constipation, diarrhea, nausea and vomiting.   Genitourinary: Negative for difficulty urinating and dysuria.   Musculoskeletal: Positive for arthralgias and back pain. Negative for myalgias.   Neurological: Negative for weakness and numbness.   Psychiatric/Behavioral: Negative for dysphoric mood and sleep disturbance. The patient is not nervous/anxious.         Objective    Vitals:    11/09/22 0834 11/09/22 0836 11/09/22 0920   BP: (!) 164/105 (!) 165/103 149/88   BP Location: Left arm Left arm Left arm   Patient Position: Sitting Sitting Sitting   Pulse: 89 82 85   Temp: 98.3 °F (36.8 °C)     TempSrc: Oral     Weight: 96 kg  "(211 lb 9.6 oz)     Height: 170.2 cm (67.01\")       Estimated body mass index is 33.13 kg/m² as calculated from the following:    Height as of this encounter: 170.2 cm (67.01\").    Weight as of this encounter: 96 kg (211 lb 9.6 oz).    BMI is >= 30 and <35. (Class 1 Obesity). The following options were offered after discussion;: exercise counseling/recommendations and nutrition counseling/recommendations      Does the patient have evidence of cognitive impairment? No    Physical Exam  Vitals reviewed.   Constitutional:       General: He is not in acute distress.     Appearance: Normal appearance. He is well-developed.   HENT:      Head: Normocephalic and atraumatic.      Right Ear: External ear normal.      Left Ear: External ear normal. There is impacted cerumen.      Mouth/Throat:      Mouth: Mucous membranes are moist.   Eyes:      Extraocular Movements: Extraocular movements intact.      Conjunctiva/sclera: Conjunctivae normal.      Pupils: Pupils are equal, round, and reactive to light.   Cardiovascular:      Rate and Rhythm: Normal rate and regular rhythm.      Heart sounds: No murmur heard.  Pulmonary:      Effort: Pulmonary effort is normal.      Breath sounds: Normal breath sounds. No wheezing, rhonchi or rales.   Abdominal:      General: Bowel sounds are normal. There is no distension.      Palpations: Abdomen is soft.      Tenderness: There is no abdominal tenderness.   Musculoskeletal:         General: Normal range of motion.   Skin:     General: Skin is warm and dry.   Neurological:      Mental Status: He is alert and oriented to person, place, and time.      Deep Tendon Reflexes: Reflexes normal.   Psychiatric:         Mood and Affect: Mood and affect normal.         Behavior: Behavior normal.         Thought Content: Thought content normal.         Judgment: Judgment normal.                 HEALTH RISK ASSESSMENT    Smoking Status:  Social History     Tobacco Use   Smoking Status Former   • Packs/day: " 2.00   • Years: 30.00   • Pack years: 60.00   • Types: Cigarettes   • Quit date: 1/15/2012   • Years since quitting: 10.8   Smokeless Tobacco Never     Alcohol Consumption:  Social History     Substance and Sexual Activity   Alcohol Use Not Currently     Fall Risk Screen:    ARASELI Fall Risk Assessment was completed, and patient is at LOW risk for falls.Assessment completed on:11/9/2022    Depression Screening:  PHQ-2/PHQ-9 Depression Screening 11/9/2022   Retired PHQ-9 Total Score -   Retired Total Score -   Little Interest or Pleasure in Doing Things 0-->not at all   Feeling Down, Depressed or Hopeless 0-->not at all   PHQ-9: Brief Depression Severity Measure Score 0       Health Habits and Functional and Cognitive Screening:  Functional & Cognitive Status 11/9/2022   Do you have difficulty preparing food and eating? No   Do you have difficulty bathing yourself, getting dressed or grooming yourself? No   Do you have difficulty using the toilet? No   Do you have difficulty moving around from place to place? No   Do you have trouble with steps or getting out of a bed or a chair? No   Current Diet Well Balanced Diet   Dental Exam Up to date   Eye Exam Not up to date   Exercise (times per week) 1 times per week   Current Exercises Include Walking   Do you need help using the phone?  No   Are you deaf or do you have serious difficulty hearing?  No   Do you need help with transportation? No   Do you need help shopping? No   Do you need help preparing meals?  No   Do you need help with housework?  No   Do you need help with laundry? No   Do you need help taking your medications? No   Do you need help managing money? No   Do you ever drive or ride in a car without wearing a seat belt? No   Have you felt unusual stress, anger or loneliness in the last month? No   Who do you live with? Spouse   If you need help, do you have trouble finding someone available to you? No   Have you been bothered in the last four weeks by  sexual problems? No   Do you have difficulty concentrating, remembering or making decisions? No       Age-appropriate Screening Schedule:  Refer to the list below for future screening recommendations based on patient's age, sex and/or medical conditions. Orders for these recommended tests are listed in the plan section. The patient has been provided with a written plan.    Health Maintenance   Topic Date Due   • ZOSTER VACCINE (2 of 2) 11/29/2018   • TDAP/TD VACCINES (2 - Td or Tdap) 10/01/2029   • INFLUENZA VACCINE  Completed              Assessment & Plan   CMS Preventative Services Quick Reference  Risk Factors Identified During Encounter  Immunizations Discussed/Encouraged (specific Immunizations; Influenza, Prevnar 20 (Pneumococcal 20-valent conjugate), Shingrix and COVID19  The above risks/problems have been discussed with the patient.  Follow up actions/plans if indicated are seen below in the Assessment/Plan Section.  Pertinent information has been shared with the patient in the After Visit Summary.    Diagnoses and all orders for this visit:    1. Annual physical exam (Primary)  Assessment & Plan:  He is UTD on colonoscopy, last done 9/2018.  This had poor prep but was found to be normal.  Five year repeat recommended.  He is UTD on prostate cancer screening.  He is due for AAA screen; declines today but will be willing to do this in the future once Zenobia is doing better.  He is due for low-dose CT chest for lung cancer screening; declines today but is willing to do this in the future-given today.  He is UTD on COVID (due for bivalent booster),  Shingrix (12/2018), Tdap (10/2019), flu (10/2022).  He is due for [id number], Shingrix #2, Td.  Prevnar 20 given today.  Shingrix can be done at the pharmacy.  He is due for routine labs; ordered.      2. Left ear pain  Assessment & Plan:  Partial cerumen impaction on the left ear.  We can get that cleaned out from today and see if that helps with his pain and  hearing loss on that side.  No problems on the right.    Orders:  -     Ear Cerumen Removal    3. Chronic left-sided low back pain with left-sided sciatica  Assessment & Plan:  Acute on chronic low back pain with L-sided sciatica for the past 3 months.  He has had some physical caregiving responsibilities taking care of his wife.  Discussed home exercises but he does not feel he has time to go back to Physical Therapy at present.  I am going to send in a muscle relaxer for him to try to see if this might help.  Discussed that we need to make sure we are taking care of him to where he will not be able to take care of Zenobia.    Orders:  -     tiZANidine (ZANAFLEX) 2 MG tablet; Take 1 tablet by mouth 2 (Two) Times a Day As Needed for Muscle Spasms.  Dispense: 20 tablet; Refill: 0    4. Attention deficit disorder (ADD) without hyperactivity  Assessment & Plan:  Stable on current regimen.  Symptoms are well controlled.  No adverse effects. He does require ongoing use of this controlled substance to function.  Tox screen was due today.  Prior tox screen appropriate.  ANGEL was run today.  Refills were needed today.  RTC 3 months.      Orders:  -     amphetamine-dextroamphetamine XR (ADDERALL XR) 30 MG 24 hr capsule; Take 1 capsule by mouth Every Morning  Dispense: 30 capsule; Refill: 0    5. Long-term use of high-risk medication  -     POC Urine Drug Screen [facility]Cup    6. Hypertension, essential  Assessment & Plan:  Stable on metoprolol succinate 25 mg daily.  No refills needed.  Checking labs.    Orders:  -     Comprehensive Metabolic Panel; Future  -     Lipid Panel; Future    7. Encounter for immunization  -     Pneumococcal Conjugate Vaccine 20-Valent All  -     COVID-19 Bivalent Booster (Pfizer) 12+yrs      Follow Up:   Return in about 3 months (around 2/9/2023) for Recheck.     An After Visit Summary and PPPS were made available to the patient.        Ear Cerumen Removal    Date/Time: 11/9/2022 9:26  AM  Performed by: Nuria Craft RN  Authorized by: Cleopatra Higginbotham MD     Anesthesia:  Local Anesthetic: none  Location details: left ear  Patient tolerance: patient tolerated the procedure well with no immediate complications  Procedure type: irrigation   Sedation:  Patient sedated: no

## 2022-11-09 NOTE — ASSESSMENT & PLAN NOTE
Acute on chronic low back pain with L-sided sciatica for the past 3 months.  He has had some physical caregiving responsibilities taking care of his wife.  Discussed home exercises but he does not feel he has time to go back to Physical Therapy at present.  I am going to send in a muscle relaxer for him to try to see if this might help.  Discussed that we need to make sure we are taking care of him to where he will not be able to take care of Zenobia.

## 2022-11-09 NOTE — ASSESSMENT & PLAN NOTE
Stable on current regimen.  Symptoms are well controlled.  No adverse effects. He does require ongoing use of this controlled substance to function.  Tox screen was due today.  Prior tox screen appropriate.  ANGEL was run today.  Refills were needed today.  RTC 3 months.

## 2022-11-09 NOTE — ASSESSMENT & PLAN NOTE
He is UTD on colonoscopy, last done 9/2018.  This had poor prep but was found to be normal.  Five year repeat recommended.  He is UTD on prostate cancer screening.  He is due for AAA screen; declines today but will be willing to do this in the future once Zenobia is doing better.  He is due for low-dose CT chest for lung cancer screening; declines today but is willing to do this in the future-given today.  He is UTD on COVID (due for bivalent booster),  Shingrix (12/2018), Tdap (10/2019), flu (10/2022).  He is due for [id number], Shingrix #2, Td.  Prevnar 20 given today.  Shingrix can be done at the pharmacy.  He is due for routine labs; ordered.

## 2022-11-09 NOTE — ASSESSMENT & PLAN NOTE
Partial cerumen impaction on the left ear.  We can get that cleaned out from today and see if that helps with his pain and hearing loss on that side.  No problems on the right.   3-5x/week

## 2022-11-09 NOTE — PATIENT INSTRUCTIONS
Low Back Sprain or Strain Rehab  Ask your health care provider which exercises are safe for you. Do exercises exactly as told by your health care provider and adjust them as directed. It is normal to feel mild stretching, pulling, tightness, or discomfort as you do these exercises. Stop right away if you feel sudden pain or your pain gets worse. Do not begin these exercises until told by your health care provider.  Stretching and range-of-motion exercises  These exercises warm up your muscles and joints and improve the movement and flexibility of your back. These exercises also help to relieve pain, numbness, and tingling.  Lumbar rotation    Lie on your back on a firm bed or the floor with your knees bent.  Straighten your arms out to your sides so each arm forms a 90-degree angle (right angle) with a side of your body.  Slowly move (rotate) both of your knees to one side of your body until you feel a stretch in your lower back (lumbar). Try not to let your shoulders lift off the floor.  Hold this position for __________ seconds.  Tense your abdominal muscles and slowly move your knees back to the starting position.  Repeat this exercise on the other side of your body.  Repeat __________ times. Complete this exercise __________ times a day.  Single knee to chest    Lie on your back on a firm bed or the floor with both legs straight.  Bend one of your knees. Use your hands to move your knee up toward your chest until you feel a gentle stretch in your lower back and buttock.  Hold your leg in this position by holding on to the front of your knee.  Keep your other leg as straight as possible.  Hold this position for __________ seconds.  Slowly return to the starting position.  Repeat with your other leg.  Repeat __________ times. Complete this exercise __________ times a day.  Prone extension on elbows    Lie on your abdomen on a firm bed or the floor (prone position).  Prop yourself up on your elbows.  Use your arms  to help lift your chest up until you feel a gentle stretch in your abdomen and your lower back.  This will place some of your body weight on your elbows. If this is uncomfortable, try stacking pillows under your chest.  Your hips should stay down, against the surface that you are lying on. Keep your hip and back muscles relaxed.  Hold this position for __________ seconds.  Slowly relax your upper body and return to the starting position.  Repeat __________ times. Complete this exercise __________ times a day.  Strengthening exercises  These exercises build strength and endurance in your back. Endurance is the ability to use your muscles for a long time, even after they get tired.  Pelvic tilt  This exercise strengthens the muscles that lie deep in the abdomen.  Lie on your back on a firm bed or the floor with your legs extended.  Bend your knees so they are pointing toward the ceiling and your feet are flat on the floor.  Tighten your lower abdominal muscles to press your lower back against the floor. This motion will tilt your pelvis so your tailbone points up toward the ceiling instead of pointing to your feet or the floor.  To help with this exercise, you may place a small towel under your lower back and try to push your back into the towel.  Hold this position for __________ seconds.  Let your muscles relax completely before you repeat this exercise.  Repeat __________ times. Complete this exercise __________ times a day.  Alternating arm and leg raises    Get on your hands and knees on a firm surface. If you are on a hard floor, you may want to use padding, such as an exercise mat, to cushion your knees.  Line up your arms and legs. Your hands should be directly below your shoulders, and your knees should be directly below your hips.  Lift your left leg behind you. At the same time, raise your right arm and straighten it in front of you.  Do not lift your leg higher than your hip.  Do not lift your arm higher  than your shoulder.  Keep your abdominal and back muscles tight.  Keep your hips facing the ground.  Do not arch your back.  Keep your balance carefully, and do not hold your breath.  Hold this position for __________ seconds.  Slowly return to the starting position.  Repeat with your right leg and your left arm.  Repeat __________ times. Complete this exercise __________ times a day.  Abdominal set with straight leg raise    Lie on your back on a firm bed or the floor.  Bend one of your knees and keep your other leg straight.  Tense your abdominal muscles and lift your straight leg up, 4-6 inches (10-15 cm) off the ground.  Keep your abdominal muscles tight and hold this position for __________ seconds.  Do not hold your breath.  Do not arch your back. Keep it flat against the ground.  Keep your abdominal muscles tense as you slowly lower your leg back to the starting position.  Repeat with your other leg.  Repeat __________ times. Complete this exercise __________ times a day.  Single leg lower with bent knees  Lie on your back on a firm bed or the floor.  Tense your abdominal muscles and lift your feet off the floor, one foot at a time, so your knees and hips are bent in 90-degree angles (right angles).  Your knees should be over your hips and your lower legs should be parallel to the floor.  Keeping your abdominal muscles tense and your knee bent, slowly lower one of your legs so your toe touches the ground.  Lift your leg back up to return to the starting position.  Do not hold your breath.  Do not let your back arch. Keep your back flat against the ground.  Repeat with your other leg.  Repeat __________ times. Complete this exercise __________ times a day.  Posture and body mechanics  Good posture and healthy body mechanics can help to relieve stress in your body's tissues and joints. Body mechanics refers to the movements and positions of your body while you do your daily activities. Posture is part of body  mechanics. Good posture means:  Your spine is in its natural S-curve position (neutral).  Your shoulders are pulled back slightly.  Your head is not tipped forward (neutral).  Follow these guidelines to improve your posture and body mechanics in your everyday activities.  Standing    When standing, keep your spine neutral and your feet about hip-width apart. Keep a slight bend in your knees. Your ears, shoulders, and hips should line up.  When you do a task in which you  one place for a long time, place one foot up on a stable object that is 2-4 inches (5-10 cm) high, such as a footstool. This helps keep your spine neutral.  Sitting    When sitting, keep your spine neutral and keep your feet flat on the floor. Use a footrest, if necessary, and keep your thighs parallel to the floor. Avoid rounding your shoulders, and avoid tilting your head forward.  When working at a desk or a computer, keep your desk at a height where your hands are slightly lower than your elbows. Slide your chair under your desk so you are close enough to maintain good posture.  When working at a computer, place your monitor at a height where you are looking straight ahead and you do not have to tilt your head forward or downward to look at the screen.  Resting  When lying down and resting, avoid positions that are most painful for you.  If you have pain with activities such as sitting, bending, stooping, or squatting, lie in a position in which your body does not bend very much. For example, avoid curling up on your side with your arms and knees near your chest (fetal position).  If you have pain with activities such as standing for a long time or reaching with your arms, lie with your spine in a neutral position and bend your knees slightly. Try the following positions:  Lying on your side with a pillow between your knees.  Lying on your back with a pillow under your knees.  Lifting    When lifting objects, keep your feet at least  shoulder-width apart and tighten your abdominal muscles.  Bend your knees and hips and keep your spine neutral. It is important to lift using the strength of your legs, not your back. Do not lock your knees straight out.  Always ask for help to lift heavy or awkward objects.  This information is not intended to replace advice given to you by your health care provider. Make sure you discuss any questions you have with your health care provider.  Document Revised: 03/07/2022 Document Reviewed: 03/07/2022  Elsevier Patient Education © 2022 [employer]

## 2022-12-08 DIAGNOSIS — F98.8 ATTENTION DEFICIT DISORDER (ADD) WITHOUT HYPERACTIVITY: Chronic | ICD-10-CM

## 2022-12-08 RX ORDER — DEXTROAMPHETAMINE SACCHARATE, AMPHETAMINE ASPARTATE MONOHYDRATE, DEXTROAMPHETAMINE SULFATE AND AMPHETAMINE SULFATE 7.5; 7.5; 7.5; 7.5 MG/1; MG/1; MG/1; MG/1
30 CAPSULE, EXTENDED RELEASE ORAL EVERY MORNING
Qty: 30 CAPSULE | Refills: 0 | Status: SHIPPED | OUTPATIENT
Start: 2022-12-08 | End: 2023-01-06 | Stop reason: SDUPTHER

## 2022-12-08 NOTE — TELEPHONE ENCOUNTER
Caller: Corey Velasquez    Relationship: Self    Best call back number: [phone]    Requested Prescriptions:   Requested Prescriptions     Pending Prescriptions Disp Refills   • amphetamine-dextroamphetamine XR (ADDERALL XR) 30 MG 24 hr capsule 30 capsule 0     Sig: Take 1 capsule by mouth Every Morning        Pharmacy where request should be sent: [facility], [address] [phone]  - [phone] FX     Does the patient have less than a 3 day supply:  [x] Yes  [] No    Would you like a call back once the refill request has been completed: [] Yes [x] No    If the office needs to give you a call back, can they leave a voicemail: [] Yes [x] No    Jeanmarie Jeffers Rep   12/08/22 12:51 EST

## 2023-01-06 DIAGNOSIS — F98.8 ATTENTION DEFICIT DISORDER (ADD) WITHOUT HYPERACTIVITY: Chronic | ICD-10-CM

## 2023-01-06 DIAGNOSIS — I10 HYPERTENSION, ESSENTIAL: ICD-10-CM

## 2023-01-06 RX ORDER — METOPROLOL SUCCINATE 25 MG/1
25 TABLET, EXTENDED RELEASE ORAL DAILY
Qty: 90 TABLET | Refills: 1 | Status: SHIPPED | OUTPATIENT
Start: 2023-01-06

## 2023-01-06 RX ORDER — DEXTROAMPHETAMINE SACCHARATE, AMPHETAMINE ASPARTATE MONOHYDRATE, DEXTROAMPHETAMINE SULFATE AND AMPHETAMINE SULFATE 7.5; 7.5; 7.5; 7.5 MG/1; MG/1; MG/1; MG/1
30 CAPSULE, EXTENDED RELEASE ORAL EVERY MORNING
Qty: 30 CAPSULE | Refills: 0 | Status: SHIPPED | OUTPATIENT
Start: 2023-01-06 | End: 2023-02-02 | Stop reason: SDUPTHER

## 2023-01-26 DIAGNOSIS — F33.0 MILD EPISODE OF RECURRENT MAJOR DEPRESSIVE DISORDER: ICD-10-CM

## 2023-01-27 RX ORDER — FLUOXETINE HYDROCHLORIDE 40 MG/1
40 CAPSULE ORAL DAILY
Qty: 90 CAPSULE | Refills: 1 | Status: SHIPPED | OUTPATIENT
Start: 2023-01-27

## 2023-02-02 DIAGNOSIS — F98.8 ATTENTION DEFICIT DISORDER (ADD) WITHOUT HYPERACTIVITY: Chronic | ICD-10-CM

## 2023-02-02 NOTE — TELEPHONE ENCOUNTER
Caller: Corey Velasquez    Relationship: Self    Best call back number: [phone]    Requested Prescriptions:   Requested Prescriptions     Pending Prescriptions Disp Refills   • amphetamine-dextroamphetamine XR (ADDERALL XR) 30 MG 24 hr capsule 30 capsule 0     Sig: Take 1 capsule by mouth Every Morning        Pharmacy where request should be sent: [facility], [address] [phone]  - [phone] FX     :     Does the patient have less than a 3 day supply:  [x] Yes  [] No      Jeanmarie Gonzalez Rep   02/02/23 12:59 EST

## 2023-02-03 RX ORDER — DEXTROAMPHETAMINE SACCHARATE, AMPHETAMINE ASPARTATE MONOHYDRATE, DEXTROAMPHETAMINE SULFATE AND AMPHETAMINE SULFATE 7.5; 7.5; 7.5; 7.5 MG/1; MG/1; MG/1; MG/1
30 CAPSULE, EXTENDED RELEASE ORAL EVERY MORNING
Qty: 30 CAPSULE | Refills: 0 | Status: SHIPPED | OUTPATIENT
Start: 2023-02-03 | End: 2023-03-08 | Stop reason: SDUPTHER

## 2023-02-10 ENCOUNTER — OFFICE VISIT (OUTPATIENT)
Dept: FAMILY MEDICINE CLINIC | Age: 68
End: 2023-02-10
Payer: MEDICARE

## 2023-02-10 VITALS
WEIGHT: 209.2 LBS | HEART RATE: 89 BPM | HEIGHT: 67 IN | OXYGEN SATURATION: 96 % | DIASTOLIC BLOOD PRESSURE: 99 MMHG | SYSTOLIC BLOOD PRESSURE: 167 MMHG | TEMPERATURE: 98.5 F | BODY MASS INDEX: 32.83 KG/M2

## 2023-02-10 DIAGNOSIS — F98.8 ATTENTION DEFICIT DISORDER (ADD) WITHOUT HYPERACTIVITY: Primary | Chronic | ICD-10-CM

## 2023-02-10 DIAGNOSIS — G89.29 CHRONIC LEFT-SIDED LOW BACK PAIN WITH LEFT-SIDED SCIATICA: ICD-10-CM

## 2023-02-10 DIAGNOSIS — M54.42 CHRONIC LEFT-SIDED LOW BACK PAIN WITH LEFT-SIDED SCIATICA: ICD-10-CM

## 2023-02-10 DIAGNOSIS — F33.0 MILD EPISODE OF RECURRENT MAJOR DEPRESSIVE DISORDER: ICD-10-CM

## 2023-02-10 DIAGNOSIS — Z79.899 HIGH RISK MEDICATION USE: Chronic | ICD-10-CM

## 2023-02-10 DIAGNOSIS — K22.70 BARRETT'S ESOPHAGUS WITHOUT DYSPLASIA: ICD-10-CM

## 2023-02-10 PROBLEM — Z00.00 ANNUAL PHYSICAL EXAM: Status: RESOLVED | Noted: 2021-09-22 | Resolved: 2023-02-10

## 2023-02-10 PROBLEM — H92.02 LEFT EAR PAIN: Status: RESOLVED | Noted: 2022-11-09 | Resolved: 2023-02-10

## 2023-02-10 PROCEDURE — 99214 OFFICE O/P EST MOD 30 MIN: CPT | Performed by: FAMILY MEDICINE

## 2023-02-10 PROCEDURE — 80305 DRUG TEST PRSMV DIR OPT OBS: CPT | Performed by: FAMILY MEDICINE

## 2023-02-10 NOTE — ASSESSMENT & PLAN NOTE
Ongoing issues with chronic low back pain.  He is going to see his orthopedist next week and is hoping to get an injection that may help his lower extremity symptoms.  We did discuss that relief may be incomplete if the symptoms are in fact related to his sciatic nerve problems.  Consider referral to [facility] but we would need to obtain a lumbar spine MRI without contrast prior to that.  He has already tried and failed Physical Therapy as well as conservative therapy.  Currently on tizanidine as needed.

## 2023-02-10 NOTE — ASSESSMENT & PLAN NOTE
Doing well on pantoprazole.  He has weaned down from twice daily to once daily.  Following with Dr. Feldman for EGD.  Symptoms well controlled at present.

## 2023-02-10 NOTE — PROGRESS NOTES
"Chief Complaint  ADD (3 month f/u)    Subjective          Corey Velasuqez presents to [facility] today for routine follow-up on chronic issues.    He is on Adderall XR for ADD.  He has been stable on this regimen for well over a decade now.  Focus and concentration have been well controlled.  Denies adverse effects.  He was originally diagnosed by Dr. Zeng Psychiatry many years ago.  He does not take drug holidays.  He has been having trouble getting his Adderall due to the back order but finally got that filled yesterday.      He is dealing with left-sided sciatic nerve pain.  He has had issues with this for years, but it has been worsened for the past 3 months.  He has been doing a lot of physical work lately, caregiving for Zenobia who recently was diagnosed with cervical myelopathy.  She had decompression of the spine and is slowly improving, but she still can't do much yet.  Herrera can't walk very well because of the pain.  He has been doing a home exercise program.  He is following with Dr. Ceballos (saw Dr. Banks back in July).  Has chronic low back pain with sciatica.  S/p PT without relief.  He is hopeful that Dr. Ceballos will give him an injection in the leg as this has helped in the past.    He is on metoprolol succinate for HTN.  BP has been well controlled at home.  Denies chest pain, palpitations or shortness of breath.  Following with Cardiology.      He is on Protonix now once daily with Zofran as needed for his gastritis and Clark's esophagus.  EGD done within the last year by Dr. Feldman showed strictures and severe gastritis as well as Clark's esophagus.  Repeat scope has shown some improvement.      He is on fluoxetine for depression.  Mood has been \"okay.\"  His  sx are well controlled.  Denies depressed mood or anhedonia, anxiety or panic attacks.      Current Outpatient Medications:   •  amphetamine-dextroamphetamine XR (ADDERALL XR) 30 MG 24 hr capsule, " "Take 1 capsule by mouth Every Morning, Disp: 30 capsule, Rfl: 0  •  FLUoxetine (PROzac) 40 MG capsule, Take 1 capsule by mouth Daily., Disp: 90 capsule, Rfl: 1  •  metoprolol succinate XL (TOPROL-XL) 25 MG 24 hr tablet, Take 1 tablet by mouth Daily., Disp: 90 tablet, Rfl: 1  •  pantoprazole (PROTONIX) 40 MG EC tablet, TAKE 1 TABLET BY MOUTH TWICE A DAY WITH BREAKFAST AND SUPPER, Disp: 60 tablet, Rfl: 5  •  tiZANidine (ZANAFLEX) 2 MG tablet, Take 1 tablet by mouth 2 (Two) Times a Day As Needed for Muscle Spasms. (Patient taking differently: Take 2 mg by mouth As Needed for Muscle Spasms.), Disp: 20 tablet, Rfl: 0    Allergies:  Patient has no known allergies.      Objective   Vital Signs:   Vitals:    02/10/23 0816 02/10/23 0847   BP: (!) 148/101 167/99   BP Location: Right arm Right arm   Patient Position: Sitting Sitting   Cuff Size: Adult    Pulse: 88 89   Temp: 98.5 °F (36.9 °C)    TempSrc: Oral    SpO2: 96%    Weight: 94.9 kg (209 lb 3.2 oz)    Height: 170.2 cm (67.01\")        Physical Exam  Vitals reviewed.   Constitutional:       General: He is not in acute distress.     Appearance: Normal appearance. He is well-developed.   HENT:      Head: Normocephalic and atraumatic.      Right Ear: External ear normal.      Left Ear: External ear normal.   Eyes:      Extraocular Movements: Extraocular movements intact.      Conjunctiva/sclera: Conjunctivae normal.      Pupils: Pupils are equal, round, and reactive to light.   Cardiovascular:      Rate and Rhythm: Normal rate and regular rhythm.      Heart sounds: No murmur heard.  Pulmonary:      Effort: Pulmonary effort is normal.      Breath sounds: Normal breath sounds. No wheezing, rhonchi or rales.   Abdominal:      General: Bowel sounds are normal. There is no distension.      Palpations: Abdomen is soft.      Tenderness: There is no abdominal tenderness.   Musculoskeletal:         General: Normal range of motion.   Neurological:      Mental Status: He is alert. "   Psychiatric:         Mood and Affect: Affect normal.          Lab Results   Component Value Date    GLUCOSE 92 11/09/2022    BUN 9 11/09/2022    CREATININE 0.81 11/09/2022    EGFRIFNONA 99 11/23/2021    BCR 11.1 11/09/2022    K 4.3 11/09/2022    CO2 28.0 11/09/2022    CALCIUM 8.9 11/09/2022    ALBUMIN 4.00 11/09/2022    LABIL2 1.2 (L) 08/10/2020    AST 17 11/09/2022    ALT 11 11/09/2022       Lab Results   Component Value Date    CHOL 173 11/09/2022    CHLPL 173 08/10/2020    TRIG 76 11/09/2022    HDL 43 11/09/2022     (H) 11/09/2022            Assessment and Plan    Diagnoses and all orders for this visit:    1. Attention deficit disorder (ADD) without hyperactivity (Primary)  Assessment & Plan:  Stable on current regimen.  Symptoms are well controlled.  No adverse effects. He does require ongoing use of this controlled substance to function.  Tox screen was due today.  Prior tox screen appropriate.  ANGEL was run today.  Refills were not needed today.  RTC 3 months.        2. High risk medication use  -     POC Urine Drug Screen [facility]    3. Mild episode of recurrent major depressive disorder (HCC)  Assessment & Plan:  Stable on fluoxetine 40 mg daily.  No refills needed.  Continue to monitor.      4. Clark's esophagus without dysplasia  Assessment & Plan:  Doing well on pantoprazole.  He has weaned down from twice daily to once daily.  Following with Dr. Feldman for EGD.  Symptoms well controlled at present.      5. Chronic left-sided low back pain with left-sided sciatica  Assessment & Plan:  Ongoing issues with chronic low back pain.  He is going to see his orthopedist next week and is hoping to get an injection that may help his lower extremity symptoms.  We did discuss that relief may be incomplete if the symptoms are in fact related to his sciatic nerve problems.  Consider referral to [facility] but we would need to obtain a lumbar spine MRI without contrast prior to that.  He  has already tried and failed Physical Therapy as well as conservative therapy.  Currently on tizanidine as needed.        Follow Up   Return in about 3 months (around 5/10/2023) for Recheck.  Patient was given instructions and counseling regarding his condition or for health maintenance advice. Please see specific information pulled into the AVS if appropriate.           02/10/2023

## 2023-03-08 ENCOUNTER — TELEPHONE (OUTPATIENT)
Dept: FAMILY MEDICINE CLINIC | Age: 68
End: 2023-03-08

## 2023-03-08 DIAGNOSIS — F98.8 ATTENTION DEFICIT DISORDER (ADD) WITHOUT HYPERACTIVITY: Chronic | ICD-10-CM

## 2023-03-08 RX ORDER — DEXTROAMPHETAMINE SACCHARATE, AMPHETAMINE ASPARTATE MONOHYDRATE, DEXTROAMPHETAMINE SULFATE AND AMPHETAMINE SULFATE 7.5; 7.5; 7.5; 7.5 MG/1; MG/1; MG/1; MG/1
30 CAPSULE, EXTENDED RELEASE ORAL EVERY MORNING
Qty: 30 CAPSULE | Refills: 0 | Status: SHIPPED | OUTPATIENT
Start: 2023-03-08 | End: 2023-04-06 | Stop reason: SDUPTHER

## 2023-03-08 NOTE — TELEPHONE ENCOUNTER
"  Caller: Corey Velasquez \"Herrera\"    Relationship: Self    Best call back number: [phone]    Requested Prescriptions:   Requested Prescriptions     Pending Prescriptions Disp Refills   • amphetamine-dextroamphetamine XR (ADDERALL XR) 30 MG 24 hr capsule 30 capsule 0     Sig: Take 1 capsule by mouth Every Morning        Pharmacy where request should be sent: [facility] - [address] [phone] [facility] [phone] FX     Does the patient have less than a 3 day supply:  [x] Yes  [] No    Would you like a call back once the refill request has been completed: [] Yes [x] No    If the office needs to give you a call back, can they leave a voicemail: [] Yes [x] No    Jeanmarie Pierce Rep   03/08/23 14:09 EST         "

## 2023-04-06 ENCOUNTER — TELEPHONE (OUTPATIENT)
Dept: FAMILY MEDICINE CLINIC | Age: 68
End: 2023-04-06
Payer: MEDICARE

## 2023-04-06 DIAGNOSIS — F98.8 ATTENTION DEFICIT DISORDER (ADD) WITHOUT HYPERACTIVITY: Chronic | ICD-10-CM

## 2023-04-06 NOTE — TELEPHONE ENCOUNTER
"    Caller: Corey Velasquez \"Herrera\"    Relationship: Self    Best call back number:[phone]    Requested Prescriptions:   Requested Prescriptions     Pending Prescriptions Disp Refills   • amphetamine-dextroamphetamine XR (ADDERALL XR) 30 MG 24 hr capsule 30 capsule 0     Sig: Take 1 capsule by mouth Every Morning        Pharmacy where request should be sent: [facility] [address] [phone]  - [phone]      Last office visit with prescribing clinician: 2/10/2023   Last telemedicine visit with prescribing clinician: 5/17/2023   Next office visit with prescribing clinician: 5/17/2023         Does the patient have less than a 3 day supply:  [x] Yes  [] No    Would you like a call back once the refill request has been completed: [] Yes [x] No    If the office needs to give you a call back, can they leave a voicemail: [] Yes [x] No    Jeanmarie Pierce   04/06/23 16:08 EDT         "

## 2023-04-07 RX ORDER — DEXTROAMPHETAMINE SACCHARATE, AMPHETAMINE ASPARTATE MONOHYDRATE, DEXTROAMPHETAMINE SULFATE AND AMPHETAMINE SULFATE 7.5; 7.5; 7.5; 7.5 MG/1; MG/1; MG/1; MG/1
30 CAPSULE, EXTENDED RELEASE ORAL EVERY MORNING
Qty: 30 CAPSULE | Refills: 0 | Status: SHIPPED | OUTPATIENT
Start: 2023-04-07

## 2023-05-09 DIAGNOSIS — F98.8 ATTENTION DEFICIT DISORDER (ADD) WITHOUT HYPERACTIVITY: Chronic | ICD-10-CM

## 2023-05-09 RX ORDER — DEXTROAMPHETAMINE SACCHARATE, AMPHETAMINE ASPARTATE MONOHYDRATE, DEXTROAMPHETAMINE SULFATE AND AMPHETAMINE SULFATE 7.5; 7.5; 7.5; 7.5 MG/1; MG/1; MG/1; MG/1
30 CAPSULE, EXTENDED RELEASE ORAL EVERY MORNING
Qty: 30 CAPSULE | Refills: 0 | Status: SHIPPED | OUTPATIENT
Start: 2023-05-09

## 2023-05-09 NOTE — TELEPHONE ENCOUNTER
"     Caller: Corey Velasquez \"Herrera\"    Relationship: Self    Best call back number: [phone]    Requested Prescriptions:   Requested Prescriptions     Pending Prescriptions Disp Refills   • amphetamine-dextroamphetamine XR (ADDERALL XR) 30 MG 24 hr capsule 30 capsule 0     Sig: Take 1 capsule by mouth Every Morning        Pharmacy where request should be sent: [facility] [address] [phone]  - [phone]      Last office visit with prescribing clinician: 2/10/2023   Last telemedicine visit with prescribing clinician: 4/6/2023   Next office visit with prescribing clinician: 5/17/2023     Additional details provided by patient:          Does the patient have less than a 3 day supply:  [x] Yes  [] No    Would you like a call back once the refill request has been completed: [] Yes [x] No    If the office needs to give you a call back, can they leave a voicemail: [] Yes [x] No    Jeanmarie Cosme Rep   05/09/23 15:40 EDT        "

## 2023-05-17 ENCOUNTER — OFFICE VISIT (OUTPATIENT)
Dept: FAMILY MEDICINE CLINIC | Age: 68
End: 2023-05-17
Payer: MEDICARE

## 2023-05-17 ENCOUNTER — LAB (OUTPATIENT)
Dept: LAB | Facility: HOSPITAL | Age: 68
End: 2023-05-17
Payer: MEDICARE

## 2023-05-17 VITALS
WEIGHT: 208 LBS | SYSTOLIC BLOOD PRESSURE: 144 MMHG | TEMPERATURE: 98.9 F | OXYGEN SATURATION: 97 % | DIASTOLIC BLOOD PRESSURE: 98 MMHG | HEIGHT: 67 IN | HEART RATE: 60 BPM | BODY MASS INDEX: 32.65 KG/M2

## 2023-05-17 DIAGNOSIS — K29.00 ACUTE SUPERFICIAL GASTRITIS WITHOUT HEMORRHAGE: ICD-10-CM

## 2023-05-17 DIAGNOSIS — I10 HYPERTENSION, ESSENTIAL: ICD-10-CM

## 2023-05-17 DIAGNOSIS — F33.0 MILD EPISODE OF RECURRENT MAJOR DEPRESSIVE DISORDER: ICD-10-CM

## 2023-05-17 DIAGNOSIS — Z79.899 HIGH RISK MEDICATION USE: Chronic | ICD-10-CM

## 2023-05-17 DIAGNOSIS — Z12.5 PROSTATE CANCER SCREENING: ICD-10-CM

## 2023-05-17 DIAGNOSIS — F98.8 ATTENTION DEFICIT DISORDER (ADD) WITHOUT HYPERACTIVITY: Primary | Chronic | ICD-10-CM

## 2023-05-17 DIAGNOSIS — K22.70 BARRETT'S ESOPHAGUS WITHOUT DYSPLASIA: ICD-10-CM

## 2023-05-17 PROBLEM — Z86.16 HISTORY OF COVID-19: Status: RESOLVED | Noted: 2022-07-05 | Resolved: 2023-05-17

## 2023-05-17 LAB
ALBUMIN SERPL-MCNC: 4 G/DL (ref 3.5–5.2)
ALBUMIN/GLOB SERPL: 1.3 G/DL
ALP SERPL-CCNC: 252 U/L (ref 39–117)
ALT SERPL W P-5'-P-CCNC: 14 U/L (ref 1–41)
AMPHET+METHAMPHET UR QL: POSITIVE
AMPHETAMINES UR QL: NEGATIVE
ANION GAP SERPL CALCULATED.3IONS-SCNC: 7 MMOL/L (ref 5–15)
AST SERPL-CCNC: 24 U/L (ref 1–40)
BARBITURATES UR QL SCN: NEGATIVE
BASOPHILS # BLD AUTO: 0.05 10*3/MM3 (ref 0–0.2)
BASOPHILS NFR BLD AUTO: 0.7 % (ref 0–1.5)
BENZODIAZ UR QL SCN: NEGATIVE
BILIRUB SERPL-MCNC: 0.2 MG/DL (ref 0–1.2)
BUN SERPL-MCNC: 8 MG/DL (ref 8–23)
BUN/CREAT SERPL: 10.4 (ref 7–25)
BUPRENORPHINE SERPL-MCNC: NEGATIVE NG/ML
CALCIUM SPEC-SCNC: 8.9 MG/DL (ref 8.6–10.5)
CANNABINOIDS SERPL QL: NEGATIVE
CHLORIDE SERPL-SCNC: 102 MMOL/L (ref 98–107)
CHOLEST SERPL-MCNC: 146 MG/DL (ref 0–200)
CO2 SERPL-SCNC: 29 MMOL/L (ref 22–29)
COCAINE UR QL: NEGATIVE
CREAT SERPL-MCNC: 0.77 MG/DL (ref 0.76–1.27)
DEPRECATED RDW RBC AUTO: 42 FL (ref 37–54)
EGFRCR SERPLBLD CKD-EPI 2021: 98.7 ML/MIN/1.73
EOSINOPHIL # BLD AUTO: 0.18 10*3/MM3 (ref 0–0.4)
EOSINOPHIL NFR BLD AUTO: 2.5 % (ref 0.3–6.2)
ERYTHROCYTE [DISTWIDTH] IN BLOOD BY AUTOMATED COUNT: 12 % (ref 12.3–15.4)
EXPIRATION DATE: ABNORMAL
GLOBULIN UR ELPH-MCNC: 3 GM/DL
GLUCOSE SERPL-MCNC: 88 MG/DL (ref 65–99)
HCT VFR BLD AUTO: 37.4 % (ref 37.5–51)
HDLC SERPL-MCNC: 33 MG/DL (ref 40–60)
HGB BLD-MCNC: 12.5 G/DL (ref 13–17.7)
IMM GRANULOCYTES # BLD AUTO: 0.01 10*3/MM3 (ref 0–0.05)
IMM GRANULOCYTES NFR BLD AUTO: 0.1 % (ref 0–0.5)
LDLC SERPL CALC-MCNC: 82 MG/DL (ref 0–100)
LDLC/HDLC SERPL: 2.35 {RATIO}
LYMPHOCYTES # BLD AUTO: 1.85 10*3/MM3 (ref 0.7–3.1)
LYMPHOCYTES NFR BLD AUTO: 26.1 % (ref 19.6–45.3)
Lab: ABNORMAL
MCH RBC QN AUTO: 31.7 PG (ref 26.6–33)
MCHC RBC AUTO-ENTMCNC: 33.4 G/DL (ref 31.5–35.7)
MCV RBC AUTO: 94.9 FL (ref 79–97)
MDMA UR QL SCN: NEGATIVE
METHADONE UR QL SCN: NEGATIVE
MONOCYTES # BLD AUTO: 0.51 10*3/MM3 (ref 0.1–0.9)
MONOCYTES NFR BLD AUTO: 7.2 % (ref 5–12)
NEUTROPHILS NFR BLD AUTO: 4.49 10*3/MM3 (ref 1.7–7)
NEUTROPHILS NFR BLD AUTO: 63.4 % (ref 42.7–76)
OPIATES UR QL: NEGATIVE
OXYCODONE UR QL SCN: NEGATIVE
PCP UR QL SCN: NEGATIVE
PLATELET # BLD AUTO: 268 10*3/MM3 (ref 140–450)
PMV BLD AUTO: 9.6 FL (ref 6–12)
POTASSIUM SERPL-SCNC: 4.1 MMOL/L (ref 3.5–5.2)
PROT SERPL-MCNC: 7 G/DL (ref 6–8.5)
PSA SERPL-MCNC: 1.01 NG/ML (ref 0–4)
RBC # BLD AUTO: 3.94 10*6/MM3 (ref 4.14–5.8)
SODIUM SERPL-SCNC: 138 MMOL/L (ref 136–145)
TRIGL SERPL-MCNC: 178 MG/DL (ref 0–150)
VLDLC SERPL-MCNC: 31 MG/DL (ref 5–40)
WBC NRBC COR # BLD: 7.09 10*3/MM3 (ref 3.4–10.8)

## 2023-05-17 PROCEDURE — 3080F DIAST BP >= 90 MM HG: CPT | Performed by: FAMILY MEDICINE

## 2023-05-17 PROCEDURE — 99214 OFFICE O/P EST MOD 30 MIN: CPT | Performed by: FAMILY MEDICINE

## 2023-05-17 PROCEDURE — 1160F RVW MEDS BY RX/DR IN RCRD: CPT | Performed by: FAMILY MEDICINE

## 2023-05-17 PROCEDURE — 36415 COLL VENOUS BLD VENIPUNCTURE: CPT

## 2023-05-17 PROCEDURE — 80053 COMPREHEN METABOLIC PANEL: CPT

## 2023-05-17 PROCEDURE — 1159F MED LIST DOCD IN RCRD: CPT | Performed by: FAMILY MEDICINE

## 2023-05-17 PROCEDURE — 3077F SYST BP >= 140 MM HG: CPT | Performed by: FAMILY MEDICINE

## 2023-05-17 PROCEDURE — 85025 COMPLETE CBC W/AUTO DIFF WBC: CPT

## 2023-05-17 PROCEDURE — G0103 PSA SCREENING: HCPCS

## 2023-05-17 PROCEDURE — 80061 LIPID PANEL: CPT

## 2023-05-17 PROCEDURE — 80305 DRUG TEST PRSMV DIR OPT OBS: CPT | Performed by: FAMILY MEDICINE

## 2023-05-17 RX ORDER — PANTOPRAZOLE SODIUM 40 MG/1
40 TABLET, DELAYED RELEASE ORAL 2 TIMES DAILY
Qty: 60 TABLET | Refills: 5 | Status: SHIPPED | OUTPATIENT
Start: 2023-05-17

## 2023-05-17 RX ORDER — GABAPENTIN 300 MG/1
300 CAPSULE ORAL 3 TIMES DAILY
COMMUNITY
Start: 2023-02-14

## 2023-05-17 NOTE — ASSESSMENT & PLAN NOTE
"Stable on fluoxetine 20 mg daily.  Sx are generally well controlled.  He is still somewhat stressed out about Zenobia's health issues.  She \"has good days and bad days.\"  He does not desire a change in his meds at this time.  Continue to monitor.  "

## 2023-05-17 NOTE — ASSESSMENT & PLAN NOTE
BP is running at goal at home.  Stable on metoprolol succinate 25 mg daily.  Continue to monitor at home.  No refills needed today.  Checking labs.

## 2023-05-17 NOTE — ASSESSMENT & PLAN NOTE
Stable on current regimen.  Symptoms are well controlled.  No adverse effects. He does require ongoing use of this controlled substance to function.  Tox screen was due today.  Prior tox screen appropriate.  ANGEL was run today. Contract updated.  Refills were not needed today.  RTC 3 months.

## 2023-05-17 NOTE — PROGRESS NOTES
"Chief Complaint  ADD    Subjective          Corey Velasquez presents to [facility] today for f/u of routine problems.    He is on Adderall XR for ADD.  Has been stable on this regimen for well over a decade now.  Symptoms of focus and concentration have been well controlled.  No problems with adverse effects.  He was remotely diagnosed by Dr. Zeng Psychiatry.  He does not take drug holidays.    He does have chronic low back pain with left-sided sciatica.    He has been doing a home exercise program.  He is following with Dr. Ceballos (saw Dr. Banks back in July).  S/p PT and he does feel better today compared to his visit.  He has gotten some injections as well.  He is continuing to do the exercises at home.  He did get some gabapentin but is not taking it regularly because it \"makes me mean!\"  He does take it if the back is very bad, but otherwise avoids taking it.    He is on metoprolol succinate for hypertension.  His blood pressure has been well controlled at home.  No problems with chest pain, palpitations or shortness of breath.  He follows with Cardiology.      He is on pantoprazole with Zofran as needed for his gastritis and Clark's esophagus.  EGD done within the last year by Dr. Feldman showed strictures and severe gastritis as well as Clark's esophagus.  Repeat scope has shown some improvement.      He is on fluoxetine for depression.  Mood has been \"okay.\"  His symptoms are well controlled.  No problems with depressed mood or anhedonia, anxiety or panic attacks.      Current Outpatient Medications:   •  gabapentin (NEURONTIN) 300 MG capsule, Take 1 capsule by mouth 3 (Three) Times a Day., Disp: , Rfl:   •  pantoprazole (PROTONIX) 40 MG EC tablet, Take 1 tablet by mouth 2 (Two) Times a Day., Disp: 60 tablet, Rfl: 5  •  amphetamine-dextroamphetamine XR (ADDERALL XR) 30 MG 24 hr capsule, Take 1 capsule by mouth Every Morning, Disp: 30 capsule, Rfl: 0  •  FLUoxetine " "(PROzac) 40 MG capsule, Take 1 capsule by mouth Daily., Disp: 90 capsule, Rfl: 1  •  metoprolol succinate XL (TOPROL-XL) 25 MG 24 hr tablet, Take 1 tablet by mouth Daily., Disp: 90 tablet, Rfl: 1  •  tiZANidine (ZANAFLEX) 2 MG tablet, Take 1 tablet by mouth 2 (Two) Times a Day As Needed for Muscle Spasms. (Patient taking differently: Take 2 mg by mouth As Needed for Muscle Spasms.), Disp: 20 tablet, Rfl: 0    Allergies:  Patient has no known allergies.      Objective   Vital Signs:   Vitals:    05/17/23 0820   BP: 164/90   BP Location: Left arm   Patient Position: Sitting   Cuff Size: Adult   Pulse: 85   Temp: 98.9 °F (37.2 °C)   TempSrc: Oral   SpO2: 97%   Weight: 94.3 kg (208 lb)   Height: 170.2 cm (67.01\")       Physical Exam  Vitals reviewed.   Constitutional:       General: He is not in acute distress.     Appearance: Normal appearance. He is well-developed.   HENT:      Head: Normocephalic and atraumatic.      Right Ear: External ear normal.      Left Ear: External ear normal.   Eyes:      Extraocular Movements: Extraocular movements intact.      Conjunctiva/sclera: Conjunctivae normal.      Pupils: Pupils are equal, round, and reactive to light.   Cardiovascular:      Rate and Rhythm: Normal rate and regular rhythm.      Heart sounds: No murmur heard.  Pulmonary:      Effort: Pulmonary effort is normal.      Breath sounds: Normal breath sounds. No wheezing, rhonchi or rales.   Abdominal:      General: Bowel sounds are normal. There is no distension.      Palpations: Abdomen is soft.      Tenderness: There is no abdominal tenderness.   Musculoskeletal:         General: Normal range of motion.   Neurological:      Mental Status: He is alert.   Psychiatric:         Mood and Affect: Affect normal.          Lab Results   Component Value Date    GLUCOSE 92 11/09/2022    BUN 9 11/09/2022    CREATININE 0.81 11/09/2022    EGFRIFNONA 99 11/23/2021    BCR 11.1 11/09/2022    K 4.3 11/09/2022    CO2 28.0 11/09/2022    " "CALCIUM 8.9 11/09/2022    ALBUMIN 4.00 11/09/2022    LABIL2 1.2 (L) 08/10/2020    AST 17 11/09/2022    ALT 11 11/09/2022       Lab Results   Component Value Date    CHOL 173 11/09/2022    CHLPL 173 08/10/2020    TRIG 76 11/09/2022    HDL 43 11/09/2022     (H) 11/09/2022            Assessment and Plan    Diagnoses and all orders for this visit:    1. Attention deficit disorder (ADD) without hyperactivity (Primary)  Assessment & Plan:  Stable on current regimen.  Symptoms are well controlled.  No adverse effects. He does require ongoing use of this controlled substance to function.  Tox screen was due today.  Prior tox screen appropriate.  ANGEL was run today. Contract updated.  Refills were not needed today.  RTC 3 months.        2. High risk medication use  -     POC Urine Drug Screen [facility]    3. Hypertension, essential  Assessment & Plan:  BP is running at goal at home.  Stable on metoprolol succinate 25 mg daily.  Continue to monitor at home.  No refills needed today.  Checking labs.    Orders:  -     Comprehensive Metabolic Panel; Future  -     Lipid Panel; Future  -     CBC Auto Differential; Future    4. Mild episode of recurrent major depressive disorder  Assessment & Plan:  Stable on fluoxetine 20 mg daily.  Sx are generally well controlled.  He is still somewhat stressed out about Zenobia's health issues.  She \"has good days and bad days.\"  He does not desire a change in his meds at this time.  Continue to monitor.      5. Acute superficial gastritis without hemorrhage  Assessment & Plan:  Stable on pantoprazole 40 mg daily.  Continue to monitor.  Wean PPI as able.    Orders:  -     pantoprazole (PROTONIX) 40 MG EC tablet; Take 1 tablet by mouth 2 (Two) Times a Day.  Dispense: 60 tablet; Refill: 5    6. Clark's esophagus without dysplasia  Overview:  As per gastritis plan.    Orders:  -     pantoprazole (PROTONIX) 40 MG EC tablet; Take 1 tablet by mouth 2 (Two) Times a Day.  Dispense: 60 " tablet; Refill: 5    7. Prostate cancer screening  -     PSA Screen; Future      Follow Up   Return in about 3 months (around 8/17/2023) for Recheck.  Patient was given instructions and counseling regarding his condition or for health maintenance advice. Please see specific information pulled into the AVS if appropriate.           02/10/2023

## 2023-05-19 PROBLEM — R74.8 ELEVATED ALKALINE PHOSPHATASE LEVEL: Status: ACTIVE | Noted: 2023-05-19

## 2023-06-07 DIAGNOSIS — F98.8 ATTENTION DEFICIT DISORDER (ADD) WITHOUT HYPERACTIVITY: Chronic | ICD-10-CM

## 2023-06-07 NOTE — TELEPHONE ENCOUNTER
"     Caller: Corey Velasquez \"Herrera\"    Relationship: Self    Best call back number: [phone]    Requested Prescriptions:   Requested Prescriptions     Pending Prescriptions Disp Refills    amphetamine-dextroamphetamine XR (ADDERALL XR) 30 MG 24 hr capsule 30 capsule 0     Sig: Take 1 capsule by mouth Every Morning        Pharmacy where request should be sent: [facility] [address] [phone]  - [phone] FX     Last office visit with prescribing clinician: 5/17/2023   Last telemedicine visit with prescribing clinician: Visit date not found   Next office visit with prescribing clinician: 8/22/2023     Additional details provided by patient:     Does the patient have less than a 3 day supply:  [x] Yes  [] No    Would you like a call back once the refill request has been completed: [] Yes [x] No    If the office needs to give you a call back, can they leave a voicemail: [] Yes [x] No    Jeanmarie Cosme   06/07/23 12:18 EDT        "

## 2023-06-08 RX ORDER — DEXTROAMPHETAMINE SACCHARATE, AMPHETAMINE ASPARTATE MONOHYDRATE, DEXTROAMPHETAMINE SULFATE AND AMPHETAMINE SULFATE 7.5; 7.5; 7.5; 7.5 MG/1; MG/1; MG/1; MG/1
30 CAPSULE, EXTENDED RELEASE ORAL EVERY MORNING
Qty: 30 CAPSULE | Refills: 0 | Status: SHIPPED | OUTPATIENT
Start: 2023-06-08

## 2023-06-13 ENCOUNTER — TELEPHONE (OUTPATIENT)
Dept: FAMILY MEDICINE CLINIC | Age: 68
End: 2023-06-13

## 2023-06-13 NOTE — TELEPHONE ENCOUNTER
"    Caller: Corey Velasquez \"Herrera\"    Relationship: Self    Best call back number: [phone]    What is the medical concern/diagnosis: HIP/BACK PAIN     What specialty or service is being requested: PAIN CLINIC     What is the provider, practice or medical service name: [facility] [place]     Any additional details: PATIENT IS WANTING TO KNOW IF HE NEEDS TO COME IN FOR AN APPOINTMENT FIRST OR CAN THIS REFERRAL BE SENT WITHOUT HIM BEING SEEN IN OFFICE. PLEASE ADVISE      "

## 2023-06-13 NOTE — TELEPHONE ENCOUNTER
Typically, we'd need to get him in for an appt so that we can get an updated MRI, etc arranged.  However, since he has been going to a Pain Management clinic in [facility], he might be able to call down to the [place] to see if they will accept him with just a transfer of records from his current clinic.  Otherwise, I will be happy to see him to see about getting him set up with [facility].  Thanks, MARYELLEN

## 2023-06-23 PROBLEM — M43.16 SPONDYLOLISTHESIS OF LUMBAR REGION: Status: ACTIVE | Noted: 2023-06-23

## 2023-07-25 ENCOUNTER — TELEPHONE (OUTPATIENT)
Dept: FAMILY MEDICINE CLINIC | Age: 68
End: 2023-07-25

## 2023-07-25 DIAGNOSIS — M48.061 SPINAL STENOSIS OF LUMBAR REGION, UNSPECIFIED WHETHER NEUROGENIC CLAUDICATION PRESENT: ICD-10-CM

## 2023-07-25 DIAGNOSIS — G89.29 CHRONIC LEFT-SIDED LOW BACK PAIN WITH LEFT-SIDED SCIATICA: Primary | ICD-10-CM

## 2023-07-25 DIAGNOSIS — M54.42 CHRONIC LEFT-SIDED LOW BACK PAIN WITH LEFT-SIDED SCIATICA: Primary | ICD-10-CM

## 2023-07-25 NOTE — TELEPHONE ENCOUNTER
"  Caller: Corey Velasquez \"Herrera\"    Relationship: Self    Best call back number: [phone]    What is the best time to reach you: ANYTIME    Who are you requesting to speak with (clinical staff, provider,  specific staff member): SHANICE          What was the call regarding:       THE PATIENT SAID HE HAD AN APPOINTMENT WITH DR GRANADO AT [facility] AND IT WAS CANCELLED. HE IS WANTING TO KNOW IF HE NEEDS TO COME AND SEE PCP AARON OR REFER HIM TO SOMEONE       PLEASE ADVISE  "

## 2023-07-26 NOTE — TELEPHONE ENCOUNTER
Happy to get him in with Dr. Melchor if he is more comfortable with that.  Referral has been placed.  Thanks, BZHAYDEN

## 2023-07-26 NOTE — TELEPHONE ENCOUNTER
Pt states Dr Retana's office called and canceled his appt yesterday stating Dr Retana was out and did not reschedule because they did not know when she would be back.  Pt wants to know if you will make him a referral to Dr Melchor in [place] since he is the specialist.  He wants him to look at his MRI and he can tell him exactly what is wrong with his back.  Joseph does not want surg, but does want a specialist to look at his MRI.  Does not think pain management is a specialist in the spine.  Please advise.

## 2023-07-27 DIAGNOSIS — F33.0 MILD EPISODE OF RECURRENT MAJOR DEPRESSIVE DISORDER: ICD-10-CM

## 2023-07-27 RX ORDER — FLUOXETINE HYDROCHLORIDE 40 MG/1
CAPSULE ORAL
Qty: 90 CAPSULE | Refills: 1 | Status: SHIPPED | OUTPATIENT
Start: 2023-07-27

## 2023-08-07 DIAGNOSIS — F98.8 ATTENTION DEFICIT DISORDER (ADD) WITHOUT HYPERACTIVITY: Chronic | ICD-10-CM

## 2023-08-07 NOTE — TELEPHONE ENCOUNTER
"  Caller: Corey Velasquez \"Herrera\"    Relationship: Self    Best call back number: [phone]    Requested Prescriptions:   Requested Prescriptions     Pending Prescriptions Disp Refills    amphetamine-dextroamphetamine XR (ADDERALL XR) 30 MG 24 hr capsule 30 capsule 0     Sig: Take 1 capsule by mouth Every Morning        Pharmacy where request should be sent: [address] [phone] [facility] [phone] FX     Last office visit with prescribing clinician: 6/23/2023   Last telemedicine visit with prescribing clinician: Visit date not found   Next office visit with prescribing clinician: 8/22/2023     Additional details provided by patient:     Does the patient have less than a 3 day supply:  [x] Yes  [] No    Would you like a call back once the refill request has been completed: [] Yes [] No    If the office needs to give you a call back, can they leave a voicemail: [] Yes [] No    Jeanmarie Castellanos Rep   08/07/23 10:40 EDT       "

## 2023-08-08 RX ORDER — DEXTROAMPHETAMINE SACCHARATE, AMPHETAMINE ASPARTATE MONOHYDRATE, DEXTROAMPHETAMINE SULFATE AND AMPHETAMINE SULFATE 7.5; 7.5; 7.5; 7.5 MG/1; MG/1; MG/1; MG/1
30 CAPSULE, EXTENDED RELEASE ORAL EVERY MORNING
Qty: 30 CAPSULE | Refills: 0 | Status: SHIPPED | OUTPATIENT
Start: 2023-08-08

## 2023-08-15 ENCOUNTER — PATIENT OUTREACH (OUTPATIENT)
Dept: CASE MANAGEMENT | Facility: OTHER | Age: 68
End: 2023-08-15
Payer: MEDICARE

## 2023-08-15 NOTE — OUTREACH NOTE
"AMBULATORY CASE MANAGEMENT NOTE    Name and Relationship of Patient/Support Person: Corey Velasquez \"Herrera\" - Self    Herrera was identified on [facility] as presenting to the ER.  He was found to have a obstructing kidney stone.  Called to see if he has any needs.     Herrera was seen in ER for kidney stone, he was placed on Flomax x 10 days, he is not having any pain or problems in relation to the stone.    He denies need for ACM.      Shefali ADHIKARI  Ambulatory Case Management    8/15/2023, 11:35 EDT  "

## 2023-08-22 ENCOUNTER — OFFICE VISIT (OUTPATIENT)
Dept: FAMILY MEDICINE CLINIC | Age: 68
End: 2023-08-22
Payer: MEDICARE

## 2023-08-22 VITALS
OXYGEN SATURATION: 98 % | WEIGHT: 213.4 LBS | SYSTOLIC BLOOD PRESSURE: 150 MMHG | HEART RATE: 103 BPM | BODY MASS INDEX: 33.49 KG/M2 | DIASTOLIC BLOOD PRESSURE: 71 MMHG | HEIGHT: 67 IN

## 2023-08-22 DIAGNOSIS — F98.8 ATTENTION DEFICIT DISORDER (ADD) WITHOUT HYPERACTIVITY: Primary | Chronic | ICD-10-CM

## 2023-08-22 DIAGNOSIS — K29.00 ACUTE SUPERFICIAL GASTRITIS WITHOUT HEMORRHAGE: ICD-10-CM

## 2023-08-22 DIAGNOSIS — M54.42 CHRONIC LEFT-SIDED LOW BACK PAIN WITH LEFT-SIDED SCIATICA: ICD-10-CM

## 2023-08-22 DIAGNOSIS — I10 HYPERTENSION, ESSENTIAL: ICD-10-CM

## 2023-08-22 DIAGNOSIS — Z79.899 HIGH RISK MEDICATION USE: Chronic | ICD-10-CM

## 2023-08-22 DIAGNOSIS — F33.0 MILD EPISODE OF RECURRENT MAJOR DEPRESSIVE DISORDER: ICD-10-CM

## 2023-08-22 DIAGNOSIS — G89.29 CHRONIC LEFT-SIDED LOW BACK PAIN WITH LEFT-SIDED SCIATICA: ICD-10-CM

## 2023-08-22 PROCEDURE — 3078F DIAST BP <80 MM HG: CPT | Performed by: FAMILY MEDICINE

## 2023-08-22 PROCEDURE — 1160F RVW MEDS BY RX/DR IN RCRD: CPT | Performed by: FAMILY MEDICINE

## 2023-08-22 PROCEDURE — 80305 DRUG TEST PRSMV DIR OPT OBS: CPT | Performed by: FAMILY MEDICINE

## 2023-08-22 PROCEDURE — 99214 OFFICE O/P EST MOD 30 MIN: CPT | Performed by: FAMILY MEDICINE

## 2023-08-22 PROCEDURE — 3077F SYST BP >= 140 MM HG: CPT | Performed by: FAMILY MEDICINE

## 2023-08-22 PROCEDURE — 1159F MED LIST DOCD IN RCRD: CPT | Performed by: FAMILY MEDICINE

## 2023-08-22 RX ORDER — MAGNESIUM GLUCONATE 27 MG(500)
27 TABLET ORAL 2 TIMES DAILY
COMMUNITY

## 2023-08-22 NOTE — PROGRESS NOTES
"Chief Complaint  ADD (3 month f/u)    Subjective          Corey Velasquez presents to [facility] today for f/u of routine problems.    He is on Adderall XR for ADD.  Has been stable on this regimen for well over a decade now.  Symptoms of focus and concentration have been well controlled.  No problems with adverse effects.  He was remotely diagnosed by Dr. Zeng Psychiatry.  He does not take drug holidays.    He does have chronic low back pain with left-sided sciatica.  Has used gabapentin but it made him feel \"mean.\"  Follows with Dr. Ceballos ([facility], Spinal Surgery) and Dr. Retana ([facility], Pain Management).  S/p PT.  He is continuing to do the exercises at home.  He saw Dr. Retana last week for an epidural and \"it's the first time in years that I've been out of pain.\"  Also started him on magnesium gluconate.  He goes back in a couple of weeks.  He is following up in Dr. Melchor at the end of Sept.      He had a kidney stone two weeks ago.      He is on metoprolol succinate for HTN.  BP has been well controlled at home.  No chest pain, palpitations or shortness of breath.  He is following with Cardiology.      He is on pantoprazole with Zofran as needed for his gastritis and Clark's esophagus.  EGD done recently by Dr. Feldman showed strictures and severe gastritis as well as Clark's esophagus.  Repeat scope showed some improvement.      He is on fluoxetine for depression.  Mood has been \"fine.\"  Sx are well controlled.  No depressed mood or anhedonia, no anxiety or panic attacks.      Current Outpatient Medications:     amphetamine-dextroamphetamine XR (ADDERALL XR) 30 MG 24 hr capsule, Take 1 capsule by mouth Every Morning, Disp: 30 capsule, Rfl: 0    FLUoxetine (PROzac) 40 MG capsule, TAKE ONE (1) CAPSULE BY MOUTH DAILY., Disp: 90 capsule, Rfl: 1    magnesium gluconate (MAGONATE) 500 MG tablet, Take 1 tablet by mouth 2 (Two) Times a Day., Disp: , Rfl:     metoprolol " "succinate XL (TOPROL-XL) 25 MG 24 hr tablet, Take 1 tablet by mouth Daily., Disp: 90 tablet, Rfl: 1    pantoprazole (PROTONIX) 40 MG EC tablet, Take 1 tablet by mouth 2 (Two) Times a Day., Disp: 60 tablet, Rfl: 5    tiZANidine (ZANAFLEX) 2 MG tablet, Take 1 tablet by mouth 2 (Two) Times a Day As Needed for Muscle Spasms. (Patient taking differently: Take 1 tablet by mouth As Needed for Muscle Spasms.), Disp: 20 tablet, Rfl: 0    Allergies:  Patient has no known allergies.      Objective   Vital Signs:   Vitals:    08/22/23 0917 08/22/23 0945   BP: 143/89 150/71   BP Location: Left arm Right arm   Patient Position: Sitting Sitting   Cuff Size: Large Adult    Pulse: 94 103   SpO2: 98%    Weight: 96.8 kg (213 lb 6.4 oz)    Height: 170.2 cm (67.01\")        Physical Exam  Vitals reviewed.   Constitutional:       General: He is not in acute distress.     Appearance: Normal appearance. He is well-developed.   HENT:      Head: Normocephalic and atraumatic.      Right Ear: External ear normal.      Left Ear: External ear normal.   Eyes:      Extraocular Movements: Extraocular movements intact.      Conjunctiva/sclera: Conjunctivae normal.      Pupils: Pupils are equal, round, and reactive to light.   Cardiovascular:      Rate and Rhythm: Normal rate and regular rhythm.      Heart sounds: No murmur heard.  Pulmonary:      Effort: Pulmonary effort is normal.      Breath sounds: Normal breath sounds. No wheezing, rhonchi or rales.   Abdominal:      General: Bowel sounds are normal. There is no distension.      Palpations: Abdomen is soft.      Tenderness: There is no abdominal tenderness.   Musculoskeletal:         General: Normal range of motion.   Neurological:      Mental Status: He is alert.   Psychiatric:         Mood and Affect: Mood and affect normal.         Behavior: Behavior normal.         Thought Content: Thought content normal.         Judgment: Judgment normal.        Lab Results   Component Value Date    GLUCOSE " 88 05/17/2023    BUN 8 05/17/2023    CREATININE 0.77 05/17/2023    EGFRIFNONA 99 11/23/2021    BCR 10.4 05/17/2023    K 4.1 05/17/2023    CO2 29.0 05/17/2023    CALCIUM 8.9 05/17/2023    ALBUMIN 4.0 05/17/2023    LABIL2 1.2 (L) 08/10/2020    AST 24 05/17/2023    ALT 14 05/17/2023       Lab Results   Component Value Date    CHOL 146 05/17/2023    CHLPL 173 08/10/2020    TRIG 178 (H) 05/17/2023    HDL 33 (L) 05/17/2023    LDL 82 05/17/2023            Assessment and Plan    Diagnoses and all orders for this visit:    1. Attention deficit disorder (ADD) without hyperactivity (Primary)  Assessment & Plan:  Stable on current regimen.  Symptoms are well controlled.  No adverse effects. He does require ongoing use of this controlled substance to function.  Tox screen was due today.  Prior tox screen appropriate.  ANGEL was run today.  Refills were not needed today.  RTC 3 months.        2. High risk medication use  -     POC Urine Drug Screen [facility]    3. Chronic left-sided low back pain with left-sided sciatica  Assessment & Plan:  Stable on tizanidine as needed.  He is now following with Dr. Kellen Cueva Pain Management, and he got an epidural last week that is working extremely well for him.  Continue to monitor.  He is following up with Dr. Melchor Spinal Surgery at the end of next month.      4. Hypertension, essential  Assessment & Plan:  Blood pressure has been running at goal.  Continue metoprolol succinate 25 mg daily.  Refills were not needed today.  Labs were not needed today.  Continue to monitor.        5. Mild episode of recurrent major depressive disorder  Assessment & Plan:  Stable on fluoxetine 40 mg daily.  Refills were not needed today.  Continue to monitor.        6. Acute superficial gastritis without hemorrhage  Assessment & Plan:  Stable on pantoprazole 40 mg mg twice daily.  History of Clark's esophagus and stricture on recent EGD.  Refills were not needed today.  Continue to monitor.  Wean  PPI as able.            Follow Up   Return in about 3 months (around 11/22/2023) for Medicare Wellness.  Patient was given instructions and counseling regarding his condition or for health maintenance advice. Please see specific information pulled into the AVS if appropriate.           02/10/2023

## 2023-08-22 NOTE — ASSESSMENT & PLAN NOTE
Stable on pantoprazole 40 mg mg twice daily.  History of Clark's esophagus and stricture on recent EGD.  Refills were not needed today.  Continue to monitor.  Wean PPI as able.

## 2023-08-22 NOTE — ASSESSMENT & PLAN NOTE
Stable on tizanidine as needed.  He is now following with Dr. Kellen Cueva Pain Management, and he got an epidural last week that is working extremely well for him.  Continue to monitor.  He is following up with Dr. Melchor Spinal Surgery at the end of next month.

## 2023-08-22 NOTE — ASSESSMENT & PLAN NOTE
Blood pressure has been running at goal.  Continue metoprolol succinate 25 mg daily.  Refills were not needed today.  Labs were not needed today.  Continue to monitor.

## 2023-08-28 DIAGNOSIS — F98.8 ATTENTION DEFICIT DISORDER (ADD) WITHOUT HYPERACTIVITY: Chronic | ICD-10-CM

## 2023-08-29 RX ORDER — DEXTROAMPHETAMINE SACCHARATE, AMPHETAMINE ASPARTATE MONOHYDRATE, DEXTROAMPHETAMINE SULFATE AND AMPHETAMINE SULFATE 7.5; 7.5; 7.5; 7.5 MG/1; MG/1; MG/1; MG/1
30 CAPSULE, EXTENDED RELEASE ORAL EVERY MORNING
Qty: 30 CAPSULE | Refills: 0 | OUTPATIENT
Start: 2023-08-29

## 2023-09-09 DIAGNOSIS — F98.8 ATTENTION DEFICIT DISORDER (ADD) WITHOUT HYPERACTIVITY: Chronic | ICD-10-CM

## 2023-09-09 RX ORDER — DEXTROAMPHETAMINE SACCHARATE, AMPHETAMINE ASPARTATE MONOHYDRATE, DEXTROAMPHETAMINE SULFATE AND AMPHETAMINE SULFATE 7.5; 7.5; 7.5; 7.5 MG/1; MG/1; MG/1; MG/1
30 CAPSULE, EXTENDED RELEASE ORAL EVERY MORNING
Qty: 30 CAPSULE | Refills: 0 | Status: CANCELLED | OUTPATIENT
Start: 2023-09-09

## 2023-09-12 RX ORDER — DEXTROAMPHETAMINE SACCHARATE, AMPHETAMINE ASPARTATE MONOHYDRATE, DEXTROAMPHETAMINE SULFATE AND AMPHETAMINE SULFATE 7.5; 7.5; 7.5; 7.5 MG/1; MG/1; MG/1; MG/1
30 CAPSULE, EXTENDED RELEASE ORAL EVERY MORNING
Qty: 30 CAPSULE | Refills: 0 | Status: SHIPPED | OUTPATIENT
Start: 2023-09-12

## 2023-10-10 DIAGNOSIS — F98.8 ATTENTION DEFICIT DISORDER (ADD) WITHOUT HYPERACTIVITY: Chronic | ICD-10-CM

## 2023-10-10 RX ORDER — DEXTROAMPHETAMINE SACCHARATE, AMPHETAMINE ASPARTATE MONOHYDRATE, DEXTROAMPHETAMINE SULFATE AND AMPHETAMINE SULFATE 7.5; 7.5; 7.5; 7.5 MG/1; MG/1; MG/1; MG/1
30 CAPSULE, EXTENDED RELEASE ORAL EVERY MORNING
Qty: 30 CAPSULE | Refills: 0 | Status: SHIPPED | OUTPATIENT
Start: 2023-10-10

## 2023-10-10 NOTE — TELEPHONE ENCOUNTER
"     Caller: Corey Velasquez \"Herrera\"    Relationship: Self    Best call back number: [phone]    Requested Prescriptions:   Requested Prescriptions     Pending Prescriptions Disp Refills    amphetamine-dextroamphetamine XR (ADDERALL XR) 30 MG 24 hr capsule 30 capsule 0     Sig: Take 1 capsule by mouth Every Morning        Pharmacy where request should be sent: [facility] - [address] [phone]  - [phone]     Last office visit with prescribing clinician: 8/22/2023   Last telemedicine visit with prescribing clinician: Visit date not found   Next office visit with prescribing clinician: 12/14/2023     Additional details provided by patient:      Does the patient have less than a 3 day supply:  [] Yes  [] No    Would you like a call back once the refill request has been completed: [] Yes [x] No    If the office needs to give you a call back, can they leave a voicemail: [] Yes [x] No    Jeanmarie Cosme Rep   10/10/23 14:34 EDT        "

## 2023-11-08 ENCOUNTER — TELEPHONE (OUTPATIENT)
Dept: FAMILY MEDICINE CLINIC | Age: 68
End: 2023-11-08

## 2023-11-08 DIAGNOSIS — F98.8 ATTENTION DEFICIT DISORDER (ADD) WITHOUT HYPERACTIVITY: Chronic | ICD-10-CM

## 2023-11-08 RX ORDER — DEXTROAMPHETAMINE SACCHARATE, AMPHETAMINE ASPARTATE MONOHYDRATE, DEXTROAMPHETAMINE SULFATE AND AMPHETAMINE SULFATE 7.5; 7.5; 7.5; 7.5 MG/1; MG/1; MG/1; MG/1
30 CAPSULE, EXTENDED RELEASE ORAL EVERY MORNING
Qty: 30 CAPSULE | Refills: 0 | Status: SHIPPED | OUTPATIENT
Start: 2023-11-08

## 2023-11-08 NOTE — TELEPHONE ENCOUNTER
"Caller: Corey Velasquez \"Herrera\"    Relationship: Self    Best call back number:     [phone]       Requested Prescriptions:   Requested Prescriptions     Pending Prescriptions Disp Refills    amphetamine-dextroamphetamine XR (ADDERALL XR) 30 MG 24 hr capsule 30 capsule 0     Sig: Take 1 capsule by mouth Every Morning        Pharmacy where request should be sent: [facility] [address] [phone]  - [phone] FX     Last office visit with prescribing clinician: 8/22/2023   Last telemedicine visit with prescribing clinician: Visit date not found   Next office visit with prescribing clinician: 12/14/2023     Additional details provided by patient:     Does the patient have less than a 3 day supply:  [x] Yes  [] No    Would you like a call back once the refill request has been completed: [] Yes [x] No    If the office needs to give you a call back, can they leave a voicemail: [] Yes [x] No    Jeanmarie Pierce Rep   11/08/23 09:06 EST         "

## 2023-12-11 ENCOUNTER — TELEPHONE (OUTPATIENT)
Dept: FAMILY MEDICINE CLINIC | Age: 68
End: 2023-12-11

## 2023-12-11 DIAGNOSIS — F98.8 ATTENTION DEFICIT DISORDER (ADD) WITHOUT HYPERACTIVITY: Chronic | ICD-10-CM

## 2023-12-11 NOTE — TELEPHONE ENCOUNTER
"  Caller: Corey Velasquez \"Herrera\"    Relationship: Self    Best call back number: [phone]    Requested Prescriptions:   Requested Prescriptions     Pending Prescriptions Disp Refills    amphetamine-dextroamphetamine XR (ADDERALL XR) 30 MG 24 hr capsule 30 capsule 0     Sig: Take 1 capsule by mouth Every Morning        Pharmacy where request should be sent: [facility] - [address] [phone]  - [phone] FX     Last office visit with prescribing clinician: 8/22/2023   Last telemedicine visit with prescribing clinician: Visit date not found   Next office visit with prescribing clinician: 12/14/2023       Does the patient have less than a 3 day supply:  [x] Yes  [] No      Jeanmarie David Rep   12/11/23 10:38 EST           "

## 2023-12-12 RX ORDER — DEXTROAMPHETAMINE SACCHARATE, AMPHETAMINE ASPARTATE MONOHYDRATE, DEXTROAMPHETAMINE SULFATE AND AMPHETAMINE SULFATE 7.5; 7.5; 7.5; 7.5 MG/1; MG/1; MG/1; MG/1
30 CAPSULE, EXTENDED RELEASE ORAL EVERY MORNING
Qty: 30 CAPSULE | Refills: 0 | Status: SHIPPED | OUTPATIENT
Start: 2023-12-12 | End: 2024-01-08 | Stop reason: SDUPTHER

## 2023-12-14 ENCOUNTER — OFFICE VISIT (OUTPATIENT)
Dept: FAMILY MEDICINE CLINIC | Age: 68
End: 2023-12-14
Payer: MEDICARE

## 2023-12-14 VITALS
SYSTOLIC BLOOD PRESSURE: 125 MMHG | HEIGHT: 67 IN | DIASTOLIC BLOOD PRESSURE: 85 MMHG | BODY MASS INDEX: 33.43 KG/M2 | OXYGEN SATURATION: 97 % | HEART RATE: 99 BPM | WEIGHT: 213 LBS

## 2023-12-14 DIAGNOSIS — F33.0 MILD EPISODE OF RECURRENT MAJOR DEPRESSIVE DISORDER: ICD-10-CM

## 2023-12-14 DIAGNOSIS — K22.70 BARRETT'S ESOPHAGUS WITHOUT DYSPLASIA: ICD-10-CM

## 2023-12-14 DIAGNOSIS — Z87.891 PERSONAL HISTORY OF TOBACCO USE, PRESENTING HAZARDS TO HEALTH: ICD-10-CM

## 2023-12-14 DIAGNOSIS — M54.42 CHRONIC LEFT-SIDED LOW BACK PAIN WITH LEFT-SIDED SCIATICA: ICD-10-CM

## 2023-12-14 DIAGNOSIS — I10 HYPERTENSION, ESSENTIAL: ICD-10-CM

## 2023-12-14 DIAGNOSIS — Z79.899 HIGH RISK MEDICATION USE: Chronic | ICD-10-CM

## 2023-12-14 DIAGNOSIS — F98.8 ATTENTION DEFICIT DISORDER (ADD) WITHOUT HYPERACTIVITY: Chronic | ICD-10-CM

## 2023-12-14 DIAGNOSIS — Z23 ENCOUNTER FOR IMMUNIZATION: ICD-10-CM

## 2023-12-14 DIAGNOSIS — Z00.00 ANNUAL PHYSICAL EXAM: Primary | ICD-10-CM

## 2023-12-14 DIAGNOSIS — G89.29 CHRONIC LEFT-SIDED LOW BACK PAIN WITH LEFT-SIDED SCIATICA: ICD-10-CM

## 2023-12-14 LAB
AMPHET+METHAMPHET UR QL: POSITIVE
AMPHETAMINES UR QL: NEGATIVE
BARBITURATES UR QL SCN: NEGATIVE
BENZODIAZ UR QL SCN: NEGATIVE
BUPRENORPHINE SERPL-MCNC: NEGATIVE NG/ML
CANNABINOIDS SERPL QL: NEGATIVE
COCAINE UR QL: NEGATIVE
EXPIRATION DATE: ABNORMAL
Lab: ABNORMAL
MDMA UR QL SCN: NEGATIVE
METHADONE UR QL SCN: NEGATIVE
MORPHINE/OPIATES SCREEN, URINE: NEGATIVE
OXYCODONE UR QL SCN: NEGATIVE
PCP UR QL SCN: NEGATIVE

## 2023-12-14 RX ORDER — FLUOXETINE HYDROCHLORIDE 40 MG/1
40 CAPSULE ORAL DAILY
Qty: 90 CAPSULE | Refills: 1 | Status: SHIPPED | OUTPATIENT
Start: 2023-12-14

## 2023-12-14 RX ORDER — METOPROLOL SUCCINATE 25 MG/1
25 TABLET, EXTENDED RELEASE ORAL DAILY
Qty: 90 TABLET | Refills: 1 | Status: SHIPPED | OUTPATIENT
Start: 2023-12-14

## 2023-12-14 NOTE — PROGRESS NOTES
The ABCs of the Annual Wellness Visit  Subsequent Medicare Wellness Visit    Subjective    Corey Velasquez is a 68 y.o. male who presents for a Subsequent Medicare Wellness Visit.    He is due for colonoscopy, last done 9/2018.  This had poor prep but was found to be normal.  Five year repeat recommended; he has already talked about this with Dr. Feldman.  He is UTD on prostate cancer screening.  He is UTD on AAA screen, done 8/2023 and this was negative for AAA.  He is due for low-dose CT chest for lung cancer screening (60 pack-years, quit 11 years ago).  He is UTD on [id number] (11/2022), Shingrix (12/2018), Tdap (10/2019).  He is due for COVID, [id number], Shingrix #2, flu.  He is UTD on routine labs.     The following portions of the patient's history were reviewed and   updated as appropriate: allergies, current medications, past family history, past medical history, past social history, past surgical history, and problem list.    Compared to one year ago, the patient feels his physical   health is the same.    Compared to one year ago, the patient feels his mental   health is the same.    Recent Hospitalizations:  He was not admitted to the hospital during the last year.       Current Medical Providers:  Patient Care Team:  Cleopatra Higginbotham MD as PCP - General (Family Medicine)    Outpatient Medications Prior to Visit   Medication Sig Dispense Refill    amphetamine-dextroamphetamine XR (ADDERALL XR) 30 MG 24 hr capsule Take 1 capsule by mouth Every Morning 30 capsule 0    magnesium gluconate (MAGONATE) 500 MG tablet Take 1 tablet by mouth 2 (Two) Times a Day.      pantoprazole (PROTONIX) 40 MG EC tablet Take 1 tablet by mouth 2 (Two) Times a Day. 60 tablet 5    tiZANidine (ZANAFLEX) 2 MG tablet Take 1 tablet by mouth 2 (Two) Times a Day As Needed for Muscle Spasms. (Patient taking differently: Take 1 tablet by mouth As Needed for Muscle Spasms.) 20 tablet 0    FLUoxetine (PROzac) 40 MG capsule TAKE ONE  "(1) CAPSULE BY MOUTH DAILY. 90 capsule 1    metoprolol succinate XL (TOPROL-XL) 25 MG 24 hr tablet Take 1 tablet by mouth Daily. 90 tablet 1     No facility-administered medications prior to visit.       No opioid medication identified on active medication list. I have reviewed chart for other potential  high risk medication/s and harmful drug interactions in the elderly.        Aspirin is not on active medication list.  Aspirin use is not indicated based on review of current medical condition/s. Risk of harm outweighs potential benefits.  .    Patient Active Problem List   Diagnosis    Hypertension, essential    Attention deficit disorder (ADD) without hyperactivity    High risk medication use    Mild episode of recurrent major depressive disorder    Generalized osteoarthritis    Chronic pain of left knee    Annual physical exam    Failed total knee arthroplasty    Other fatigue    Iron deficiency anemia    Acute superficial gastritis without hemorrhage    Clark's esophagus without dysplasia    Chronic left-sided low back pain with left-sided sciatica    Elevated alkaline phosphatase level    Spondylolisthesis of lumbar region     Advance Care Planning   Advance Care Planning     Advance Directive is not on file.  ACP discussion was held with the patient during this visit. Patient has an advance directive (not in EMR), copy requested.     Objective    Vitals:    12/14/23 1017   BP: 125/85   BP Location: Left arm   Patient Position: Sitting   Cuff Size: Large Adult   Pulse: 99   SpO2: 97%   Weight: 96.6 kg (213 lb)   Height: 170.2 cm (67.01\")     Estimated body mass index is 33.35 kg/m² as calculated from the following:    Height as of this encounter: 170.2 cm (67.01\").    Weight as of this encounter: 96.6 kg (213 lb).    BMI is >= 30 and <35. (Class 1 Obesity). The following options were offered after discussion;: exercise counseling/recommendations and nutrition counseling/recommendations      Does the patient " have evidence of cognitive impairment? No          HEALTH RISK ASSESSMENT    Smoking Status:  Social History     Tobacco Use   Smoking Status Former    Packs/day: 2.00    Years: 30.00    Additional pack years: 0.00    Total pack years: 60.00    Types: Cigarettes    Quit date: 1/15/2012    Years since quittin.9   Smokeless Tobacco Never     Alcohol Consumption:  Social History     Substance and Sexual Activity   Alcohol Use Not Currently     Fall Risk Screen:    ARASELI Fall Risk Assessment was completed, and patient is at LOW risk for falls.Assessment completed on:2023    Depression Screenin/14/2023    10:26 AM   PHQ-2/PHQ-9 Depression Screening   Little Interest or Pleasure in Doing Things 0-->not at all   Feeling Down, Depressed or Hopeless 0-->not at all   PHQ-9: Brief Depression Severity Measure Score 0       Health Habits and Functional and Cognitive Screenin/14/2023    10:27 AM   Functional & Cognitive Status   Do you have difficulty preparing food and eating? No   Do you have difficulty bathing yourself, getting dressed or grooming yourself? No   Do you have difficulty using the toilet? No   Do you have difficulty moving around from place to place? Yes   Do you have trouble with steps or getting out of a bed or a chair? Yes   Current Diet Well Balanced Diet   Dental Exam Up to date   Eye Exam Up to date   Exercise (times per week) 5 times per week   Current Exercises Include Walking   Do you need help using the phone?  No   Are you deaf or do you have serious difficulty hearing?  No   Do you need help to go to places out of walking distance? No   Do you need help shopping? No   Do you need help preparing meals?  No   Do you need help with housework?  No   Do you need help with laundry? No   Do you need help taking your medications? No   Do you need help managing money? No   Do you ever drive or ride in a car without wearing a seat belt? No   Have you felt unusual stress, anger or  loneliness in the last month? No   Who do you live with? Spouse   If you need help, do you have trouble finding someone available to you? No   Have you been bothered in the last four weeks by sexual problems? No   Do you have difficulty concentrating, remembering or making decisions? Yes       Age-appropriate Screening Schedule:  Refer to the list below for future screening recommendations based on patient's age, sex and/or medical conditions. Orders for these recommended tests are listed in the plan section. The patient has been provided with a written plan.    Health Maintenance   Topic Date Due    LUNG CANCER SCREENING  Never done    ZOSTER VACCINE (2 of 2) 11/29/2018    COLORECTAL CANCER SCREENING  02/09/2023    INFLUENZA VACCINE  08/01/2023    COVID-19 Vaccine (5 - 2023-24 season) 09/01/2023    ANNUAL WELLNESS VISIT  12/14/2024    BMI FOLLOWUP  12/14/2024    TDAP/TD VACCINES (2 - Td or Tdap) 10/01/2029    HEPATITIS C SCREENING  Completed    Pneumococcal Vaccine 65+  Completed    AAA SCREEN (ONE-TIME)  Completed                  CMS Preventative Services Quick Reference  Risk Factors Identified During Encounter  Immunizations Discussed/Encouraged: Influenza, Shingrix, and COVID19  The above risks/problems have been discussed with the patient.  Pertinent information has been shared with the patient in the After Visit Summary.  An After Visit Summary and PPPS were made available to the patient.    Follow Up:   Next Medicare Wellness visit to be scheduled in 1 year.       Additional E&M Note during same encounter follows:  Patient has multiple medical problems which are significant and separately identifiable that require additional work above and beyond the Medicare Wellness Visit.      Chief Complaint  Medicare Wellness-subsequent    Subjective        HPI  Corey EDUARDO Velasquez is also being seen today for routine f/u of chronic issues.    He is on Adderall XR for ADD.  He has used this for well many, many years now.   "Concentration and focus have been well controlled.  Denies adverse effects.  He was originally diagnosed by Dr. Zeng Psychiat[facility].  He does not take drug holidays.     He is on magnesium gluconate for chronic low back pain with left-sided sciatica.  He has used gabapentin in the past (made him feel \"mean\").  He follows with Dr. Ceballos ([facility], Sp[facility]) and Dr. Retana ([facility], Pain Management).  Status post PT.  He does his home exercise program regularly.  He has been doing epidurals which have given him some good relief.  Lasted 2 months.  He has been getting some knee injections as well.  He has been using Tylenol.       He is on metoprolol succinate for hypertension.  Blood pressure has been well controlled at home.  Denies chest pain, palpitations or shortness of breath.  Follows with Cardiology.      He is on pantoprazole BID (planned decrease to once daily but when he tried to wean down so he went back up to twice daily) with Zofran as needed for his gastritis and Clark's esophagus.  EGD done last by Dr. Feldman showed strictures and severe gastritis as well as Clark's esophagus.  Repeat scope showed some improvement.      He is on fluoxetine for depression.  Mood has been \"fine.\"  His symptoms are well controlled.  Denies depressed mood or anhedonia, anxiety or panic attacks.       Review of Systems   Constitutional:  Positive for fatigue. Negative for chills and fever.   HENT:  Positive for hearing loss (Left ear). Negative for congestion and rhinorrhea.    Eyes:  Negative for pain and visual disturbance.   Respiratory:  Negative for cough and shortness of breath.    Cardiovascular:  Negative for chest pain and palpitations.   Gastrointestinal:  Negative for abdominal pain, constipation, diarrhea, nausea and vomiting.   Genitourinary:  Negative for difficulty urinating and dysuria.   Musculoskeletal:  Positive for arthralgias and back pain. Negative for myalgias.   Neurological:  Negative for " "weakness and numbness.   Psychiatric/Behavioral:  Negative for decreased concentration, dysphoric mood and sleep disturbance. The patient is not nervous/anxious.        Objective   Vital Signs:  /85 (BP Location: Left arm, Patient Position: Sitting, Cuff Size: Large Adult)   Pulse 99   Ht 170.2 cm (67.01\")   Wt 96.6 kg (213 lb)   SpO2 97%   BMI 33.35 kg/m²     Physical Exam  Vitals reviewed.   Constitutional:       General: He is not in acute distress.     Appearance: Normal appearance. He is well-developed.   HENT:      Head: Normocephalic and atraumatic.      Right Ear: External ear normal.      Left Ear: External ear normal.      Mouth/Throat:      Mouth: Mucous membranes are moist.   Eyes:      Extraocular Movements: Extraocular movements intact.      Conjunctiva/sclera: Conjunctivae normal.      Pupils: Pupils are equal, round, and reactive to light.   Cardiovascular:      Rate and Rhythm: Normal rate and regular rhythm.      Heart sounds: No murmur heard.  Pulmonary:      Effort: Pulmonary effort is normal.      Breath sounds: Normal breath sounds. No wheezing, rhonchi or rales.   Abdominal:      General: Bowel sounds are normal. There is no distension.      Palpations: Abdomen is soft.      Tenderness: There is no abdominal tenderness.   Musculoskeletal:         General: Normal range of motion.   Skin:     General: Skin is warm and dry.   Neurological:      Mental Status: He is alert and oriented to person, place, and time.      Deep Tendon Reflexes: Reflexes normal.   Psychiatric:         Mood and Affect: Mood and affect normal.         Behavior: Behavior normal.         Thought Content: Thought content normal.         Judgment: Judgment normal.                  Lab Results   Component Value Date    GLUCOSE 88 05/17/2023    BUN 8 05/17/2023    CREATININE 0.77 05/17/2023    EGFR 98.7 05/17/2023    BCR 10.4 05/17/2023    K 4.1 05/17/2023    CO2 29.0 05/17/2023    CALCIUM 8.9 05/17/2023    ALBUMIN " 4.0 05/17/2023    BILITOT 0.2 05/17/2023    AST 24 05/17/2023    ALT 14 05/17/2023       Lab Results   Component Value Date    CHOL 146 05/17/2023    CHLPL 173 08/10/2020    TRIG 178 (H) 05/17/2023    HDL 33 (L) 05/17/2023    LDL 82 05/17/2023       Lab Results   Component Value Date    WBC 7.09 05/17/2023    HGB 12.5 (L) 05/17/2023    HCT 37.4 (L) 05/17/2023    MCV 94.9 05/17/2023     05/17/2023          Assessment and Plan   Diagnoses and all orders for this visit:    1. Annual physical exam (Primary)  Assessment & Plan:  He is UTD on colonoscopy, last done 9/2018.  This had poor prep but was found to be normal.  Five year repeat recommended.  He is UTD on prostate cancer screening.  He is UTD on AAA screen, done 8/2023 and this was negative for AAA.  He is due for low-dose CT chest for lung cancer screening.  He is UTD on [id number] (11/2022), Shingrix (12/2018), Tdap (10/2019).  He is due for COVID, [id number], Shingrix #2, flu.  COVID and high dose flu given.  Shingrix can be done at the pharmacy.  He is UTD on routine labs.       2. Attention deficit disorder (ADD) without hyperactivity  Assessment & Plan:  Stable on current regimen.  Symptoms are well controlled.  No adverse effects. He does require ongoing use of this controlled substance to function.  Tox screen was due today.  Prior tox screen appropriate.  ANGEL was run today.  Refills were not needed today.  RTC 3 months.        3. High risk medication use  -     POC Medline 12 Panel Urine Drug Screen    4. Chronic left-sided low back pain with left-sided sciatica  Overview:  Following with Dr. Traci Retana at [facility].      5. Hypertension, essential  Assessment & Plan:  Blood pressure has been running at goal.  Continue metoprolol succinate 25 mg daily.  Refills were needed today.  Labs were not needed today.  Continue to monitor.      Orders:  -     metoprolol succinate XL (TOPROL-XL) 25 MG 24 hr tablet; Take 1 tablet by mouth  Daily.  Dispense: 90 tablet; Refill: 1    6. Clark's esophagus without dysplasia  Assessment & Plan:  Stable on pantoprazole 40 twice daily.  Refills were not needed today.  Continue to monitor.  Wean PPI as able; we are going to try going back down to once daily.        7. Mild episode of recurrent major depressive disorder  Assessment & Plan:  Stable on fluoxetine 40 mg daily.  Refills were needed today.  Continue to monitor.      Orders:  -     FLUoxetine (PROzac) 40 MG capsule; Take 1 capsule by mouth Daily.  Dispense: 90 capsule; Refill: 1    8. Encounter for immunization  -     Fluzone High-Dose 65+yrs  -     COVID-19 F23 (Pfizer) 12yrs+ (COMIRNATY)    9. Personal history of tobacco use, presenting hazards to health  -      CT Chest Low Dose Cancer Screening WO; Future               Follow Up   Return in about 3 months (around 3/14/2024) for Recheck.  Patient was given instructions and counseling regarding his condition or for health maintenance advice. Please see specific information pulled into the AVS if appropriate.

## 2023-12-14 NOTE — ASSESSMENT & PLAN NOTE
He is UTD on colonoscopy, last done 9/2018.  This had poor prep but was found to be normal.  Five year repeat recommended.  He is UTD on prostate cancer screening.  He is UTD on AAA screen, done 8/2023 and this was negative for AAA.  He is due for low-dose CT chest for lung cancer screening.  He is UTD on [id number] (11/2022), Shingrix (12/2018), Tdap (10/2019).  He is due for COVID, Wubhpjb21, Shingrix #2, flu.  COVID and high dose flu given.  Shingrix can be done at the pharmacy.  He is UTD on routine labs.

## 2023-12-14 NOTE — ASSESSMENT & PLAN NOTE
Blood pressure has been running at goal.  Continue metoprolol succinate 25 mg daily.  Refills were needed today.  Labs were not needed today.  Continue to monitor.

## 2023-12-14 NOTE — ASSESSMENT & PLAN NOTE
Stable on pantoprazole 40 twice daily.  Refills were not needed today.  Continue to monitor.  Wean PPI as able; we are going to try going back down to once daily.

## 2023-12-28 ENCOUNTER — HOSPITAL ENCOUNTER (OUTPATIENT)
Dept: CT IMAGING | Facility: HOSPITAL | Age: 68
Discharge: HOME | End: 2023-12-28
Admitting: FAMILY MEDICINE
Payer: MEDICARE

## 2023-12-28 DIAGNOSIS — Z87.891 PERSONAL HISTORY OF TOBACCO USE, PRESENTING HAZARDS TO HEALTH: ICD-10-CM

## 2023-12-28 PROCEDURE — 71271 CT THORAX LUNG CANCER SCR C-: CPT

## 2024-01-08 DIAGNOSIS — I10 HYPERTENSION, ESSENTIAL: ICD-10-CM

## 2024-01-08 DIAGNOSIS — F98.8 ATTENTION DEFICIT DISORDER (ADD) WITHOUT HYPERACTIVITY: Chronic | ICD-10-CM

## 2024-01-08 RX ORDER — METOPROLOL SUCCINATE 25 MG/1
25 TABLET, EXTENDED RELEASE ORAL DAILY
Qty: 90 TABLET | Refills: 1 | Status: CANCELLED | OUTPATIENT
Start: 2024-01-08

## 2024-01-08 NOTE — TELEPHONE ENCOUNTER
"Caller: Corey Velasquez \"Herrera\"    Relationship: Self    Best call back number [phone]     Requested Prescriptions:   Requested Prescriptions     Pending Prescriptions Disp Refills    amphetamine-dextroamphetamine XR (ADDERALL XR) 30 MG 24 hr capsule 30 capsule 0     Sig: Take 1 capsule by mouth Every Morning    metoprolol succinate XL (TOPROL-XL) 25 MG 24 hr tablet 90 tablet 1     Sig: Take 1 tablet by mouth Daily.        Pharmacy where request should be sent: [address] [phone] [facility] [phone] FX     Last office visit with prescribing clinician: 12/14/2023   Last telemedicine visit with prescribing clinician: Visit date not found   Next office visit with prescribing clinician: 3/22/2024     Additional details provided by patient: PATIENT IS NEEDING A REFILL    Does the patient have less than a 3 day supply:  [x] Yes  [] No    Would you like a call back once the refill request has been completed: [x] Yes [] No    If the office needs to give you a call back, can they leave a voicemail: [x] Yes [] No    Jeanmarie Mata Rep   01/08/24 11:11 EST         "

## 2024-01-09 RX ORDER — DEXTROAMPHETAMINE SACCHARATE, AMPHETAMINE ASPARTATE MONOHYDRATE, DEXTROAMPHETAMINE SULFATE AND AMPHETAMINE SULFATE 7.5; 7.5; 7.5; 7.5 MG/1; MG/1; MG/1; MG/1
30 CAPSULE, EXTENDED RELEASE ORAL EVERY MORNING
Qty: 30 CAPSULE | Refills: 0 | Status: SHIPPED | OUTPATIENT
Start: 2024-01-09

## 2024-02-07 DIAGNOSIS — F98.8 ATTENTION DEFICIT DISORDER (ADD) WITHOUT HYPERACTIVITY: Chronic | ICD-10-CM

## 2024-02-07 RX ORDER — DEXTROAMPHETAMINE SACCHARATE, AMPHETAMINE ASPARTATE MONOHYDRATE, DEXTROAMPHETAMINE SULFATE AND AMPHETAMINE SULFATE 7.5; 7.5; 7.5; 7.5 MG/1; MG/1; MG/1; MG/1
30 CAPSULE, EXTENDED RELEASE ORAL EVERY MORNING
Qty: 30 CAPSULE | Refills: 0 | Status: SHIPPED | OUTPATIENT
Start: 2024-02-07

## 2024-02-07 NOTE — TELEPHONE ENCOUNTER
"    Caller: Corey Velasquez \"Herrera\"    Relationship: Self    Best call back number: [phone]    Requested Prescriptions:   Requested Prescriptions     Pending Prescriptions Disp Refills    amphetamine-dextroamphetamine XR (ADDERALL XR) 30 MG 24 hr capsule 30 capsule 0     Sig: Take 1 capsule by mouth Every Morning        Pharm[facility] where request should be sent[facility] [address] [phone]  - [phone] FX     Last office visit with prescribing clinician: 12/14/2023   Last telemedicine visit with prescribing clinician: Visit date not found   Next office visit with prescribing clinician: 3/22/2024     Additional details provided by patient: PHARMACY IS CLOSED ON SATURDAY AND SUNDAY     Does the patient have less than a 3 day supply:  [] Yes  [x] No    Would you like a call back once the refill request has been completed: [] Yes [] No    If the office needs to give you a call back, can they leave a voicemail: [] Yes [] No    Jeanmarie Hernandez   02/07/24 10:03 EST         "

## 2024-03-11 ENCOUNTER — TELEPHONE (OUTPATIENT)
Dept: FAMILY MEDICINE CLINIC | Age: 69
End: 2024-03-11
Payer: MEDICARE

## 2024-03-11 DIAGNOSIS — F98.8 ATTENTION DEFICIT DISORDER (ADD) WITHOUT HYPERACTIVITY: Chronic | ICD-10-CM

## 2024-03-11 NOTE — TELEPHONE ENCOUNTER
"Caller: Corey Velasquez \"Herrera\"    Relationship: Self    Best call back number:     [phone]       Requested Prescriptions:   Requested Prescriptions     Pending Prescriptions Disp Refills    amphetamine-dextroamphetamine XR (ADDERALL XR) 30 MG 24 hr capsule 30 capsule 0     Sig: Take 1 capsule by mouth Every Morning        Pharmacy where request should be sent: [facility] [address] [phone]  - [phone] FX     Last office visit with prescribing clinician: 12/14/2023   Last telemedicine visit with prescribing clinician: Visit date not found   Next office visit with prescribing clinician: 3/22/2024     Additional details provided by patient:     Does the patient have less than a 3 day supply:  [x] Yes  [] No    Would you like a call back once the refill request has been completed: [] Yes [x] No    If the office needs to give you a call back, can they leave a voicemail: [] Yes [x] No    Jeanmarie Pierce Rep   03/11/24 10:26 EDT           "

## 2024-03-12 RX ORDER — DEXTROAMPHETAMINE SACCHARATE, AMPHETAMINE ASPARTATE MONOHYDRATE, DEXTROAMPHETAMINE SULFATE AND AMPHETAMINE SULFATE 7.5; 7.5; 7.5; 7.5 MG/1; MG/1; MG/1; MG/1
30 CAPSULE, EXTENDED RELEASE ORAL EVERY MORNING
Qty: 30 CAPSULE | Refills: 0 | Status: SHIPPED | OUTPATIENT
Start: 2024-03-12

## 2024-03-22 ENCOUNTER — LAB (OUTPATIENT)
Dept: LAB | Facility: HOSPITAL | Age: 69
End: 2024-03-22
Payer: MEDICARE

## 2024-03-22 ENCOUNTER — OFFICE VISIT (OUTPATIENT)
Dept: FAMILY MEDICINE CLINIC | Age: 69
End: 2024-03-22
Payer: MEDICARE

## 2024-03-22 VITALS
BODY MASS INDEX: 33.53 KG/M2 | WEIGHT: 213.6 LBS | DIASTOLIC BLOOD PRESSURE: 94 MMHG | SYSTOLIC BLOOD PRESSURE: 137 MMHG | HEART RATE: 94 BPM | TEMPERATURE: 98.5 F | HEIGHT: 67 IN

## 2024-03-22 DIAGNOSIS — Z12.11 SCREENING FOR COLON CANCER: ICD-10-CM

## 2024-03-22 DIAGNOSIS — K22.70 BARRETT'S ESOPHAGUS WITHOUT DYSPLASIA: ICD-10-CM

## 2024-03-22 DIAGNOSIS — Z79.899 HIGH RISK MEDICATION USE: ICD-10-CM

## 2024-03-22 DIAGNOSIS — I10 HYPERTENSION, ESSENTIAL: ICD-10-CM

## 2024-03-22 DIAGNOSIS — F33.0 MILD EPISODE OF RECURRENT MAJOR DEPRESSIVE DISORDER: ICD-10-CM

## 2024-03-22 DIAGNOSIS — H57.12 LEFT EYE PAIN: ICD-10-CM

## 2024-03-22 DIAGNOSIS — F98.8 ATTENTION DEFICIT DISORDER (ADD) WITHOUT HYPERACTIVITY: Primary | Chronic | ICD-10-CM

## 2024-03-22 DIAGNOSIS — K29.00 ACUTE SUPERFICIAL GASTRITIS WITHOUT HEMORRHAGE: ICD-10-CM

## 2024-03-22 PROBLEM — Z00.00 ANNUAL PHYSICAL EXAM: Status: RESOLVED | Noted: 2021-09-22 | Resolved: 2024-03-22

## 2024-03-22 LAB
ALBUMIN SERPL-MCNC: 4.2 G/DL (ref 3.5–5.2)
ALBUMIN/GLOB SERPL: 1.5 G/DL
ALP SERPL-CCNC: 274 U/L (ref 39–117)
ALT SERPL W P-5'-P-CCNC: 18 U/L (ref 1–41)
AMPHET+METHAMPHET UR QL: POSITIVE
AMPHETAMINES UR QL: NEGATIVE
ANION GAP SERPL CALCULATED.3IONS-SCNC: 9 MMOL/L (ref 5–15)
AST SERPL-CCNC: 18 U/L (ref 1–40)
BARBITURATES UR QL SCN: NEGATIVE
BASOPHILS # BLD AUTO: 0.03 10*3/MM3 (ref 0–0.2)
BASOPHILS NFR BLD AUTO: 0.5 % (ref 0–1.5)
BENZODIAZ UR QL SCN: NEGATIVE
BILIRUB SERPL-MCNC: 0.2 MG/DL (ref 0–1.2)
BUN SERPL-MCNC: 12 MG/DL (ref 8–23)
BUN/CREAT SERPL: 13.8 (ref 7–25)
BUPRENORPHINE SERPL-MCNC: NEGATIVE NG/ML
CALCIUM SPEC-SCNC: 9.1 MG/DL (ref 8.6–10.5)
CANNABINOIDS SERPL QL: NEGATIVE
CHLORIDE SERPL-SCNC: 104 MMOL/L (ref 98–107)
CHOLEST SERPL-MCNC: 152 MG/DL (ref 0–200)
CO2 SERPL-SCNC: 27 MMOL/L (ref 22–29)
COCAINE UR QL: NEGATIVE
CREAT SERPL-MCNC: 0.87 MG/DL (ref 0.76–1.27)
DEPRECATED RDW RBC AUTO: 42 FL (ref 37–54)
EGFRCR SERPLBLD CKD-EPI 2021: 94.6 ML/MIN/1.73
EOSINOPHIL # BLD AUTO: 0.17 10*3/MM3 (ref 0–0.4)
EOSINOPHIL NFR BLD AUTO: 2.8 % (ref 0.3–6.2)
ERYTHROCYTE [DISTWIDTH] IN BLOOD BY AUTOMATED COUNT: 11.8 % (ref 12.3–15.4)
EXPIRATION DATE: ABNORMAL
GLOBULIN UR ELPH-MCNC: 2.8 GM/DL
GLUCOSE SERPL-MCNC: 85 MG/DL (ref 65–99)
HCT VFR BLD AUTO: 37.3 % (ref 37.5–51)
HDLC SERPL-MCNC: 39 MG/DL (ref 40–60)
HGB BLD-MCNC: 12.5 G/DL (ref 13–17.7)
IMM GRANULOCYTES # BLD AUTO: 0 10*3/MM3 (ref 0–0.05)
IMM GRANULOCYTES NFR BLD AUTO: 0 % (ref 0–0.5)
LDLC SERPL CALC-MCNC: 86 MG/DL (ref 0–100)
LDLC/HDLC SERPL: 2.1 {RATIO}
LYMPHOCYTES # BLD AUTO: 1.64 10*3/MM3 (ref 0.7–3.1)
LYMPHOCYTES NFR BLD AUTO: 26.6 % (ref 19.6–45.3)
Lab: ABNORMAL
MAGNESIUM SERPL-MCNC: 1.7 MG/DL (ref 1.6–2.4)
MCH RBC QN AUTO: 32.1 PG (ref 26.6–33)
MCHC RBC AUTO-ENTMCNC: 33.5 G/DL (ref 31.5–35.7)
MCV RBC AUTO: 95.6 FL (ref 79–97)
MDMA UR QL SCN: NEGATIVE
METHADONE UR QL SCN: NEGATIVE
MONOCYTES # BLD AUTO: 0.59 10*3/MM3 (ref 0.1–0.9)
MONOCYTES NFR BLD AUTO: 9.6 % (ref 5–12)
MORPHINE/OPIATES SCREEN, URINE: NEGATIVE
NEUTROPHILS NFR BLD AUTO: 3.74 10*3/MM3 (ref 1.7–7)
NEUTROPHILS NFR BLD AUTO: 60.5 % (ref 42.7–76)
OXYCODONE UR QL SCN: NEGATIVE
PCP UR QL SCN: NEGATIVE
PLATELET # BLD AUTO: 300 10*3/MM3 (ref 140–450)
PMV BLD AUTO: 9.9 FL (ref 6–12)
POTASSIUM SERPL-SCNC: 4.2 MMOL/L (ref 3.5–5.2)
PROT SERPL-MCNC: 7 G/DL (ref 6–8.5)
RBC # BLD AUTO: 3.9 10*6/MM3 (ref 4.14–5.8)
SODIUM SERPL-SCNC: 140 MMOL/L (ref 136–145)
TRIGL SERPL-MCNC: 156 MG/DL (ref 0–150)
VLDLC SERPL-MCNC: 27 MG/DL (ref 5–40)
WBC NRBC COR # BLD AUTO: 6.17 10*3/MM3 (ref 3.4–10.8)

## 2024-03-22 PROCEDURE — 85025 COMPLETE CBC W/AUTO DIFF WBC: CPT

## 2024-03-22 PROCEDURE — 36415 COLL VENOUS BLD VENIPUNCTURE: CPT

## 2024-03-22 PROCEDURE — 83735 ASSAY OF MAGNESIUM: CPT

## 2024-03-22 PROCEDURE — 80061 LIPID PANEL: CPT

## 2024-03-22 PROCEDURE — 80053 COMPREHEN METABOLIC PANEL: CPT

## 2024-03-22 RX ORDER — ERYTHROMYCIN 5 MG/G
OINTMENT OPHTHALMIC NIGHTLY
Qty: 1 G | Refills: 0 | Status: SHIPPED | OUTPATIENT
Start: 2024-03-22

## 2024-03-22 NOTE — ASSESSMENT & PLAN NOTE
Stable on pantoprazole 40 mg daily.  (Wants to keep rx at BID because he occasionally needs it twice daily for flares.)  Refills were needed today.  Continue to monitor.  Wean PPI as able.

## 2024-03-22 NOTE — ASSESSMENT & PLAN NOTE
Blood pressure has been running at goal.  Continue metoprolol succinate 25 mg daily.  Refills were not needed today.  Labs were needed today.  Continue to monitor.

## 2024-04-03 DIAGNOSIS — F98.8 ATTENTION DEFICIT DISORDER (ADD) WITHOUT HYPERACTIVITY: Chronic | ICD-10-CM

## 2024-04-03 NOTE — TELEPHONE ENCOUNTER
"    Caller: Corey Velasquez \"Herrera\"    Relationship: Self    Best call back number: [phone]    Requested Prescriptions:   Requested Prescriptions     Pending Prescriptions Disp Refills    amphetamine-dextroamphetamine XR (ADDERALL XR) 30 MG 24 hr capsule 30 capsule 0     Sig: Take 1 capsule by mouth Every Morning        Pharmacy where request should be sent: [facility] [address] [phone]  - [phone] FX     Last office visit with prescribing clinician: 3/22/2024   Last telemedicine visit with prescribing clinician: Visit date not found   Next office visit with prescribing clinician: 6/26/2024     Additional details provided by patient:     Does the patient have less than a 3 day supply:  [x] Yes  [] No    Would you like a call back once the refill request has been completed: [] Yes [x] No    If the office needs to give you a call back, can they leave a voicemail: [] Yes [x] No    Jeanmarie Griffith   04/03/24 12:51 EDT         "

## 2024-04-04 RX ORDER — DEXTROAMPHETAMINE SACCHARATE, AMPHETAMINE ASPARTATE MONOHYDRATE, DEXTROAMPHETAMINE SULFATE AND AMPHETAMINE SULFATE 7.5; 7.5; 7.5; 7.5 MG/1; MG/1; MG/1; MG/1
30 CAPSULE, EXTENDED RELEASE ORAL EVERY MORNING
Qty: 30 CAPSULE | Refills: 0 | Status: SHIPPED | OUTPATIENT
Start: 2024-04-04

## 2024-04-04 NOTE — TELEPHONE ENCOUNTER
LF 3/12/24 #0    LOV 3/22/24    POC Medline 12 Panel Urine Drug Screen (03/22/2024 08:50)      On call

## 2024-05-06 DIAGNOSIS — F98.8 ATTENTION DEFICIT DISORDER (ADD) WITHOUT HYPERACTIVITY: Chronic | ICD-10-CM

## 2024-05-07 RX ORDER — DEXTROAMPHETAMINE SACCHARATE, AMPHETAMINE ASPARTATE MONOHYDRATE, DEXTROAMPHETAMINE SULFATE AND AMPHETAMINE SULFATE 7.5; 7.5; 7.5; 7.5 MG/1; MG/1; MG/1; MG/1
30 CAPSULE, EXTENDED RELEASE ORAL EVERY MORNING
Qty: 30 CAPSULE | Refills: 0 | Status: SHIPPED | OUTPATIENT
Start: 2024-05-07

## 2024-05-09 ENCOUNTER — TELEPHONE (OUTPATIENT)
Dept: FAMILY MEDICINE CLINIC | Age: 69
End: 2024-05-09
Payer: MEDICARE

## 2024-06-10 DIAGNOSIS — F98.8 ATTENTION DEFICIT DISORDER (ADD) WITHOUT HYPERACTIVITY: Chronic | ICD-10-CM

## 2024-06-10 NOTE — TELEPHONE ENCOUNTER
"    Caller: Corey Velasquez \"Herrera\"    Relationship: Self    Best call back number: [phone]    Requested Prescriptions:   Requested Prescriptions     Pending Prescriptions Disp Refills    amphetamine-dextroamphetamine XR (ADDERALL XR) 30 MG 24 hr capsule 30 capsule 0     Sig: Take 1 capsule by mouth Every Morning        Pharmacy where request should be sent: [facility] [phone]  - [phone] FX     Last office visit with prescribing clinician: 3/22/2024   Last telemedicine visit with prescribing clinician: Visit date not found   Next office visit with prescribing clinician: 6/26/2024     Additional details provided by patient:     Does the patient have less than a 3 day supply:  [x] Yes  [] No    Would you like a call back once the refill request has been completed: [] Yes [] No    If the office needs to give you a call back, can they leave a voicemail: [] Yes [] No    Jeanmarie Hernandez   06/10/24 09:43 EDT         "

## 2024-06-11 DIAGNOSIS — K29.00 ACUTE SUPERFICIAL GASTRITIS WITHOUT HEMORRHAGE: ICD-10-CM

## 2024-06-11 DIAGNOSIS — K22.70 BARRETT'S ESOPHAGUS WITHOUT DYSPLASIA: ICD-10-CM

## 2024-06-11 RX ORDER — DEXTROAMPHETAMINE SACCHARATE, AMPHETAMINE ASPARTATE MONOHYDRATE, DEXTROAMPHETAMINE SULFATE AND AMPHETAMINE SULFATE 7.5; 7.5; 7.5; 7.5 MG/1; MG/1; MG/1; MG/1
30 CAPSULE, EXTENDED RELEASE ORAL EVERY MORNING
Qty: 30 CAPSULE | Refills: 0 | Status: SHIPPED | OUTPATIENT
Start: 2024-06-11

## 2024-06-11 RX ORDER — PANTOPRAZOLE SODIUM 40 MG/1
TABLET, DELAYED RELEASE ORAL
Qty: 60 TABLET | Refills: 5 | Status: SHIPPED | OUTPATIENT
Start: 2024-06-11

## 2024-06-26 ENCOUNTER — OFFICE VISIT (OUTPATIENT)
Dept: FAMILY MEDICINE CLINIC | Age: 69
End: 2024-06-26
Payer: MEDICARE

## 2024-06-26 ENCOUNTER — HOSPITAL ENCOUNTER (OUTPATIENT)
Dept: GENERAL RADIOLOGY | Facility: HOSPITAL | Age: 69
Discharge: HOME | End: 2024-06-26
Admitting: FAMILY MEDICINE
Payer: MEDICARE

## 2024-06-26 VITALS
HEIGHT: 67 IN | SYSTOLIC BLOOD PRESSURE: 146 MMHG | OXYGEN SATURATION: 98 % | HEART RATE: 82 BPM | DIASTOLIC BLOOD PRESSURE: 81 MMHG | BODY MASS INDEX: 32.96 KG/M2 | TEMPERATURE: 98.5 F | WEIGHT: 210 LBS

## 2024-06-26 DIAGNOSIS — M25.511 CHRONIC RIGHT SHOULDER PAIN: ICD-10-CM

## 2024-06-26 DIAGNOSIS — G89.29 CHRONIC LEFT-SIDED LOW BACK PAIN WITH LEFT-SIDED SCIATICA: ICD-10-CM

## 2024-06-26 DIAGNOSIS — K29.00 ACUTE SUPERFICIAL GASTRITIS WITHOUT HEMORRHAGE: ICD-10-CM

## 2024-06-26 DIAGNOSIS — M54.42 CHRONIC LEFT-SIDED LOW BACK PAIN WITH LEFT-SIDED SCIATICA: ICD-10-CM

## 2024-06-26 DIAGNOSIS — F33.0 MILD EPISODE OF RECURRENT MAJOR DEPRESSIVE DISORDER: ICD-10-CM

## 2024-06-26 DIAGNOSIS — Z12.11 SCREENING FOR COLON CANCER: ICD-10-CM

## 2024-06-26 DIAGNOSIS — G89.29 CHRONIC RIGHT SHOULDER PAIN: ICD-10-CM

## 2024-06-26 DIAGNOSIS — I10 HYPERTENSION, ESSENTIAL: ICD-10-CM

## 2024-06-26 DIAGNOSIS — Z79.899 HIGH RISK MEDICATION USE: ICD-10-CM

## 2024-06-26 DIAGNOSIS — E66.09 CLASS 1 OBESITY DUE TO EXCESS CALORIES WITH SERIOUS COMORBIDITY AND BODY MASS INDEX (BMI) OF 32.0 TO 32.9 IN ADULT: ICD-10-CM

## 2024-06-26 DIAGNOSIS — F98.8 ATTENTION DEFICIT DISORDER (ADD) WITHOUT HYPERACTIVITY: Primary | Chronic | ICD-10-CM

## 2024-06-26 PROCEDURE — 73030 X-RAY EXAM OF SHOULDER: CPT

## 2024-06-26 PROCEDURE — G2211 COMPLEX E/M VISIT ADD ON: HCPCS | Performed by: FAMILY MEDICINE

## 2024-06-26 PROCEDURE — 1159F MED LIST DOCD IN RCRD: CPT | Performed by: FAMILY MEDICINE

## 2024-06-26 PROCEDURE — 3079F DIAST BP 80-89 MM HG: CPT | Performed by: FAMILY MEDICINE

## 2024-06-26 PROCEDURE — 3077F SYST BP >= 140 MM HG: CPT | Performed by: FAMILY MEDICINE

## 2024-06-26 PROCEDURE — 99214 OFFICE O/P EST MOD 30 MIN: CPT | Performed by: FAMILY MEDICINE

## 2024-06-26 PROCEDURE — 1160F RVW MEDS BY RX/DR IN RCRD: CPT | Performed by: FAMILY MEDICINE

## 2024-06-26 RX ORDER — DULOXETIN HYDROCHLORIDE 30 MG/1
30 CAPSULE, DELAYED RELEASE ORAL DAILY
Qty: 30 CAPSULE | Refills: 5 | Status: SHIPPED | OUTPATIENT
Start: 2024-06-26

## 2024-06-26 RX ORDER — METOPROLOL SUCCINATE 25 MG/1
25 TABLET, EXTENDED RELEASE ORAL DAILY
Qty: 90 TABLET | Refills: 1 | Status: SHIPPED | OUTPATIENT
Start: 2024-06-26

## 2024-06-26 RX ORDER — FLUOXETINE 10 MG/1
CAPSULE ORAL
Qty: 42 CAPSULE | Refills: 0 | Status: SHIPPED | OUTPATIENT
Start: 2024-06-26 | End: 2024-07-24

## 2024-06-26 NOTE — ASSESSMENT & PLAN NOTE
Stable on pantoprazole 40 mg daily.  Refills were not needed today.  Continue to monitor.   He does need a repeat EGD; referral placed as below.  Wean PPI as able.

## 2024-06-26 NOTE — PROGRESS NOTES
"Chief Complaint  ADD (3 month follow up )    Subjective          Corey Velasquez presents to [facility] today for routine follow-up.  He is accompanied today by his wife Zenobia.     He is on Adderall XR for ADD.  Stable for many years.  Concentration and focus have been well controlled.  Denies adverse effects.  He was originally diagnosed by Dr. Zeng Psychiatry.  He does not take drug holidays.       He is on tizanidine, magnesium gluconate and Tylenol for chronic low back pain with L sciatica.  He has used gabapentin in the past (made him feel \"mean\").  Follows with Dr. Ceballos ([facility], Spinal Surgery) and Dr. Retana ([facility], Pain Management).  He has completed PT in the past and does his home exercise program regularly.  S/p epidurals with good relief as well as RFA a couple of months ago.  RFA finally started giving him a lot of relief after about 3 weeks and that relief has persisted since then.    He is considering R TKA in the fall with Dr. Rothman.      His right shoulder has been bothering him for the past 1 year but it has been worsening.  Pain starts in the shoulder blade and radiates up and over in to the R bicep.  S/p R biceps tendon rupture.       He is on metoprolol succinate for hypertension.  BP has been well controlled at home.  Denies chest pain, palpitations or shortness of breath.  He follows with Cardiology.      He is on pantoprazole with prn Zofran for gastritis and Clark's esophagus.  He has successfully weaned himself down to once daily on the PPI for the most part, but would like his rx to remain for BID in case he has a flare.  EGD done by Dr. Feldman showed strictures and severe gastritis as well as Clark's esophagus with repeat scope showing some improvement.  He is due for repeat colonoscopy, last done 2/2018.  Five year repeat recommended.      He is on fluoxetine for depression.  Mood has been \"I'm fine.\"  However, Zenobia states that he has been " "more irritable.  . He has been on the fluoxetine for 46 years.        Current Outpatient Medications:     amphetamine-dextroamphetamine XR (ADDERALL XR) 30 MG 24 hr capsule, Take 1 capsule by mouth Every Morning, Disp: 30 capsule, Rfl: 0    FLUoxetine (PROzac) 10 MG capsule, Take 2 capsules by mouth Daily for 14 days, THEN 1 capsule Daily for 14 days., Disp: 42 capsule, Rfl: 0    magnesium gluconate (MAGONATE) 500 MG tablet, Take 1 tablet by mouth 2 (Two) Times a Day., Disp: , Rfl:     metoprolol succinate XL (TOPROL-XL) 25 MG 24 hr tablet, Take 1 tablet by mouth Daily., Disp: 90 tablet, Rfl: 1    pantoprazole (PROTONIX) 40 MG EC tablet, TAKE ONE (1) TABLET BY MOUTH TWO (2) TIMES A DAY., Disp: 60 tablet, Rfl: 5    tiZANidine (ZANAFLEX) 2 MG tablet, Take 1 tablet by mouth 2 (Two) Times a Day As Needed for Muscle Spasms., Disp: 20 tablet, Rfl: 0    DULoxetine (CYMBALTA) 30 MG capsule, Take 1 capsule by mouth Daily., Disp: 30 capsule, Rfl: 5  Medications Discontinued During This Encounter   Medication Reason    erythromycin (ROMYCIN) 5 MG/GM ophthalmic ointment Historical Med - Therapy completed    FLUoxetine (PROzac) 40 MG capsule Reorder    metoprolol succinate XL (TOPROL-XL) 25 MG 24 hr tablet Reorder         Allergies:  Patient has no known allergies.      Objective   Vital Signs:   Vitals:    06/26/24 0807   BP: 146/81   BP Location: Left arm   Patient Position: Sitting   Cuff Size: Large Adult   Pulse: 82   Temp: 98.5 °F (36.9 °C)   TempSrc: Oral   SpO2: 98%   Weight: 95.3 kg (210 lb)   Height: 170.2 cm (67.01\")       Body mass index is 32.88 kg/m².           Physical Exam  Vitals reviewed.   Constitutional:       General: He is not in acute distress.     Appearance: Normal appearance. He is well-developed.   HENT:      Head: Normocephalic and atraumatic.      Right Ear: Tympanic membrane, ear canal and external ear normal.      Left Ear: Tympanic membrane, ear canal and external ear normal.   Eyes:      " Extraocular Movements: Extraocular movements intact.      Conjunctiva/sclera: Conjunctivae normal.      Pupils: Pupils are equal, round, and reactive to light.   Cardiovascular:      Rate and Rhythm: Normal rate and regular rhythm.      Pulses: Normal pulses.      Heart sounds: No murmur heard.  Pulmonary:      Effort: Pulmonary effort is normal.      Breath sounds: Normal breath sounds. No wheezing, rhonchi or rales.   Abdominal:      General: Bowel sounds are normal. There is no distension.      Palpations: Abdomen is soft.      Tenderness: There is no abdominal tenderness.   Musculoskeletal:         General: Normal range of motion.   Neurological:      Mental Status: He is alert.   Psychiatric:         Mood and Affect: Affect normal.         Lab Results   Component Value Date    GLUCOSE 85 03/22/2024    BUN 12 03/22/2024    CREATININE 0.87 03/22/2024    EGFRIFNONA 99 11/23/2021    BCR 13.8 03/22/2024    K 4.2 03/22/2024    CO2 27.0 03/22/2024    CALCIUM 9.1 03/22/2024    ALBUMIN 4.2 03/22/2024    LABIL2 1.2 (L) 08/10/2020    AST 18 03/22/2024    ALT 18 03/22/2024       Lab Results   Component Value Date    CHOL 152 03/22/2024    CHLPL 173 08/10/2020    TRIG 156 (H) 03/22/2024    HDL 39 (L) 03/22/2024    LDL 86 03/22/2024       Lab Results   Component Value Date    WBC 6.17 03/22/2024    HGB 12.5 (L) 03/22/2024    HCT 37.3 (L) 03/22/2024    MCV 95.6 03/22/2024     03/22/2024            Assessment and Plan    Assessment & Plan  Attention deficit disorder (ADD) without hyperactivity  Stable on current regimen.  Symptoms are well controlled.  No adverse effects. He does require ongoing use of this controlled substance to function.  Tox screen was due today.  Prior tox screen appropriate.  ANGEL was run today.  Refills were not needed today.  RTC 3 months.   High risk medication use    Hypertension, essential  Blood pressure has been running at goal.  Continue metoprolol succinate 25 mg daily.  Refills were  needed today.  Labs were not needed today.  Continue to monitor.   Mild episode of recurrent major depressive disorder    We are going to switch him from fluoxetine to duloxetine to get better results and hopefully get some control of his pain too.  Cross-taper with fluoxetine 20 mg daily x 2 weeks, then 10 mg daily x 2 weeks, then stop.  At the same time, start duloxetine 30 mg daily.  Refills were needed today.  Continue to monitor.   Chronic left-sided low back pain with left-sided sciatica  Following with Dr. Traci Retana at [facility].       Acute superficial gastritis without hemorrhage  Stable on pantoprazole 40 mg daily.  Refills were not needed today.  Continue to monitor.   He does need a repeat EGD; referral placed as below.  Wean PPI as able.   Chronic right shoulder pain  Checking R shoulder XR.  Screening for colon cancer  Due for screening colonoscopy.    Class 1 obesity due to excess calories with serious comorbidity and body mass index (BMI) of 32.0 to 32.9 in adult  Patient's (Body mass index is 32.88 kg/m².) indicates that they are obese (BMI >30) with health conditions that include hypertension . Weight is worsening. BMI  is above average; BMI management plan is completed. We discussed portion control and increasing exercise.     Orders Placed This Encounter   Procedures    XR Shoulder 2+ View Right    Ambulatory Referral For Screening Colonoscopy    Ambulatory Referral to General Surgery    POC Medline 12 Panel Urine Drug Screen       New Medications Ordered This Visit   Medications    FLUoxetine (PROzac) 10 MG capsule     Sig: Take 2 capsules by mouth Daily for 14 days, THEN 1 capsule Daily for 14 days.     Dispense:  42 capsule     Refill:  0    DULoxetine (CYMBALTA) 30 MG capsule     Sig: Take 1 capsule by mouth Daily.     Dispense:  30 capsule     Refill:  5    metoprolol succinate XL (TOPROL-XL) 25 MG 24 hr tablet     Sig: Take 1 tablet by mouth Daily.     Dispense:  90 tablet      Refill:  1             Follow Up   Return in about 3 months (around 9/26/2024).  Patient was given instructions and counseling regarding his condition or for health maintenance advice. Please see specific information pulled into the AVS if appropriate.           03/22/2024

## 2024-06-26 NOTE — ASSESSMENT & PLAN NOTE
We are going to switch him from fluoxetine to duloxetine to get better results and hopefully get some control of his pain too.  Cross-taper with fluoxetine 20 mg daily x 2 weeks, then 10 mg daily x 2 weeks, then stop.  At the same time, start duloxetine 30 mg daily.  Refills were needed today.  Continue to monitor.

## 2024-07-03 DIAGNOSIS — M25.511 CHRONIC RIGHT SHOULDER PAIN: Primary | ICD-10-CM

## 2024-07-03 DIAGNOSIS — G89.29 CHRONIC RIGHT SHOULDER PAIN: Primary | ICD-10-CM

## 2024-07-09 DIAGNOSIS — F98.8 ATTENTION DEFICIT DISORDER (ADD) WITHOUT HYPERACTIVITY: Chronic | ICD-10-CM

## 2024-07-09 RX ORDER — DEXTROAMPHETAMINE SACCHARATE, AMPHETAMINE ASPARTATE MONOHYDRATE, DEXTROAMPHETAMINE SULFATE AND AMPHETAMINE SULFATE 7.5; 7.5; 7.5; 7.5 MG/1; MG/1; MG/1; MG/1
30 CAPSULE, EXTENDED RELEASE ORAL EVERY MORNING
Qty: 30 CAPSULE | Refills: 0 | Status: SHIPPED | OUTPATIENT
Start: 2024-07-09

## 2024-07-09 NOTE — TELEPHONE ENCOUNTER
"    Caller: Corey Velasquez \"Herrera\"    Relationship: Self    Best call back number: [phone]    Requested Prescriptions:   Requested Prescriptions     Pending Prescriptions Disp Refills    amphetamine-dextroamphetamine XR (ADDERALL XR) 30 MG 24 hr capsule 30 capsule 0     Sig: Take 1 capsule by mouth Every Morning        Pharmacy where request should be sent: [facility] [phone]  - [phone] FX     Last office visit with prescribing clinician: 6/26/2024   Last telemedicine visit with prescribing clinician: Visit date not found   Next office visit with prescribing clinician: 10/4/2024     Additional details provided by patient: PATIENT HAS LESS THAN A THREE DAY SUPPLY OF MEDICATION. PLEASE SEND NEW PRESCRIPTION TO PHARMACY ASAP.    Does the patient have less than a 3 day supply:  [x] Yes  [] No    Would you like a call back once the refill request has been completed: [] Yes [] No    If the office needs to give you a call back, can they leave a voicemail: [] Yes [] No    Jeanmarie Quintero   07/09/24 10:45 EDT           "

## 2024-08-12 DIAGNOSIS — F98.8 ATTENTION DEFICIT DISORDER (ADD) WITHOUT HYPERACTIVITY: Chronic | ICD-10-CM

## 2024-08-12 NOTE — TELEPHONE ENCOUNTER
"  Caller: Corey Velasquez \"Herrera\"    Relationship: Self    Best call back number: [phone]    Requested Prescriptions:   Requested Prescriptions     Pending Prescriptions Disp Refills    amphetamine-dextroamphetamine XR (ADDERALL XR) 30 MG 24 hr capsule 30 capsule 0     Sig: Take 1 capsule by mouth Every Morning        Pharmacy where request should be sent: [facility] [address] [phone] [facility] [phone] FX     Last office visit with prescribing clinician: 6/26/2024   Last telemedicine visit with prescribing clinician: Visit date not found   Next office visit with prescribing clinician: 10/4/2024     Does the patient have less than a 3 day supply:  [x] Yes  [] No    Would you like a call back once the refill request has been completed: [] Yes [] No    If the office needs to give you a call back, can they leave a voicemail: [] Yes [] No    Jeanmarie Patino   08/12/24 12:42 EDT         "

## 2024-08-13 RX ORDER — DEXTROAMPHETAMINE SACCHARATE, AMPHETAMINE ASPARTATE MONOHYDRATE, DEXTROAMPHETAMINE SULFATE AND AMPHETAMINE SULFATE 7.5; 7.5; 7.5; 7.5 MG/1; MG/1; MG/1; MG/1
30 CAPSULE, EXTENDED RELEASE ORAL EVERY MORNING
Qty: 30 CAPSULE | Refills: 0 | Status: SHIPPED | OUTPATIENT
Start: 2024-08-13

## 2024-08-28 ENCOUNTER — TELEPHONE (OUTPATIENT)
Dept: FAMILY MEDICINE CLINIC | Age: 69
End: 2024-08-28
Payer: MEDICARE

## 2024-09-11 DIAGNOSIS — F98.8 ATTENTION DEFICIT DISORDER (ADD) WITHOUT HYPERACTIVITY: Chronic | ICD-10-CM

## 2024-09-11 RX ORDER — DEXTROAMPHETAMINE SACCHARATE, AMPHETAMINE ASPARTATE MONOHYDRATE, DEXTROAMPHETAMINE SULFATE AND AMPHETAMINE SULFATE 7.5; 7.5; 7.5; 7.5 MG/1; MG/1; MG/1; MG/1
30 CAPSULE, EXTENDED RELEASE ORAL EVERY MORNING
Qty: 30 CAPSULE | Refills: 0 | Status: SHIPPED | OUTPATIENT
Start: 2024-09-11

## 2024-09-11 NOTE — TELEPHONE ENCOUNTER
LV:6/26/24  LF:8/13/24 #30  Tox:6/26/24   Olanzapine Counseling- I discussed with the patient the common side effects of olanzapine including but are not limited to: lack of energy, dry mouth, increased appetite, sleepiness, tremor, constipation, dizziness, changes in behavior, or restlessness.  Explained that teenagers are more likely to experience headaches, abdominal pain, pain in the arms or legs, tiredness, and sleepiness.  Serious side effects include but are not limited: increased risk of death in elderly patients who are confused, have memory loss, or dementia-related psychosis; hyperglycemia; increased cholesterol and triglycerides; and weight gain.

## 2024-09-11 NOTE — TELEPHONE ENCOUNTER
"    Caller: Corey Velasquez \"Herrera\"    Relationship: Self    Best call back number: [phone]    Requested Prescriptions:   Requested Prescriptions     Pending Prescriptions Disp Refills    amphetamine-dextroamphetamine XR (ADDERALL XR) 30 MG 24 hr capsule 30 capsule 0     Sig: Take 1 capsule by mouth Every Morning        Pharmacy where request should be sent:    [facility] [address] [phone] [facility] [phone]  [phone]   Last office visit with prescribing clinician: 6/26/2024   Last telemedicine visit with prescribing clinician: Visit date not found   Next office visit with prescribing clinician: 10/4/2024     Additional details provided by patient: PATIENT HAS A ONE DAY SUPPLY    Does the patient have less than a 3 day supply:  [x] Yes  [] No    Would you like a call back once the refill request has been completed: [] Yes [] No    If the office needs to give you a call back, can they leave a voicemail: [] Yes [] No    Jeanmarie Tran   09/11/24 11:17 EDT           "

## 2024-09-16 ENCOUNTER — TELEPHONE (OUTPATIENT)
Dept: FAMILY MEDICINE CLINIC | Age: 69
End: 2024-09-16
Payer: MEDICARE

## 2024-09-16 DIAGNOSIS — F98.8 ATTENTION DEFICIT DISORDER (ADD) WITHOUT HYPERACTIVITY: Chronic | ICD-10-CM

## 2024-09-18 RX ORDER — DEXTROAMPHETAMINE SACCHARATE, AMPHETAMINE ASPARTATE, DEXTROAMPHETAMINE SULFATE AND AMPHETAMINE SULFATE 2.5; 2.5; 2.5; 2.5 MG/1; MG/1; MG/1; MG/1
30 TABLET ORAL DAILY
Qty: 90 TABLET | Refills: 0 | Status: SHIPPED | OUTPATIENT
Start: 2024-09-18

## 2024-09-18 RX ORDER — DEXTROAMPHETAMINE SACCHARATE, AMPHETAMINE ASPARTATE MONOHYDRATE, DEXTROAMPHETAMINE SULFATE AND AMPHETAMINE SULFATE 2.5; 2.5; 2.5; 2.5 MG/1; MG/1; MG/1; MG/1
30 CAPSULE, EXTENDED RELEASE ORAL EVERY MORNING
Qty: 90 CAPSULE | Refills: 0 | Status: SHIPPED | OUTPATIENT
Start: 2024-09-18 | End: 2024-09-18 | Stop reason: RX

## 2024-09-24 ENCOUNTER — CLINICAL SUPPORT (OUTPATIENT)
Dept: FAMILY MEDICINE CLINIC | Age: 69
End: 2024-09-24
Payer: MEDICARE

## 2024-09-24 ENCOUNTER — TRANSCRIBE ORDERS (OUTPATIENT)
Dept: LAB | Facility: HOSPITAL | Age: 69
End: 2024-09-24
Payer: MEDICARE

## 2024-09-24 ENCOUNTER — TRANSCRIBE ORDERS (OUTPATIENT)
Dept: FAMILY MEDICINE CLINIC | Age: 69
End: 2024-09-24
Payer: MEDICARE

## 2024-09-24 ENCOUNTER — LAB (OUTPATIENT)
Dept: LAB | Facility: HOSPITAL | Age: 69
End: 2024-09-24
Payer: MEDICARE

## 2024-09-24 DIAGNOSIS — Z01.818 PRE-OP TESTING: ICD-10-CM

## 2024-09-24 DIAGNOSIS — Z01.818 PREOPERATIVE TESTING: Primary | ICD-10-CM

## 2024-09-24 DIAGNOSIS — Z01.818 PRE-OP TESTING: Primary | ICD-10-CM

## 2024-09-24 LAB
ANION GAP SERPL CALCULATED.3IONS-SCNC: 8.1 MMOL/L (ref 5–15)
BASOPHILS # BLD AUTO: 0.03 10*3/MM3 (ref 0–0.2)
BASOPHILS NFR BLD AUTO: 0.5 % (ref 0–1.5)
BUN SERPL-MCNC: 7 MG/DL (ref 8–23)
BUN/CREAT SERPL: 8.9 (ref 7–25)
CALCIUM SPEC-SCNC: 9.1 MG/DL (ref 8.6–10.5)
CHLORIDE SERPL-SCNC: 104 MMOL/L (ref 98–107)
CO2 SERPL-SCNC: 27.9 MMOL/L (ref 22–29)
CREAT SERPL-MCNC: 0.79 MG/DL (ref 0.76–1.27)
DEPRECATED RDW RBC AUTO: 41.9 FL (ref 37–54)
EGFRCR SERPLBLD CKD-EPI 2021: 97.4 ML/MIN/1.73
EOSINOPHIL # BLD AUTO: 0.23 10*3/MM3 (ref 0–0.4)
EOSINOPHIL NFR BLD AUTO: 3.8 % (ref 0.3–6.2)
ERYTHROCYTE [DISTWIDTH] IN BLOOD BY AUTOMATED COUNT: 11.7 % (ref 12.3–15.4)
GLUCOSE SERPL-MCNC: 104 MG/DL (ref 65–99)
HBA1C MFR BLD: 5.7 % (ref 4.8–5.6)
HCT VFR BLD AUTO: 39.7 % (ref 37.5–51)
HGB BLD-MCNC: 13.1 G/DL (ref 13–17.7)
IMM GRANULOCYTES # BLD AUTO: 0 10*3/MM3 (ref 0–0.05)
IMM GRANULOCYTES NFR BLD AUTO: 0 % (ref 0–0.5)
LYMPHOCYTES # BLD AUTO: 2.07 10*3/MM3 (ref 0.7–3.1)
LYMPHOCYTES NFR BLD AUTO: 34 % (ref 19.6–45.3)
MCH RBC QN AUTO: 31.6 PG (ref 26.6–33)
MCHC RBC AUTO-ENTMCNC: 33 G/DL (ref 31.5–35.7)
MCV RBC AUTO: 95.7 FL (ref 79–97)
MONOCYTES # BLD AUTO: 0.56 10*3/MM3 (ref 0.1–0.9)
MONOCYTES NFR BLD AUTO: 9.2 % (ref 5–12)
NEUTROPHILS NFR BLD AUTO: 3.2 10*3/MM3 (ref 1.7–7)
NEUTROPHILS NFR BLD AUTO: 52.5 % (ref 42.7–76)
PLATELET # BLD AUTO: 285 10*3/MM3 (ref 140–450)
PMV BLD AUTO: 9.7 FL (ref 6–12)
POTASSIUM SERPL-SCNC: 4.4 MMOL/L (ref 3.5–5.2)
RBC # BLD AUTO: 4.15 10*6/MM3 (ref 4.14–5.8)
SODIUM SERPL-SCNC: 140 MMOL/L (ref 136–145)
WBC NRBC COR # BLD AUTO: 6.09 10*3/MM3 (ref 3.4–10.8)

## 2024-09-24 PROCEDURE — 36415 COLL VENOUS BLD VENIPUNCTURE: CPT

## 2024-09-24 PROCEDURE — 83036 HEMOGLOBIN GLYCOSYLATED A1C: CPT

## 2024-09-24 PROCEDURE — 85025 COMPLETE CBC W/AUTO DIFF WBC: CPT

## 2024-09-24 PROCEDURE — 80048 BASIC METABOLIC PNL TOTAL CA: CPT

## 2024-09-24 PROCEDURE — 93000 ELECTROCARDIOGRAM COMPLETE: CPT | Performed by: FAMILY MEDICINE

## 2024-10-04 ENCOUNTER — OFFICE VISIT (OUTPATIENT)
Dept: FAMILY MEDICINE CLINIC | Age: 69
End: 2024-10-04
Payer: MEDICARE

## 2024-10-04 VITALS
TEMPERATURE: 98.4 F | HEIGHT: 67 IN | OXYGEN SATURATION: 94 % | DIASTOLIC BLOOD PRESSURE: 100 MMHG | WEIGHT: 216 LBS | BODY MASS INDEX: 33.9 KG/M2 | HEART RATE: 98 BPM | SYSTOLIC BLOOD PRESSURE: 158 MMHG

## 2024-10-04 DIAGNOSIS — K22.70 BARRETT'S ESOPHAGUS WITHOUT DYSPLASIA: ICD-10-CM

## 2024-10-04 DIAGNOSIS — G89.29 CHRONIC LEFT-SIDED LOW BACK PAIN WITH LEFT-SIDED SCIATICA: ICD-10-CM

## 2024-10-04 DIAGNOSIS — I10 HYPERTENSION, ESSENTIAL: ICD-10-CM

## 2024-10-04 DIAGNOSIS — E66.09 CLASS 1 OBESITY DUE TO EXCESS CALORIES WITH SERIOUS COMORBIDITY AND BODY MASS INDEX (BMI) OF 33.0 TO 33.9 IN ADULT: ICD-10-CM

## 2024-10-04 DIAGNOSIS — E66.811 CLASS 1 OBESITY DUE TO EXCESS CALORIES WITH SERIOUS COMORBIDITY AND BODY MASS INDEX (BMI) OF 33.0 TO 33.9 IN ADULT: ICD-10-CM

## 2024-10-04 DIAGNOSIS — Z23 ENCOUNTER FOR IMMUNIZATION: ICD-10-CM

## 2024-10-04 DIAGNOSIS — F98.8 ATTENTION DEFICIT DISORDER (ADD) WITHOUT HYPERACTIVITY: Primary | Chronic | ICD-10-CM

## 2024-10-04 DIAGNOSIS — Z79.899 HIGH RISK MEDICATION USE: ICD-10-CM

## 2024-10-04 DIAGNOSIS — M54.42 CHRONIC LEFT-SIDED LOW BACK PAIN WITH LEFT-SIDED SCIATICA: ICD-10-CM

## 2024-10-04 DIAGNOSIS — F33.0 MILD EPISODE OF RECURRENT MAJOR DEPRESSIVE DISORDER: ICD-10-CM

## 2024-10-04 DIAGNOSIS — K29.00 ACUTE SUPERFICIAL GASTRITIS WITHOUT HEMORRHAGE: ICD-10-CM

## 2024-10-04 RX ORDER — GABAPENTIN 300 MG/1
300 CAPSULE ORAL 3 TIMES DAILY
COMMUNITY
Start: 2024-09-20

## 2024-10-04 NOTE — ASSESSMENT & PLAN NOTE
Blood pressure has been running at goal.  Continue below succinate 25 mg daily.  Refills were not needed today.  Labs were not needed today.  Continue to monitor.

## 2024-10-04 NOTE — PROGRESS NOTES
"Chief Complaint  ADD and Annual Exam    Subjective          Corey Velasquez presents to [facility] today for follow-up on chronic issues.     He is on Adderall for ADD.  We had to change to the 10 mg x3 immediate release due to back orders with the XR.  He has been stable on this regimen for many years with symptoms of concentration and focus being well controlled.  No adverse effects.  He was originally diagnosed by Dr. Zeng Psychiatry.  He does not take drug holidays.       He is on tizanidine, magnesium gluconate and acetaminophen OTC for chronic low back pain with left-sided sciatica.  He has previously used gabapentin (made him feel \"mean\").  He follows with Dr. Rothman ([facility], Spinal Surgery) and Dr. Retana ([facility], Pain Management).  Status post PT in the past.  He has a home exercise program which he completes regularly.  S/p epidurals and RFA through Pain Management.  He is going for R TKA with Dr. Rothman; scheduled on 10/23/2024.     He is on metoprolol succinate for HTN.  His BP has been well controlled at home.  No chest pain, palpitations or shortness of breath.  He is following with Cardiology.      He is on pantoprazole for gastritis and Clark's esophagus.  EGD done by Dr. Feldman showed strictures, severe gastritis and Clark's esophagus with repeat scope showing some improvement.  He is due for repeat colonoscopy, last done 2/2018.  Five year repeat recommended.  He is going to wait till after his knee replacement.      He is on duloxetine for depression.  He was weaned down on the fluoxetine at last visit after having been on it for over 40 years and started on the duloxetine at 30 mg.  Mood has been \"better.\"        Current Outpatient Medications:     amphetamine-dextroamphetamine (Adderall) 10 MG tablet, Take 3 tablets by mouth Daily., Disp: 90 tablet, Rfl: 0    DULoxetine (CYMBALTA) 30 MG capsule, Take 1 capsule by mouth Daily., Disp: 30 capsule, Rfl: " "5    gabapentin (NEURONTIN) 300 MG capsule, Take 1 capsule by mouth 3 (Three) Times a Day., Disp: , Rfl:     magnesium gluconate (MAGONATE) 500 MG tablet, Take 1 tablet by mouth 2 (Two) Times a Day., Disp: , Rfl:     metoprolol succinate XL (TOPROL-XL) 25 MG 24 hr tablet, Take 1 tablet by mouth Daily., Disp: 90 tablet, Rfl: 1    pantoprazole (PROTONIX) 40 MG EC tablet, TAKE ONE (1) TABLET BY MOUTH TWO (2) TIMES A DAY., Disp: 60 tablet, Rfl: 5    tiZANidine (ZANAFLEX) 2 MG tablet, Take 1 tablet by mouth 2 (Two) Times a Day As Needed for Muscle Spasms., Disp: 20 tablet, Rfl: 0  Medications Discontinued During This Encounter   Medication Reason    FLUoxetine (PROzac) 10 MG capsule Historical Med - Therapy completed           Allergies:  Shingrix [zoster vac recomb adjuvanted]      Objective   Vital Signs:   Vitals:    10/04/24 0922 10/04/24 0953   BP: 146/91 158/100   BP Location: Left arm    Patient Position: Sitting    Cuff Size: Large Adult    Pulse: 96 98   Temp: 98.4 °F (36.9 °C)    TempSrc: Oral    SpO2: 94%    Weight: 98 kg (216 lb)    Height: 170.2 cm (67.01\")        Body mass index is 33.82 kg/m².           Physical Exam  Vitals reviewed.   Constitutional:       General: He is not in acute distress.     Appearance: Normal appearance. He is well-developed.   HENT:      Head: Normocephalic and atraumatic.      Right Ear: Tympanic membrane, ear canal and external ear normal.      Left Ear: Tympanic membrane, ear canal and external ear normal.   Eyes:      Extraocular Movements: Extraocular movements intact.      Conjunctiva/sclera: Conjunctivae normal.      Pupils: Pupils are equal, round, and reactive to light.   Cardiovascular:      Rate and Rhythm: Normal rate and regular rhythm.      Pulses: Normal pulses.      Heart sounds: No murmur heard.  Pulmonary:      Effort: Pulmonary effort is normal.      Breath sounds: Normal breath sounds. No wheezing, rhonchi or rales.   Abdominal:      General: Bowel sounds are " normal. There is no distension.      Palpations: Abdomen is soft.      Tenderness: There is no abdominal tenderness.   Musculoskeletal:         General: Normal range of motion.   Neurological:      Mental Status: He is alert.   Psychiatric:         Mood and Affect: Affect normal.           Lab Results   Component Value Date    GLUCOSE 104 (H) 09/24/2024    BUN 7 (L) 09/24/2024    CREATININE 0.79 09/24/2024    EGFRIFNONA 99 11/23/2021    BCR 8.9 09/24/2024    K 4.4 09/24/2024    CO2 27.9 09/24/2024    CALCIUM 9.1 09/24/2024    ALBUMIN 4.2 03/22/2024    LABIL2 1.2 (L) 08/10/2020    AST 18 03/22/2024    ALT 18 03/22/2024       Lab Results   Component Value Date    CHOL 152 03/22/2024    CHLPL 173 08/10/2020    TRIG 156 (H) 03/22/2024    HDL 39 (L) 03/22/2024    LDL 86 03/22/2024       Lab Results   Component Value Date    WBC 6.09 09/24/2024    HGB 13.1 09/24/2024    HCT 39.7 09/24/2024    MCV 95.7 09/24/2024     09/24/2024            Assessment and Plan    Assessment & Plan  Attention deficit disorder (ADD) without hyperactivity  Stable on current regimen.  Symptoms are well controlled.  No adverse effects. He does require ongoing use of this controlled substance to function.  Tox screen was due today.  Prior tox screen appropriate.  ANGEL was run today.  Refills were not needed today.  RTC 3 months.  High risk medication use    Hypertension, essential  Blood pressure has been running at goal.  Continue below succinate 25 mg daily.  Refills were not needed today.  Labs were not needed today.  Continue to monitor.  Class 1 obesity due to excess calories with serious comorbidity and body mass index (BMI) of 33.0 to 33.9 in adult  Patient's (Body mass index is 33.82 kg/m².) indicates that they are obese (BMI >30) with health conditions that include hypertension and GERD . Weight is worsening. BMI  is above average; BMI management plan is completed. We discussed portion control and increasing exercise.   Eduardo's  esophagus without dysplasia  Stable on pantoprazole 40 mg daily.  Refills were not needed today.  Continue to monitor.  Wean PPI as able.  Acute superficial gastritis without hemorrhage  As per Clark's plan.   Chronic left-sided low back pain with left-sided sciatica  Following with Dr. Traci Retana at [facility].   Mild episode of recurrent major depressive disorder  Stable on duloxetine 30 mg daily.  Refills were needed today.  Continue to monitor.  Encounter for immunization      Orders Placed This Encounter   Procedures    Fluzone High-Dose 65+yrs    COVID-19 (Pfizer) 12yrs+ (COMIRNATY)    POC Medline 12 Panel Urine Drug Screen                      Follow Up   Return in about 3 months (around 1/4/2025) for Medicare Wellness.  Patient was given instructions and counseling regarding his condition or for health maintenance advice. Please see specific information pulled into the AVS if appropriate.           03/22/2024

## 2024-10-16 DIAGNOSIS — F98.8 ATTENTION DEFICIT DISORDER (ADD) WITHOUT HYPERACTIVITY: Chronic | ICD-10-CM

## 2024-10-16 NOTE — TELEPHONE ENCOUNTER
"     Caller: Corey Velasquez \"Herrera\"    Relationship: Self    Best call back number:  [phone]      Requested Prescriptions:   Requested Prescriptions     Pending Prescriptions Disp Refills    amphetamine-dextroamphetamine (Adderall) 10 MG tablet 90 tablet 0     Sig: Take 3 tablets by mouth Daily.        Pharmacy where request should be sent: [facility] [address] [phone] [facility] [phone] FX     Last office visit with prescribing clinician: 10/4/2024   Last telemedicine visit with prescribing clinician: Visit date not found   Next office visit with prescribing clinician: 2/6/2025     Additional details provided by patient:      Does the patient have less than a 3 day supply:  [x] Yes  [] No    Would you like a call back once the refill request has been completed: [] Yes [x] No    If the office needs to give you a call back, can they leave a voicemail: [] Yes [x] No    Jeanmarie Cosme Rep   10/16/24 13:41 EDT        "

## 2024-10-17 RX ORDER — DEXTROAMPHETAMINE SACCHARATE, AMPHETAMINE ASPARTATE, DEXTROAMPHETAMINE SULFATE AND AMPHETAMINE SULFATE 2.5; 2.5; 2.5; 2.5 MG/1; MG/1; MG/1; MG/1
30 TABLET ORAL DAILY
Qty: 90 TABLET | Refills: 0 | Status: SHIPPED | OUTPATIENT
Start: 2024-10-17

## 2024-11-14 DIAGNOSIS — F98.8 ATTENTION DEFICIT DISORDER (ADD) WITHOUT HYPERACTIVITY: Chronic | ICD-10-CM

## 2024-11-14 RX ORDER — DEXTROAMPHETAMINE SACCHARATE, AMPHETAMINE ASPARTATE, DEXTROAMPHETAMINE SULFATE AND AMPHETAMINE SULFATE 2.5; 2.5; 2.5; 2.5 MG/1; MG/1; MG/1; MG/1
30 TABLET ORAL DAILY
Qty: 90 TABLET | Refills: 0 | Status: SHIPPED | OUTPATIENT
Start: 2024-11-14

## 2024-11-14 NOTE — TELEPHONE ENCOUNTER
"Caller: Corey Velasquez \"Herrera\"    Relationship: Self    Best call back number: [phone]     Requested Prescriptions:   Requested Prescriptions     Pending Prescriptions Disp Refills    amphetamine-dextroamphetamine (Adderall) 10 MG tablet 90 tablet 0     Sig: Take 3 tablets by mouth Daily.        Pharmacy where request should be sent: [facility] [address] [phone] [facility] [phone] FX     Last office visit with prescribing clinician: 10/4/2024   Last telemedicine visit with prescribing clinician: Visit date not found   Next office visit with prescribing clinician: 2/6/2025     Additional details provided by patient:    Does the patient have less than a 3 day supply:  [x] Yes  [] No    Would you like a call back once the refill request has been completed: [x] Yes [] No  MYCHART     If the office needs to give you a call back, can they leave a voicemail: [x] Yes [] No    Jeanmarie Schulz Rep   11/14/24 12:26 EST         "

## 2024-12-08 DIAGNOSIS — F33.0 MILD EPISODE OF RECURRENT MAJOR DEPRESSIVE DISORDER: ICD-10-CM

## 2024-12-09 RX ORDER — DULOXETIN HYDROCHLORIDE 30 MG/1
30 CAPSULE, DELAYED RELEASE ORAL DAILY
Qty: 90 CAPSULE | Refills: 1 | Status: SHIPPED | OUTPATIENT
Start: 2024-12-09

## 2024-12-19 DIAGNOSIS — F33.0 MILD EPISODE OF RECURRENT MAJOR DEPRESSIVE DISORDER: ICD-10-CM

## 2024-12-19 DIAGNOSIS — I10 HYPERTENSION, ESSENTIAL: ICD-10-CM

## 2024-12-19 DIAGNOSIS — F98.8 ATTENTION DEFICIT DISORDER (ADD) WITHOUT HYPERACTIVITY: Chronic | ICD-10-CM

## 2024-12-19 RX ORDER — METOPROLOL SUCCINATE 25 MG/1
25 TABLET, EXTENDED RELEASE ORAL DAILY
Qty: 90 TABLET | Refills: 1 | Status: SHIPPED | OUTPATIENT
Start: 2024-12-19

## 2024-12-19 RX ORDER — DEXTROAMPHETAMINE SACCHARATE, AMPHETAMINE ASPARTATE, DEXTROAMPHETAMINE SULFATE AND AMPHETAMINE SULFATE 2.5; 2.5; 2.5; 2.5 MG/1; MG/1; MG/1; MG/1
30 TABLET ORAL DAILY
Qty: 90 TABLET | Refills: 0 | Status: SHIPPED | OUTPATIENT
Start: 2024-12-19

## 2024-12-19 RX ORDER — DULOXETIN HYDROCHLORIDE 30 MG/1
30 CAPSULE, DELAYED RELEASE ORAL DAILY
Qty: 90 CAPSULE | Refills: 1 | Status: SHIPPED | OUTPATIENT
Start: 2024-12-19

## 2024-12-19 NOTE — TELEPHONE ENCOUNTER
"  Caller: Corey Velasquez \"Herrera\"    Relationship: Self    Best call back number:     [phone] (Mobile)       Requested Prescriptions:   Requested Prescriptions     Pending Prescriptions Disp Refills    amphetamine-dextroamphetamine (Adderall) 10 MG tablet 90 tablet 0     Sig: Take 3 tablets by mouth Daily.    DULoxetine (CYMBALTA) 30 MG capsule 90 capsule 1     Sig: Take 1 capsule by mouth Daily.      metoprolol succinate XL (TOPROL-XL) 25 MG 24 hr tablet  25 mg, Daily     Pharmacy where request should be sent: [facility] [address] [phone] [facility] [phone] FX     Last office visit with prescribing clinician: 10/4/2024   Last telemedicine visit with prescribing clinician: Visit date not found   Next office visit with prescribing clinician: 2/6/2025     Additional details provided by patient: PHARMACY CHANGE TO [facility]ROL[facility]    Does the patient have less than a 3 day supply:  [x] Yes  [] No    Would you like a call back once the refill request has been completed: [] Yes [] No    If the office needs to give you a call back, can they leave a voicemail: [] Yes [] No    Jeanmarie Tran Rep   12/19/24 10:41 EST         "

## 2024-12-23 ENCOUNTER — PATIENT MESSAGE (OUTPATIENT)
Dept: FAMILY MEDICINE CLINIC | Age: 69
End: 2024-12-23
Payer: MEDICARE

## 2024-12-23 DIAGNOSIS — R53.83 OTHER FATIGUE: Primary | ICD-10-CM

## 2024-12-27 ENCOUNTER — TELEPHONE (OUTPATIENT)
Dept: FAMILY MEDICINE CLINIC | Age: 69
End: 2024-12-27
Payer: MEDICARE

## 2024-12-27 ENCOUNTER — LAB (OUTPATIENT)
Dept: LAB | Facility: HOSPITAL | Age: 69
End: 2024-12-27
Payer: MEDICARE

## 2024-12-27 DIAGNOSIS — R53.83 OTHER FATIGUE: ICD-10-CM

## 2024-12-27 DIAGNOSIS — Z87.891 PERSONAL HISTORY OF TOBACCO USE, PRESENTING HAZARDS TO HEALTH: ICD-10-CM

## 2024-12-27 DIAGNOSIS — Z12.2 ENCOUNTER FOR SCREENING FOR LUNG CANCER: Primary | ICD-10-CM

## 2024-12-27 LAB
ALBUMIN SERPL-MCNC: 4 G/DL (ref 3.5–5.2)
ALBUMIN/GLOB SERPL: 1.2 G/DL
ALP SERPL-CCNC: 286 U/L (ref 39–117)
ALT SERPL W P-5'-P-CCNC: 16 U/L (ref 1–41)
ANION GAP SERPL CALCULATED.3IONS-SCNC: 10 MMOL/L (ref 5–15)
AST SERPL-CCNC: 21 U/L (ref 1–40)
BILIRUB SERPL-MCNC: 0.2 MG/DL (ref 0–1.2)
BUN SERPL-MCNC: 6 MG/DL (ref 8–23)
BUN/CREAT SERPL: 7.4 (ref 7–25)
CALCIUM SPEC-SCNC: 9 MG/DL (ref 8.6–10.5)
CHLORIDE SERPL-SCNC: 103 MMOL/L (ref 98–107)
CO2 SERPL-SCNC: 26 MMOL/L (ref 22–29)
CREAT SERPL-MCNC: 0.81 MG/DL (ref 0.76–1.27)
EGFRCR SERPLBLD CKD-EPI 2021: 96.6 ML/MIN/1.73
FOLATE SERPL-MCNC: >20 NG/ML (ref 4.78–24.2)
GLOBULIN UR ELPH-MCNC: 3.3 GM/DL
GLUCOSE SERPL-MCNC: 91 MG/DL (ref 65–99)
HCT VFR BLD AUTO: 39.7 % (ref 37.5–51)
HGB BLD-MCNC: 13 G/DL (ref 13–17.7)
POTASSIUM SERPL-SCNC: 4.2 MMOL/L (ref 3.5–5.2)
PROT SERPL-MCNC: 7.3 G/DL (ref 6–8.5)
SODIUM SERPL-SCNC: 139 MMOL/L (ref 136–145)
TSH SERPL DL<=0.05 MIU/L-ACNC: 2.07 UIU/ML (ref 0.27–4.2)
VIT B12 BLD-MCNC: 482 PG/ML (ref 211–946)

## 2024-12-27 PROCEDURE — 85018 HEMOGLOBIN: CPT

## 2024-12-27 PROCEDURE — 82746 ASSAY OF FOLIC ACID SERUM: CPT

## 2024-12-27 PROCEDURE — 82607 VITAMIN B-12: CPT

## 2024-12-27 PROCEDURE — 85014 HEMATOCRIT: CPT

## 2024-12-27 PROCEDURE — 84443 ASSAY THYROID STIM HORMONE: CPT

## 2024-12-27 PROCEDURE — 36415 COLL VENOUS BLD VENIPUNCTURE: CPT

## 2024-12-27 PROCEDURE — 80053 COMPREHEN METABOLIC PANEL: CPT

## 2024-12-27 NOTE — TELEPHONE ENCOUNTER
[employer] message sent.  Please sign orders.    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  States she noticed bumps to her lower lip yesterday with a little swelling better today.   Denies fever, chills, headache, sore throat, cough, CP, SOB, ab pain, n/v/d, fatigue. Very well appearing.   Allergic to berries- does not believe she ate any foods with berries in it.   VSS 25 y/o F no pmh noticed bumps to her lower lip yesterday with pruritis and a little swelling better today. 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   Denies fever, chills, headache, sore throat, cough, CP, SOB, ab pain, n/v/d, fatigue. No rash to her hands and feet. No ulcerations in her mouth. Very well appearing. VSS.  Allergic to berries- does not believe she ate any foods with berries in it.  NKDA

## 2024-12-27 NOTE — TELEPHONE ENCOUNTER
----- Message from Cleopatra Higginbotham sent at 12/28/2023  4:56 PM EST -----  Please notify pt that it is time for  yearly low dose CT chest to screen for lung cancer.  Please pend order to me.  Thanks, MARYELLEN

## 2025-01-21 DIAGNOSIS — F98.8 ATTENTION DEFICIT DISORDER (ADD) WITHOUT HYPERACTIVITY: Chronic | ICD-10-CM

## 2025-01-21 NOTE — TELEPHONE ENCOUNTER
"  Caller: Corey Velasquez \"Herrera\"    Relationship: Self    Best call back number:     [phone]       Requested Prescriptions:   Requested Prescriptions     Pending Prescriptions Disp Refills    amphetamine-dextroamphetamine (Adderall) 10 MG tablet 90 tablet 0     Sig: Take 3 tablets by mouth Daily.        Pharmacy where request should be sent: [facility] [address] [phone] [facility] [phone] FX     Last office visit with prescribing clinician: 10/4/2024   Last telemedicine visit with prescribing clinician: Visit date not found   Next office visit with prescribing clinician: 2/6/2025     Does the patient have less than a 3 day supply:  [x] Yes  [] No    Would you like a call back once the refill request has been completed: [] Yes [x] No    If the office needs to give you a call back, can they leave a voicemail: [] Yes [x] No    Jeanmarie Pierce Rep   01/21/25 12:13 EST           "

## 2025-01-22 RX ORDER — DEXTROAMPHETAMINE SACCHARATE, AMPHETAMINE ASPARTATE, DEXTROAMPHETAMINE SULFATE AND AMPHETAMINE SULFATE 2.5; 2.5; 2.5; 2.5 MG/1; MG/1; MG/1; MG/1
30 TABLET ORAL DAILY
Qty: 90 TABLET | Refills: 0 | Status: SHIPPED | OUTPATIENT
Start: 2025-01-22

## 2025-02-06 ENCOUNTER — HOSPITAL ENCOUNTER (OUTPATIENT)
Dept: CT IMAGING | Facility: HOSPITAL | Age: 70
Discharge: HOME | End: 2025-02-06
Payer: MEDICARE

## 2025-02-06 ENCOUNTER — LAB (OUTPATIENT)
Dept: LAB | Facility: HOSPITAL | Age: 70
End: 2025-02-06
Payer: MEDICARE

## 2025-02-06 ENCOUNTER — OFFICE VISIT (OUTPATIENT)
Dept: FAMILY MEDICINE CLINIC | Age: 70
End: 2025-02-06
Payer: MEDICARE

## 2025-02-06 VITALS
TEMPERATURE: 98.4 F | OXYGEN SATURATION: 98 % | BODY MASS INDEX: 33.81 KG/M2 | SYSTOLIC BLOOD PRESSURE: 156 MMHG | WEIGHT: 215.4 LBS | HEART RATE: 96 BPM | HEIGHT: 67 IN | DIASTOLIC BLOOD PRESSURE: 86 MMHG

## 2025-02-06 DIAGNOSIS — F98.8 ATTENTION DEFICIT DISORDER (ADD) WITHOUT HYPERACTIVITY: Chronic | ICD-10-CM

## 2025-02-06 DIAGNOSIS — Z00.00 ANNUAL PHYSICAL EXAM: Primary | ICD-10-CM

## 2025-02-06 DIAGNOSIS — K22.70 BARRETT'S ESOPHAGUS WITHOUT DYSPLASIA: ICD-10-CM

## 2025-02-06 DIAGNOSIS — I10 HYPERTENSION, ESSENTIAL: ICD-10-CM

## 2025-02-06 DIAGNOSIS — Z87.891 PERSONAL HISTORY OF TOBACCO USE, PRESENTING HAZARDS TO HEALTH: ICD-10-CM

## 2025-02-06 DIAGNOSIS — K29.00 ACUTE SUPERFICIAL GASTRITIS WITHOUT HEMORRHAGE: ICD-10-CM

## 2025-02-06 DIAGNOSIS — R04.0 RECURRENT EPISTAXIS: ICD-10-CM

## 2025-02-06 DIAGNOSIS — Z12.5 SCREENING PSA (PROSTATE SPECIFIC ANTIGEN): ICD-10-CM

## 2025-02-06 DIAGNOSIS — Z79.899 HIGH RISK MEDICATION USE: ICD-10-CM

## 2025-02-06 DIAGNOSIS — F33.0 MILD EPISODE OF RECURRENT MAJOR DEPRESSIVE DISORDER: ICD-10-CM

## 2025-02-06 LAB
AMPHET+METHAMPHET UR QL: POSITIVE
AMPHETAMINES UR QL: NEGATIVE
BARBITURATES UR QL SCN: NEGATIVE
BENZODIAZ UR QL SCN: NEGATIVE
BUPRENORPHINE SERPL-MCNC: NEGATIVE NG/ML
CANNABINOIDS SERPL QL: NEGATIVE
CHOLEST SERPL-MCNC: 146 MG/DL (ref 0–200)
COCAINE UR QL: NEGATIVE
EXPIRATION DATE: ABNORMAL
HDLC SERPL-MCNC: 36 MG/DL (ref 40–60)
LDLC SERPL CALC-MCNC: 93 MG/DL (ref 0–100)
LDLC/HDLC SERPL: 2.58 {RATIO}
Lab: ABNORMAL
MDMA UR QL SCN: NEGATIVE
METHADONE UR QL SCN: NEGATIVE
MORPHINE/OPIATES SCREEN, URINE: NEGATIVE
OXYCODONE UR QL SCN: NEGATIVE
PCP UR QL SCN: NEGATIVE
PSA SERPL-MCNC: 1.09 NG/ML (ref 0–4)
TRIGL SERPL-MCNC: 86 MG/DL (ref 0–150)
VLDLC SERPL-MCNC: 17 MG/DL (ref 5–40)

## 2025-02-06 PROCEDURE — 99214 OFFICE O/P EST MOD 30 MIN: CPT | Performed by: FAMILY MEDICINE

## 2025-02-06 PROCEDURE — 80305 DRUG TEST PRSMV DIR OPT OBS: CPT | Performed by: FAMILY MEDICINE

## 2025-02-06 PROCEDURE — 1160F RVW MEDS BY RX/DR IN RCRD: CPT | Performed by: FAMILY MEDICINE

## 2025-02-06 PROCEDURE — 3077F SYST BP >= 140 MM HG: CPT | Performed by: FAMILY MEDICINE

## 2025-02-06 PROCEDURE — G0103 PSA SCREENING: HCPCS

## 2025-02-06 PROCEDURE — 36415 COLL VENOUS BLD VENIPUNCTURE: CPT

## 2025-02-06 PROCEDURE — G0439 PPPS, SUBSEQ VISIT: HCPCS | Performed by: FAMILY MEDICINE

## 2025-02-06 PROCEDURE — 1126F AMNT PAIN NOTED NONE PRSNT: CPT | Performed by: FAMILY MEDICINE

## 2025-02-06 PROCEDURE — 3079F DIAST BP 80-89 MM HG: CPT | Performed by: FAMILY MEDICINE

## 2025-02-06 PROCEDURE — 1159F MED LIST DOCD IN RCRD: CPT | Performed by: FAMILY MEDICINE

## 2025-02-06 PROCEDURE — G2211 COMPLEX E/M VISIT ADD ON: HCPCS | Performed by: FAMILY MEDICINE

## 2025-02-06 PROCEDURE — 1170F FXNL STATUS ASSESSED: CPT | Performed by: FAMILY MEDICINE

## 2025-02-06 PROCEDURE — 71271 CT THORAX LUNG CANCER SCR C-: CPT

## 2025-02-06 PROCEDURE — 80061 LIPID PANEL: CPT

## 2025-02-06 PROCEDURE — 96160 PT-FOCUSED HLTH RISK ASSMT: CPT | Performed by: FAMILY MEDICINE

## 2025-02-06 RX ORDER — COLESTIPOL HYDROCHLORIDE 1 G/1
1 TABLET ORAL 2 TIMES DAILY
COMMUNITY
Start: 2025-01-23 | End: 2025-03-09

## 2025-02-06 RX ORDER — PANTOPRAZOLE SODIUM 40 MG/1
40 TABLET, DELAYED RELEASE ORAL DAILY
Qty: 90 TABLET | Refills: 1 | Status: SHIPPED | OUTPATIENT
Start: 2025-02-06

## 2025-02-06 NOTE — PROGRESS NOTES
Subjective   The ABCs of the Annual Wellness Visit  Medicare Wellness Visit      Corey Velasquez is a 70 y.o. patient who presents for a Medicare Wellness Visit.  He is accompanied today by his wife Zenobia.     He is reportedly UTD on colonoscopy, last done 1/2025.  He is due for prostate cancer screening.  He is UTD on AAA screen, done 8/2023 and this was negative for AAA.  He is due for low-dose CT chest for lung cancer screening, last done 12/2023; already scheduled for this 2/6/2025.  He is UTD on [id number] (11/2022), Shingrix (12/2018, allergic to dose), Tdap (10/2019), COVID (10/2024), flu (10/2024).  He is due for routine labs including lipids and PSA.    The following portions of the patient's history were reviewed and   updated as appropriate: allergies, current medications, past family history, past medical history, past social history, past surgical history, and problem list.    Compared to one year ago, the patient's physical   health is the same.  Compared to one year ago, the patient's mental   health is the same.    Recent Hospitalizations:  He was not admitted to the hospital during the last year.     Current Medical Providers:  Patient Care Team:  Cleopatra Higginbotham MD as PCP - General (Family Medicine)    Outpatient Medications Prior to Visit   Medication Sig Dispense Refill    amphetamine-dextroamphetamine (Adderall) 10 MG tablet Take 3 tablets by mouth Daily. 90 tablet 0    colestipol (COLESTID) 1 g tablet Take 1 tablet by mouth 2 (Two) Times a Day.      DULoxetine (CYMBALTA) 30 MG capsule Take 1 capsule by mouth Daily. 90 capsule 1    magnesium gluconate (MAGONATE) 500 MG tablet Take 1 tablet by mouth 2 (Two) Times a Day.      metoprolol succinate XL (TOPROL-XL) 25 MG 24 hr tablet Take 1 tablet by mouth Daily. 90 tablet 1    pantoprazole (PROTONIX) 40 MG EC tablet TAKE ONE (1) TABLET BY MOUTH TWO (2) TIMES A DAY. 60 tablet 5    gabapentin (NEURONTIN) 300 MG capsule Take 1 capsule by mouth 3  "(Three) Times a Day. (Patient not taking: Reported on 2/6/2025)      tiZANidine (ZANAFLEX) 2 MG tablet Take 1 tablet by mouth 2 (Two) Times a Day As Needed for Muscle Spasms. (Patient not taking: Reported on 2/6/2025) 20 tablet 0     No facility-administered medications prior to visit.     No opioid medication identified on active medication list. I have reviewed chart for other potential  high risk medication/s and harmful drug interactions in the elderly.      Aspirin is not on active medication list.  Aspirin use is not indicated based on review of current medical condition/s. Risk of harm outweighs potential benefits.  .    Patient Active Problem List   Diagnosis    Hypertension, essential    Attention deficit disorder (ADD) without hyperactivity    Mild episode of recurrent major depressive disorder    Generalized osteoarthritis    Chronic pain of left knee    Failed total knee arthroplasty    Other fatigue    Iron deficiency anemia    Acute superficial gastritis without hemorrhage    Clark's esophagus without dysplasia    Chronic left-sided low back pain with left-sided sciatica    Elevated alkaline phosphatase level    Spondylolisthesis of lumbar region     Advance Care Planning Advance Directive is not on file.  ACP discussion was held with the patient during this visit. Patient has an advance directive (not in EMR), copy requested.            Objective   Vitals:    02/06/25 0945   BP: 156/86   BP Location: Left arm   Patient Position: Sitting   Cuff Size: Large Adult   Pulse: 96   Temp: 98.4 °F (36.9 °C)   TempSrc: Oral   SpO2: 98%   Weight: 97.7 kg (215 lb 6.4 oz)   Height: 170.2 cm (67.01\")   PainSc: 0-No pain       Estimated body mass index is 33.73 kg/m² as calculated from the following:    Height as of this encounter: 170.2 cm (67.01\").    Weight as of this encounter: 97.7 kg (215 lb 6.4 oz).                Does the patient have evidence of cognitive impairment? No                                     "                                                            Health  Risk Assessment    Smoking Status:  Social History     Tobacco Use   Smoking Status Former    Current packs/day: 0.00    Average packs/day: 2.0 packs/day for 30.0 years (60.0 ttl pk-yrs)    Types: Cigarettes    Start date: 1/15/1982    Quit date: 1/15/2012    Years since quittin.0    Passive exposure: Never   Smokeless Tobacco Never     Alcohol Consumption:  Social History     Substance and Sexual Activity   Alcohol Use Not Currently       Fall Risk Screen  STEADI Fall Risk Assessment was completed, and patient is at LOW risk for falls.Assessment completed on:2025    Depression Screening   Little interest or pleasure in doing things? Not at all   Feeling down, depressed, or hopeless? Not at all   PHQ-2 Total Score 0      Health Habits and Functional and Cognitive Screenin/6/2025     9:49 AM   Functional & Cognitive Status   Do you have difficulty preparing food and eating? No   Do you have difficulty bathing yourself, getting dressed or grooming yourself? No   Do you have difficulty using the toilet? No   Do you have difficulty moving around from place to place? No   Do you have trouble with steps or getting out of a bed or a chair? No   Current Diet Well Balanced Diet   Dental Exam Up to date   Eye Exam Up to date   Exercise (times per week) 5 times per week   Current Exercises Include Walking   Do you need help using the phone?  No   Are you deaf or do you have serious difficulty hearing?  Yes   Do you need help to go to places out of walking distance? No   Do you need help shopping? No   Do you need help preparing meals?  No   Do you need help with housework?  No   Do you need help with laundry? No   Do you need help taking your medications? No   Do you need help managing money? No   Do you ever drive or ride in a car without wearing a seat belt? No   Have you felt unusual stress, anger or loneliness in the last month? No   Who  do you live with? Spouse   If you need help, do you have trouble finding someone available to you? No   Have you been bothered in the last four weeks by sexual problems? No   Do you have difficulty concentrating, remembering or making decisions? Yes           Age-appropriate Screening Schedule:  Refer to the list below for future screening recommendations based on patient's age, sex and/or medical conditions. Orders for these recommended tests are listed in the plan section. The patient has been provided with a written plan.    Health Maintenance List  Health Maintenance   Topic Date Due    COLORECTAL CANCER SCREENING  02/09/2023    LUNG CANCER SCREENING  12/28/2024    BMI FOLLOWUP  10/04/2025    ANNUAL WELLNESS VISIT  02/06/2026    TDAP/TD VACCINES (2 - Td or Tdap) 10/01/2029    HEPATITIS C SCREENING  Completed    COVID-19 Vaccine  Completed    INFLUENZA VACCINE  Completed    Pneumococcal Vaccine 65+  Completed    AAA SCREEN ONCE  Completed                                                                                                                                                CMS Preventative Services Quick Reference  Risk Factors Identified During Encounter  None Identified    The above risks/problems have been discussed with the patient.  Pertinent information has been shared with the patient in the After Visit Summary.  An After Visit Summary and PPPS were made available to the patient.    Follow Up:   Next Medicare Wellness visit to be scheduled in 1 year.         Additional E&M Note during same encounter follows:  Patient has additional, significant, and separately identifiable condition(s)/problem(s) that require work above and beyond the Medicare Wellness Visit     Chief Complaint  Medicare Wellness-subsequent    Subjective   HPI  Herrera is also being seen today for additional medical problem/s.    He has noticed recurrent nosebleeds for the past 2 weeks, between 5:00-7:00AM every morning like  "clockwork.  It takes 5-15 min, even with the application of pressure.  He does have a humidifier that he runs in the bedroom at night.    He is on Adderall for ADD.  Concentration and focus are well controlled.  No adverse effects.  He was originally diagnosed by Dr. Zeng Psychiat[facility].  He does not take drug holidays.  He has been on this regimen for decades.     He is on tizanidine, magnesium gluconate and acetaminophen OTC for chronic low back pain with L-sided sciatica.  He has previously used gabapentin (made him feel \"mean\").  He is following with Dr. Rothman ([facility], Spinal Surgery) and Dr. Retana ([facility], Pain Management).  S/p PT.  He still does his home exercise program daily.  S/p epidurals and RFA.  S/p R TKA back in October.  Doing great with that.    He is on metoprolol succinate for hypertension.  His blood pressure has been well controlled at home.  Denies chest pain, palpitations or shortness of breath.  Follows with Cards.      He is on pantoprazole for gastritis and Clark's esophagus.  Repeat scope 2022 by Dr. Feldman showed improvement in strictures, gastritis and Clark's seen with his initial scope in 2007.  He had EGD/colonoscopy with Dr. Feldman back in Jan.  Colonoscopy looked good by report.  EGD showed bile acid gastritis so he was started on colestipol.  Goes back in March for f/u.      He is on duloxetine for depression.  Mood has been \"fine.\"  His physical issues issues are getting much better, which has helped.  He has been on fluoxetine previously (ineffective).    Review of Systems   Constitutional:  Negative for chills, fatigue and fever.   HENT:  Positive for nosebleeds. Negative for congestion, hearing loss and rhinorrhea.    Eyes:  Negative for pain and visual disturbance.   Respiratory:  Negative for cough and shortness of breath.    Cardiovascular:  Negative for chest pain and palpitations.   Gastrointestinal:  Negative for abdominal pain, constipation, diarrhea, nausea and " "vomiting.   Genitourinary:  Negative for difficulty urinating and dysuria.   Musculoskeletal:  Negative for arthralgias and myalgias.   Neurological:  Negative for weakness and numbness.   Psychiatric/Behavioral:  Negative for dysphoric mood and sleep disturbance. The patient is not nervous/anxious.               Objective   Vital Signs:  /86 (BP Location: Left arm, Patient Position: Sitting, Cuff Size: Large Adult)   Pulse 96   Temp 98.4 °F (36.9 °C) (Oral)   Ht 170.2 cm (67.01\")   Wt 97.7 kg (215 lb 6.4 oz)   SpO2 98%   BMI 33.73 kg/m²   Physical Exam  Vitals reviewed.   Constitutional:       General: He is not in acute distress.     Appearance: Normal appearance. He is well-developed.   HENT:      Head: Normocephalic and atraumatic.      Right Ear: External ear normal.      Left Ear: External ear normal.      Mouth/Throat:      Mouth: Mucous membranes are moist.   Eyes:      Extraocular Movements: Extraocular movements intact.      Conjunctiva/sclera: Conjunctivae normal.      Pupils: Pupils are equal, round, and reactive to light.   Cardiovascular:      Rate and Rhythm: Normal rate and regular rhythm.      Heart sounds: No murmur heard.  Pulmonary:      Effort: Pulmonary effort is normal.      Breath sounds: Normal breath sounds. No wheezing, rhonchi or rales.   Abdominal:      General: Bowel sounds are normal. There is no distension.      Palpations: Abdomen is soft.      Tenderness: There is no abdominal tenderness.   Musculoskeletal:         General: Normal range of motion.   Skin:     General: Skin is warm and dry.   Neurological:      Mental Status: He is alert and oriented to person, place, and time.      Deep Tendon Reflexes: Reflexes normal.   Psychiatric:         Mood and Affect: Mood and affect normal.         Behavior: Behavior normal.         Thought Content: Thought content normal.         Judgment: Judgment normal.       Lab Results   Component Value Date    GLUCOSE 91 12/27/2024    BUN " 6 (L) 12/27/2024    CREATININE 0.81 12/27/2024    EGFR 96.6 12/27/2024    BCR 7.4 12/27/2024    K 4.2 12/27/2024    CO2 26.0 12/27/2024    CALCIUM 9.0 12/27/2024    ALBUMIN 4.0 12/27/2024    BILITOT 0.2 12/27/2024    AST 21 12/27/2024    ALT 16 12/27/2024       Lab Results   Component Value Date    CHOL 152 03/22/2024    CHLPL 173 08/10/2020    TRIG 156 (H) 03/22/2024    HDL 39 (L) 03/22/2024    LDL 86 03/22/2024       Lab Results   Component Value Date    WBC 6.09 09/24/2024    HGB 13.0 12/27/2024    HCT 39.7 12/27/2024    MCV 95.7 09/24/2024     09/24/2024                     Assessment and Plan            Annual physical exam  He is reportedly UTD on colonoscopy, last done 1/2025; records requested.  He is due for prostate cancer screening; PSA ordered.  He is UTD on AAA screen, done 8/2023 and this was negative for AAA.  He is due for low-dose CT chest for lung cancer screening, last done 12/2023; already scheduled for this 2/6/2025.  He is UTD on [id number] (11/2022), Shingrix (12/2018, allergic to dose), Tdap (10/2019), COVID (10/2024), flu (10/2024).  He is due for routine labs including lipids and PSA; ordered.       Attention deficit disorder (ADD) without hyperactivity    Stable on current regimen.  Symptoms are well controlled.  No adverse effects. He does require ongoing use of this controlled substance to function.  Tox screen was due today.  Prior tox screen appropriate.  ANGEL was run today.  Refills were not needed today.  RTC 3 months.       High risk medication use    Orders:    POC Medline 12 Panel Urine Drug Screen    Mild episode of recurrent major depressive disorder    Stable on duloxetine 30 mg daily.  Refills were not needed today.  Continue to monitor.       Hypertension, essential    Blood pressure has been running at goal.  Continue metoprolol succinate 25 mg daily.  Refills were not needed today.  Labs were needed today.  Continue to monitor.  Orders:    Lipid Panel;  Future    Acute superficial gastritis without hemorrhage  Stable on pantoprazole daily.  Refills were needed today.  Continue to monitor.  Wean PPI as able.  Orders:    pantoprazole (PROTONIX) 40 MG EC tablet; Take 1 tablet by mouth Daily.    Clark's esophagus without dysplasia  As per GERD plan.   Orders:    pantoprazole (PROTONIX) 40 MG EC tablet; Take 1 tablet by mouth Daily.    Recurrent epistaxis  Irrigate regularly with nasal saline.  If he is still having problems in a couple of weeks.         Screening PSA (prostate specific antigen)    Orders:    PSA Screen; Future            Follow Up   Return in about 3 months (around 5/6/2025) for Recheck.  Patient was given instructions and counseling regarding his condition or for health maintenance advice. Please see specific information pulled into the AVS if appropriate.

## 2025-02-06 NOTE — ASSESSMENT & PLAN NOTE
As per GERD plan.   Orders:    pantoprazole (PROTONIX) 40 MG EC tablet; Take 1 tablet by mouth Daily.

## 2025-02-06 NOTE — ASSESSMENT & PLAN NOTE
Stable on pantoprazole daily.  Refills were needed today.  Continue to monitor.  Wean PPI as able.  Orders:    pantoprazole (PROTONIX) 40 MG EC tablet; Take 1 tablet by mouth Daily.

## 2025-02-06 NOTE — ASSESSMENT & PLAN NOTE
Blood pressure has been running at goal.  Continue metoprolol succinate 25 mg daily.  Refills were not needed today.  Labs were needed today.  Continue to monitor.  Orders:    Lipid Panel; Future

## 2025-02-10 DIAGNOSIS — K29.00 ACUTE SUPERFICIAL GASTRITIS WITHOUT HEMORRHAGE: ICD-10-CM

## 2025-02-10 DIAGNOSIS — K22.70 BARRETT'S ESOPHAGUS WITHOUT DYSPLASIA: ICD-10-CM

## 2025-02-10 RX ORDER — PANTOPRAZOLE SODIUM 40 MG/1
40 TABLET, DELAYED RELEASE ORAL 2 TIMES DAILY
Qty: 60 TABLET | Refills: 3 | OUTPATIENT
Start: 2025-02-10

## 2025-02-17 DIAGNOSIS — F98.8 ATTENTION DEFICIT DISORDER (ADD) WITHOUT HYPERACTIVITY: Chronic | ICD-10-CM

## 2025-02-17 RX ORDER — DEXTROAMPHETAMINE SACCHARATE, AMPHETAMINE ASPARTATE, DEXTROAMPHETAMINE SULFATE AND AMPHETAMINE SULFATE 2.5; 2.5; 2.5; 2.5 MG/1; MG/1; MG/1; MG/1
3 TABLET ORAL DAILY
Qty: 90 TABLET | Refills: 0 | OUTPATIENT
Start: 2025-02-17

## 2025-02-17 NOTE — TELEPHONE ENCOUNTER
"Caller: Corey Velasquez \"Herrera\"    Relationship: Self    Best call back number: [phone]     Requested Prescriptions:   Requested Prescriptions     Pending Prescriptions Disp Refills    amphetamine-dextroamphetamine (Adderall) 10 MG tablet 90 tablet 0     Sig: Take 3 tablets by mouth Daily.        Pharmacy where request should be sent: [facility] [address] - [phone] [facility] [phone]     Last office visit with prescribing clinician: 2/6/2025   Last telemedicine visit with prescribing clinician: Visit date not found   Next office visit with prescribing clinician: 5/29/2025     Does the patient have less than a 3 day supply:  [x] Yes  [] No    Jeanmarie Ortega Rep   02/17/25 09:09 EST   "

## 2025-02-18 DIAGNOSIS — F98.8 ATTENTION DEFICIT DISORDER (ADD) WITHOUT HYPERACTIVITY: Chronic | ICD-10-CM

## 2025-02-18 RX ORDER — DEXTROAMPHETAMINE SACCHARATE, AMPHETAMINE ASPARTATE, DEXTROAMPHETAMINE SULFATE AND AMPHETAMINE SULFATE 2.5; 2.5; 2.5; 2.5 MG/1; MG/1; MG/1; MG/1
3 TABLET ORAL DAILY
Qty: 90 TABLET | Refills: 0 | OUTPATIENT
Start: 2025-02-18

## 2025-02-19 DIAGNOSIS — F98.8 ATTENTION DEFICIT DISORDER (ADD) WITHOUT HYPERACTIVITY: Chronic | ICD-10-CM

## 2025-02-19 RX ORDER — DEXTROAMPHETAMINE SACCHARATE, AMPHETAMINE ASPARTATE, DEXTROAMPHETAMINE SULFATE AND AMPHETAMINE SULFATE 2.5; 2.5; 2.5; 2.5 MG/1; MG/1; MG/1; MG/1
3 TABLET ORAL DAILY
Qty: 90 TABLET | Refills: 0 | OUTPATIENT
Start: 2025-02-19

## 2025-02-19 RX ORDER — DEXTROAMPHETAMINE SACCHARATE, AMPHETAMINE ASPARTATE, DEXTROAMPHETAMINE SULFATE AND AMPHETAMINE SULFATE 2.5; 2.5; 2.5; 2.5 MG/1; MG/1; MG/1; MG/1
30 TABLET ORAL DAILY
Qty: 90 TABLET | Refills: 0 | Status: SHIPPED | OUTPATIENT
Start: 2025-02-19

## 2025-02-19 NOTE — TELEPHONE ENCOUNTER
Controlled refill request:  Requested Prescriptions     Pending Prescriptions Disp Refills    amphetamine-dextroamphetamine (ADDERALL) 10 MG tablet [Pharmacy Med Name: dextroamphetamine-amphetamine 10 mg tablet] 90 tablet 0     Sig: TAKE 3 TABLETS BY MOUTH ONCE DAILY      Last OV:  2/6/2025  Next OV:  5/29/2025  Last fill:  1/22/25  Last tox:  2/6/25    Unit RN to OR RN

## 2025-03-20 DIAGNOSIS — F98.8 ATTENTION DEFICIT DISORDER (ADD) WITHOUT HYPERACTIVITY: Chronic | ICD-10-CM

## 2025-03-20 DIAGNOSIS — K29.00 ACUTE SUPERFICIAL GASTRITIS WITHOUT HEMORRHAGE: ICD-10-CM

## 2025-03-20 DIAGNOSIS — K22.70 BARRETT'S ESOPHAGUS WITHOUT DYSPLASIA: ICD-10-CM

## 2025-03-20 RX ORDER — PANTOPRAZOLE SODIUM 40 MG/1
40 TABLET, DELAYED RELEASE ORAL DAILY
Qty: 90 TABLET | Refills: 1 | Status: SHIPPED | OUTPATIENT
Start: 2025-03-20

## 2025-03-20 RX ORDER — DEXTROAMPHETAMINE SACCHARATE, AMPHETAMINE ASPARTATE, DEXTROAMPHETAMINE SULFATE AND AMPHETAMINE SULFATE 2.5; 2.5; 2.5; 2.5 MG/1; MG/1; MG/1; MG/1
30 TABLET ORAL DAILY
Qty: 90 TABLET | Refills: 0 | Status: SHIPPED | OUTPATIENT
Start: 2025-03-20

## 2025-03-20 NOTE — TELEPHONE ENCOUNTER
Controlled refill request:  Requested Prescriptions     Pending Prescriptions Disp Refills    amphetamine-dextroamphetamine (Adderall) 10 MG tablet 90 tablet 0     Sig: Take 3 tablets by mouth Daily.    pantoprazole (PROTONIX) 40 MG EC tablet 90 tablet 1     Sig: Take 1 tablet by mouth Daily.      Last OV:  2/6/2025  Next OV:  5/29/2025  Last fill:  2/19/25  Last tox:  2/6/25

## 2025-03-20 NOTE — TELEPHONE ENCOUNTER
"  Caller: Corey Velasquez \"Herrera\"    Relationship: Self    Best call back number: [phone]    Requested Prescriptions:   Requested Prescriptions     Pending Prescriptions Disp Refills    amphetamine-dextroamphetamine (Adderall) 10 MG tablet 90 tablet 0     Sig: Take 3 tablets by mouth Daily.    pantoprazole (PROTONIX) 40 MG EC tablet 90 tablet 1     Sig: Take 1 tablet by mouth Daily.        Pharmacy where request should be sent: [facility] [address] [phone] [facility] [phone]      Last office visit with prescribing clinician: 2/6/2025   Last telemedicine visit with prescribing clinician: Visit date not found   Next office visit with prescribing clinician: 5/29/2025     Additional details provided by patient: PATIENT IS OUT OF REFILLS ON THE PROTONIX AND NEEDS NEW PRESCRIPTIONS SENT TO PHARMACY ASAP. HE HAS LESS THAN A THREE DAY SUPPLY OF THESE MEDICATIONS.    Does the patient have less than a 3 day supply:  [x] Yes  [] No    Would you like a call back once the refill request has been completed: [] Yes [] No    If the office needs to give you a call back, can they leave a voicemail: [] Yes [] No    Jeanmarie Quintero   03/20/25 12:09 EDT           "

## 2025-04-18 DIAGNOSIS — F98.8 ATTENTION DEFICIT DISORDER (ADD) WITHOUT HYPERACTIVITY: Chronic | ICD-10-CM

## 2025-04-18 RX ORDER — DEXTROAMPHETAMINE SACCHARATE, AMPHETAMINE ASPARTATE, DEXTROAMPHETAMINE SULFATE AND AMPHETAMINE SULFATE 2.5; 2.5; 2.5; 2.5 MG/1; MG/1; MG/1; MG/1
30 TABLET ORAL DAILY
Qty: 90 TABLET | Refills: 0 | Status: SHIPPED | OUTPATIENT
Start: 2025-04-18

## 2025-04-18 NOTE — TELEPHONE ENCOUNTER
"Caller: Corey Velasquez \"Herrera\"    Relationship: Self    Best call back number: [phone]    Requested Prescriptions:   Requested Prescriptions     Pending Prescriptions Disp Refills    amphetamine-dextroamphetamine (Adderall) 10 MG tablet 90 tablet 0     Sig: Take 3 tablets by mouth Daily.        Pharmacy where request should be sent: [facility] [address] [phone] [facility] [phone] FX     Last office visit with prescribing clinician: 2/6/2025   Last telemedicine visit with prescribing clinician: Visit date not found   Next office visit with prescribing clinician: 5/29/2025       Does the patient have less than a 3 day supply:  [x] Yes  [] No    Would you like a call back once the refill request has been completed: [] Yes [x] No    If the office needs to give you a call back, can they leave a voicemail: [] Yes [x] No    Jeanmarie Gonzalez Rep   04/18/25 12:35 EDT           "

## 2025-04-18 NOTE — TELEPHONE ENCOUNTER
Controlled refill request:  Requested Prescriptions     Pending Prescriptions Disp Refills    amphetamine-dextroamphetamine (Adderall) 10 MG tablet 90 tablet 0     Sig: Take 3 tablets by mouth Daily.      Last OV:  2/6/2025  Next OV:  5/29/2025  Last fill:  3/20/25  Last tox:  2/6/25    Health Care Proxy (HCP)

## 2025-05-23 DIAGNOSIS — F98.8 ATTENTION DEFICIT DISORDER (ADD) WITHOUT HYPERACTIVITY: Chronic | ICD-10-CM

## 2025-05-23 RX ORDER — DEXTROAMPHETAMINE SACCHARATE, AMPHETAMINE ASPARTATE, DEXTROAMPHETAMINE SULFATE AND AMPHETAMINE SULFATE 2.5; 2.5; 2.5; 2.5 MG/1; MG/1; MG/1; MG/1
30 TABLET ORAL DAILY
Qty: 90 TABLET | Refills: 0 | Status: SHIPPED | OUTPATIENT
Start: 2025-05-23

## 2025-05-23 NOTE — TELEPHONE ENCOUNTER
Controlled refill request:  Requested Prescriptions     Pending Prescriptions Disp Refills    amphetamine-dextroamphetamine (Adderall) 10 MG tablet 90 tablet 0     Sig: Take 3 tablets by mouth Daily.      Last OV:  2/6/2025  Next OV:  5/29/2025  Last fill:  4/18/25  Last tox:  2/6/25

## 2025-05-23 NOTE — TELEPHONE ENCOUNTER
"Caller: Corey Velasquez \"Herrera\"    Relationship: Self    Best call back number:     [phone]       Requested Prescriptions:   Requested Prescriptions     Pending Prescriptions Disp Refills    amphetamine-dextroamphetamine (Adderall) 10 MG tablet 90 tablet 0     Sig: Take 3 tablets by mouth Daily.        Pharmacy where request should be sent: [facility] [address] [phone] [facility] [phone] FX     Last office visit with prescribing clinician: 2/6/2025   Last telemedicine visit with prescribing clinician: Visit date not found   Next office visit with prescribing clinician: 5/29/2025       Does the patient have less than a 3 day supply:  [x] Yes  [] No    Would you like a call back once the refill request has been completed: [] Yes [x] No    If the office needs to give you a call back, can they leave a voicemail: [] Yes [x] No    Jeanmarie Pierce Rep   05/23/25 09:06 EDT           "

## 2025-05-29 ENCOUNTER — OFFICE VISIT (OUTPATIENT)
Dept: FAMILY MEDICINE CLINIC | Age: 70
End: 2025-05-29
Payer: MEDICARE

## 2025-05-29 VITALS
OXYGEN SATURATION: 100 % | WEIGHT: 211.6 LBS | HEART RATE: 96 BPM | DIASTOLIC BLOOD PRESSURE: 94 MMHG | HEIGHT: 67 IN | SYSTOLIC BLOOD PRESSURE: 165 MMHG | BODY MASS INDEX: 33.21 KG/M2

## 2025-05-29 DIAGNOSIS — E16.2 HYPOGLYCEMIA: ICD-10-CM

## 2025-05-29 DIAGNOSIS — I10 HYPERTENSION, ESSENTIAL: ICD-10-CM

## 2025-05-29 DIAGNOSIS — F98.8 ATTENTION DEFICIT DISORDER (ADD) WITHOUT HYPERACTIVITY: Primary | Chronic | ICD-10-CM

## 2025-05-29 DIAGNOSIS — R07.9 CHEST PAIN, UNSPECIFIED TYPE: ICD-10-CM

## 2025-05-29 DIAGNOSIS — K22.70 BARRETT'S ESOPHAGUS WITHOUT DYSPLASIA: ICD-10-CM

## 2025-05-29 DIAGNOSIS — F33.0 MILD EPISODE OF RECURRENT MAJOR DEPRESSIVE DISORDER: ICD-10-CM

## 2025-05-29 DIAGNOSIS — Z79.899 HIGH RISK MEDICATION USE: ICD-10-CM

## 2025-05-29 RX ORDER — BLOOD-GLUCOSE METER
KIT MISCELLANEOUS
Qty: 1 EACH | Refills: 0 | Status: SHIPPED | OUTPATIENT
Start: 2025-05-29

## 2025-05-29 RX ORDER — METOPROLOL SUCCINATE 25 MG/1
25 TABLET, EXTENDED RELEASE ORAL DAILY
Qty: 90 TABLET | Refills: 1 | Status: SHIPPED | OUTPATIENT
Start: 2025-05-29

## 2025-05-29 RX ORDER — AVOBENZONE, HOMOSALATE, OCTISALATE, OCTOCRYLENE 30; 40; 45; 26 MG/ML; MG/ML; MG/ML; MG/ML
CREAM TOPICAL
Qty: 100 EACH | Refills: 3 | Status: SHIPPED | OUTPATIENT
Start: 2025-05-29

## 2025-05-29 RX ORDER — DULOXETIN HYDROCHLORIDE 30 MG/1
30 CAPSULE, DELAYED RELEASE ORAL DAILY
Qty: 90 CAPSULE | Refills: 1 | Status: SHIPPED | OUTPATIENT
Start: 2025-05-29

## 2025-05-29 NOTE — ASSESSMENT & PLAN NOTE
Stable on duloxetine 30 mg daily.  Refills were needed today.  Continue to monitor.  Orders:    DULoxetine (CYMBALTA) 30 MG capsule; Take 1 capsule by mouth Daily.

## 2025-05-29 NOTE — ASSESSMENT & PLAN NOTE
Blood pressure has been running at goal.  Continue metoprolol succinate 25 mg daily.  Refills were needed today.  Labs were not needed today.  Continue to monitor.  Orders:    metoprolol succinate XL (TOPROL-XL) 25 MG 24 hr tablet; Take 1 tablet by mouth Daily.

## 2025-05-29 NOTE — ASSESSMENT & PLAN NOTE
Stable on pantoprazole 40 mg daily.  Refills were not needed today.  Continue to monitor.  Wean PPI as able.

## 2025-05-29 NOTE — PROGRESS NOTES
"Chief Complaint  ADD    Subjective          Corey Velasquez presents to [facility] today for follow-up of routine issues.     He is on Adderall for ADD.  He was originally diagnosed by Dr. Zeng [facility].  He does not take drug holidays.  He has been on this regimen for decades.     He is on tizanidine, magnesium gluconate and acetaminophen OTC for chronic low back pain with L-sided sciatica.  He has previously been on gabapentin (made him feel \"mean\").  He follows with Dr. Rothman ([facility]) and Dr. [facility].  S/p PT.  S/p epidurals and RFA.  S/p R TKA back in October.     He is on metoprolol succinate for HTN.  He follows with Cardiology.  BP is elevated today.  He has been experiencing some sharp, midline chest pains.  Last 15-20 min.  Occurs at nighttime consistently.  Never occurs with exertion.        He is on pantoprazole for bile acid gastritis and Clark's esophagus.  EGD 2022 by Dr. Feldman showed improvement in strictures, gastritis and Clark's.  Colonoscopy 1/2025 showed polyps with 3 year repeat recommended.  He has previously been on colestipol (no longer needed) and Linzess (diarrhea).  No diarrhea or constipation to speak of.     He has episodes of sweating and feeling tremulous.  +Lethargic.  After an hour       He is on duloxetine for depression.  He has previously been on fluoxetine (ineffective).  Mood has been \"okay.\"       Current Outpatient Medications:     amphetamine-dextroamphetamine (Adderall) 10 MG tablet, Take 3 tablets by mouth Daily., Disp: 90 tablet, Rfl: 0    DULoxetine (CYMBALTA) 30 MG capsule, Take 1 capsule by mouth Daily., Disp: 90 capsule, Rfl: 1    magnesium gluconate (MAGONATE) 500 MG tablet, Take 1 tablet by mouth 2 (Two) Times a Day., Disp: , Rfl:     metoprolol succinate XL (TOPROL-XL) 25 MG 24 hr tablet, Take 1 tablet by mouth Daily., Disp: 90 tablet, Rfl: 1    pantoprazole (PROTONIX) 40 MG EC tablet, " "Take 1 tablet by mouth Daily., Disp: 90 tablet, Rfl: 1    glucose blood test strip, Use as instructed to check blood sugar daily Dx E16.2, Disp: 100 each, Rfl: 3    glucose monitor monitoring kit, Use as directed; check blood sugar once daily DxE16.2, Disp: 1 each, Rfl: 0    Lancets misc, Check as directed, dx E16.2, Disp: 100 each, Rfl: 3  Medications Discontinued During This Encounter   Medication Reason    DULoxetine (CYMBALTA) 30 MG capsule Reorder    metoprolol succinate XL (TOPROL-XL) 25 MG 24 hr tablet Reorder    colestipol (COLESTID) 1 g tablet          Allergies:  Shingrix [zoster vac recomb adjuvanted]      Objective   Vital Signs:   Vitals:    05/29/25 1120   BP: 167/91   BP Location: Left arm   Patient Position: Sitting   Cuff Size: Adult   Pulse: 96   SpO2: 100%   Weight: 96 kg (211 lb 9.6 oz)   Height: 170.2 cm (67.01\")     Body mass index is 33.13 kg/m².           Physical Exam  Vitals reviewed.   Constitutional:       General: He is not in acute distress.     Appearance: Normal appearance. He is well-developed.   HENT:      Head: Normocephalic and atraumatic.      Right Ear: External ear normal.      Left Ear: External ear normal.   Eyes:      Extraocular Movements: Extraocular movements intact.      Conjunctiva/sclera: Conjunctivae normal.      Pupils: Pupils are equal, round, and reactive to light.   Cardiovascular:      Rate and Rhythm: Normal rate and regular rhythm.      Heart sounds: No murmur heard.  Pulmonary:      Effort: Pulmonary effort is normal.      Breath sounds: Normal breath sounds. No wheezing, rhonchi or rales.   Abdominal:      General: Bowel sounds are normal. There is no distension.      Palpations: Abdomen is soft.      Tenderness: There is no abdominal tenderness.   Musculoskeletal:         General: Normal range of motion.   Neurological:      Mental Status: He is alert.   Psychiatric:         Mood and Affect: Affect normal.         Lab Results   Component Value Date    " GLUCOSE 91 12/27/2024    BUN 6 (L) 12/27/2024    CREATININE 0.81 12/27/2024    EGFRIFNONA 99 11/23/2021    BCR 7.4 12/27/2024    K 4.2 12/27/2024    CO2 26.0 12/27/2024    CALCIUM 9.0 12/27/2024    ALBUMIN 4.0 12/27/2024    AST 21 12/27/2024    ALT 16 12/27/2024       Lab Results   Component Value Date    CHOL 146 02/06/2025    CHLPL 173 08/10/2020    TRIG 86 02/06/2025    HDL 36 (L) 02/06/2025    LDL 93 02/06/2025       Lab Results   Component Value Date    WBC 6.09 09/24/2024    HGB 13.0 12/27/2024    HCT 39.7 12/27/2024    MCV 95.7 09/24/2024     09/24/2024            Assessment and Plan    Assessment & Plan  Attention deficit disorder (ADD) without hyperactivity    Stable on current regimen.  Symptoms are well controlled.  No adverse effects. He does require ongoing use of this controlled substance to function.  Tox screen was due today.  Prior tox screen appropriate.  ANGEL was run today.  Refills were not needed today.  RTC 3 months.       High risk medication use    Orders:    POC Medline 12 Panel Urine Drug Screen    Mild episode of recurrent major depressive disorder    Stable on duloxetine 30 mg daily.  Refills were needed today.  Continue to monitor.  Orders:    DULoxetine (CYMBALTA) 30 MG capsule; Take 1 capsule by mouth Daily.    Hypertension, essential    Blood pressure has been running at goal.  Continue metoprolol succinate 25 mg daily.  Refills were needed today.  Labs were not needed today.  Continue to monitor.  Orders:    metoprolol succinate XL (TOPROL-XL) 25 MG 24 hr tablet; Take 1 tablet by mouth Daily.    Hypoglycemia  He has been having spells that sound like hypoglycemic spells.  Glucometer ordered for him to start checking BG at home when he has these spells.  Recommend more regular meals.    Orders:    glucose blood test strip; Use as instructed to check blood sugar daily Dx E16.2    glucose monitor monitoring kit; Use as directed; check blood sugar once daily DxE16.2    Lancets  misc; Check as directed, dx E16.2    Clark's esophagus without dysplasia  Stable on pantoprazole 40 mg daily.  Refills were not needed today.  Continue to monitor.  Wean PPI as able.       Chest pain, unspecified type  Chest pain is atypical but has some concerning features for angina.  I have strongly encouraged him to go for stress test.  He would like to defer that for now.  He is aware that we are unable to diagnose potentially life-threatening blockage without a stress test.  Strong ED precautions.                   Follow Up   Return in about 3 months (around 8/29/2025) for Recheck.  Patient was given instructions and counseling regarding his condition or for health maintenance advice. Please see specific information pulled into the AVS if appropriate.           05/29/2025

## 2025-06-03 ENCOUNTER — OFFICE VISIT (OUTPATIENT)
Dept: CARDIOLOGY | Facility: CLINIC | Age: 70
End: 2025-06-03
Payer: MEDICARE

## 2025-06-03 VITALS
WEIGHT: 211.6 LBS | DIASTOLIC BLOOD PRESSURE: 85 MMHG | BODY MASS INDEX: 33.21 KG/M2 | HEIGHT: 67 IN | OXYGEN SATURATION: 97 % | SYSTOLIC BLOOD PRESSURE: 126 MMHG | HEART RATE: 95 BPM

## 2025-06-03 DIAGNOSIS — I10 HYPERTENSION, ESSENTIAL: ICD-10-CM

## 2025-06-03 DIAGNOSIS — R07.2 PRECORDIAL PAIN: Primary | ICD-10-CM

## 2025-06-03 PROCEDURE — 99204 OFFICE O/P NEW MOD 45 MIN: CPT | Performed by: INTERNAL MEDICINE

## 2025-06-03 PROCEDURE — 1159F MED LIST DOCD IN RCRD: CPT | Performed by: INTERNAL MEDICINE

## 2025-06-03 PROCEDURE — 1160F RVW MEDS BY RX/DR IN RCRD: CPT | Performed by: INTERNAL MEDICINE

## 2025-06-03 PROCEDURE — 3079F DIAST BP 80-89 MM HG: CPT | Performed by: INTERNAL MEDICINE

## 2025-06-03 PROCEDURE — 3074F SYST BP LT 130 MM HG: CPT | Performed by: INTERNAL MEDICINE

## 2025-06-03 NOTE — PROGRESS NOTES
Chief Complaint  new patient, chest pain  (Come and go ), and Fatigue    Subjective            Corey Velasquez presents to [facility]  Fatigue  Symptoms include chest pain and fatigue.        70-year-old male.  He has no significant cardiac history.  He was referred because of chest pain.  Over the past few weeks he has had intermittent episodes of sharp chest pain centered to right side of his chest radiating through to the back and migratory.  It is not exertional.  Sometimes lasts 10 to 15 minutes.  He typically stays active.  At times he feels fatigued after an episode of chest discomfort.    PMH  Past Medical History:   Diagnosis Date    ADD (attention deficit disorder)     PREDOMINANTLY INATTENTIVE TYPE    Anemia, unspecified     Anesthesia complication     DIFFICULT TO AROUSE AFTER GALLBLADDER SURGERY 10-12 YEARS AGO, HAS HAD SURGERY SINCE WITHOUT DIFFICULTY    Cough     Depression     Major depressive disorder, single episode, mild    Essential (primary) hypertension     Heart murmur 1957    Knee pain, left     Limited mobility     LEFT KNEE    Neuromuscular disorder 2020    Other hydrocele     Tachycardia, unspecified     Unilateral primary osteoarthritis, left knee     Weakness     LEFT KNEE         SURGICALHX  Past Surgical History:   Procedure Laterality Date    CATARACT EXTRACTION, BILATERAL      CHOLECYSTECTOMY  2012    COLONOSCOPY      2007; NEG    ENDOSCOPY  2007    GASTRITIS    JOINT REPLACEMENT Left 04/2016    TKA    ORTHOPEDIC SURGERY Right     HAND    REVISION TOTAL KNEE ARTHROPLASTY Left     TONSILLECTOMY AND ADENOIDECTOMY      TOTAL KNEE ARTHROPLASTY REVISION Left 11/1/2021    Procedure: LEFT TOTAL KNEE  REVISION;  Surgeon: Jan Kruse MD;  Location: [facility];  Service: Orthopedics;  Laterality: Left;        SOC  Social History     Socioeconomic History    Marital status:      Spouse name: MANISH SCOTT    Number of children: 2   Tobacco Use    Smoking  status: Former     Current packs/day: 0.00     Average packs/day: 2.0 packs/day for 30.0 years (60.0 ttl pk-yrs)     Types: Cigarettes     Start date: 1/15/1982     Quit date: 1/15/2012     Years since quittin.3     Passive exposure: Never    Smokeless tobacco: Never   Vaping Use    Vaping status: Never Used   Substance and Sexual Activity    Alcohol use: Not Currently    Drug use: Never    Sexual activity: Yes     Partners: Female     Birth control/protection: None         FAMHX  Family History   Problem Relation Age of Onset    Pneumonia Mother     Lymphoma Mother     Heart attack Father     Cancer Father     Malig Hyperthermia Neg Hx           ALLERGY  Allergies   Allergen Reactions    Shingrix [Zoster Vac Recomb Adjuvanted] Other (See Comments)     adverse        MEDSCURRENT    Current Outpatient Medications:     amphetamine-dextroamphetamine (Adderall) 10 MG tablet, Take 3 tablets by mouth Daily., Disp: 90 tablet, Rfl: 0    DULoxetine (CYMBALTA) 30 MG capsule, Take 1 capsule by mouth Daily., Disp: 90 capsule, Rfl: 1    glucose blood test strip, Use as instructed to check blood sugar daily Dx E16.2, Disp: 100 each, Rfl: 3    glucose monitor monitoring kit, Use as directed; check blood sugar once daily DxE16.2, Disp: 1 each, Rfl: 0    Lancets misc, Check as directed, dx E16.2, Disp: 100 each, Rfl: 3    magnesium gluconate (MAGONATE) 500 MG tablet, Take 1 tablet by mouth 2 (Two) Times a Day., Disp: , Rfl:     metoprolol succinate XL (TOPROL-XL) 25 MG 24 hr tablet, Take 1 tablet by mouth Daily., Disp: 90 tablet, Rfl: 1    pantoprazole (PROTONIX) 40 MG EC tablet, Take 1 tablet by mouth Daily., Disp: 90 tablet, Rfl: 1      Review of Systems   Constitutional: Positive for fatigue and malaise/fatigue.   HENT: Negative.     Eyes: Negative.    Cardiovascular:  Positive for chest pain.   Respiratory: Negative.     Endocrine: Negative.    Hematologic/Lymphatic: Negative.    Skin: Negative.    Musculoskeletal:   "Positive for back pain.   Gastrointestinal: Negative.    Genitourinary: Negative.    Neurological: Negative.    Psychiatric/Behavioral: Negative.          Objective     /85   Pulse 95   Ht 170.2 cm (67.01\")   Wt 96 kg (211 lb 9.6 oz)   SpO2 97%   BMI 33.13 kg/m²       General Appearance:   well developed  well nourished  HENT:   oropharynx moist  lips not cyanotic  Neck:  thyroid not enlarged  supple  Respiratory:  no respiratory distress  normal breath sounds  no rales  Cardiovascular:  no jugular venous distention  regular rhythm  apical impulse normal  S1 normal, S2 normal  no S3, no S4   Very soft parasternal systolic murmur  no rub, no thrill  carotid pulses normal; no bruit  pedal pulses normal  lower extremity edema: none    Musculoskeletal:  no clubbing of fingers.   normocephalic, head atraumatic  Skin:   warm, dry  Psychiatric:  judgement and insight appropriate  normal mood and affect      Result Review :     The following data was reviewed by: Hardy Nguyen MD on 06/03/2025:    CMP          9/24/2024    09:13 12/27/2024    10:07   CMP   Glucose 104  91    BUN 7  6    Creatinine 0.79  0.81    EGFR 97.4  96.6    Sodium 140  139    Potassium 4.4  4.2    Chloride 104  103    Calcium 9.1  9.0    Total Protein  7.3    Albumin  4.0    Globulin  3.3    Total Bilirubin  0.2    Alkaline Phosphatase  286    AST (SGOT)  21    ALT (SGPT)  16    Albumin/Globulin Ratio  1.2    BUN/Creatinine Ratio 8.9  7.4    Anion Gap 8.1  10.0      CBC          9/24/2024    09:13 12/27/2024    10:07   CBC   WBC 6.09     RBC 4.15     Hemoglobin 13.1  13.0    Hematocrit 39.7  39.7    MCV 95.7     MCH 31.6     MCHC 33.0     RDW 11.7     Platelets 285       Lipid Panel          2/6/2025    10:50   Lipid Panel   Total Cholesterol 146    Triglycerides 86    HDL Cholesterol 36    VLDL Cholesterol 17    LDL Cholesterol  93    LDL/HDL Ratio 2.58      TSH          12/27/2024    10:07   TSH   TSH 2.070        Data " reviewed : Primary care records reviewed, laboratory studies reviewed, EKG from September showed sinus rhythm rate 78, normal EKG, no previous for comparison. CT chest previously did show coronary calcifications.  Most recent LDL 93    Procedures                Assessment and Plan        ASSESSMENT:  Encounter Diagnoses   Name Primary?    Precordial pain Yes    Hypertension, essential          PLAN:    1.  Precordial pain-features are not entirely typical.  His pain is sharp, migratory and at times right-sided.  It is also not exertional.  His baseline EKG previously was normal.  He does have risk factors.  Stress imaging and echo will be scheduled to evaluate for ischemic or structural abnormalities.  2.  Essential hypertension-controlled today, continue current medical management.  Of note I typically would avoid the use of Adderall along with a beta-blocker.  It seems that the effect of one would counteract the other.  Will defer this to primary care.  The patient states that he has been on both of these medications for several years.  3.  We will discuss the diagnostic results when available.  He is agreeable with this plan          Patient was given instructions and counseling regarding his condition or for health maintenance advice. Please see specific information pulled into the AVS if appropriate.             VIKKI Nguyen MD  6/3/2025    13:39 EDT

## 2025-06-17 DIAGNOSIS — F98.8 ATTENTION DEFICIT DISORDER (ADD) WITHOUT HYPERACTIVITY: Chronic | ICD-10-CM

## 2025-06-17 RX ORDER — DEXTROAMPHETAMINE SACCHARATE, AMPHETAMINE ASPARTATE, DEXTROAMPHETAMINE SULFATE AND AMPHETAMINE SULFATE 2.5; 2.5; 2.5; 2.5 MG/1; MG/1; MG/1; MG/1
3 TABLET ORAL DAILY
Qty: 90 TABLET | Refills: 0 | OUTPATIENT
Start: 2025-06-17

## 2025-06-17 RX ORDER — DEXTROAMPHETAMINE SACCHARATE, AMPHETAMINE ASPARTATE, DEXTROAMPHETAMINE SULFATE AND AMPHETAMINE SULFATE 2.5; 2.5; 2.5; 2.5 MG/1; MG/1; MG/1; MG/1
30 TABLET ORAL DAILY
Qty: 90 TABLET | Refills: 0 | Status: SHIPPED | OUTPATIENT
Start: 2025-06-17

## 2025-06-17 NOTE — TELEPHONE ENCOUNTER
"Caller: Corey Velasquez \"Herrera\"    Relationship: Self    Best call back number:     [phone]     Requested Prescriptions:   Requested Prescriptions     Pending Prescriptions Disp Refills    amphetamine-dextroamphetamine (Adderall) 10 MG tablet 90 tablet 0     Sig: Take 3 tablets by mouth Daily.        Pharmacy where request should be sent: [facility] [address] [phone] [facility] [phone] FX     Last office visit with prescribing clinician: 5/29/2025   Last telemedicine visit with prescribing clinician: Visit date not found   Next office visit with prescribing clinician: 9/5/2025     Additional details provided by patient:     Does the patient have less than a 3 day supply:  [] Yes  [] No    Would you like a call back once the refill request has been completed: [] Yes [] No    If the office needs to give you a call back, can they leave a voicemail: [] Yes [] No    Jeanmarie Botello   06/17/25 11:55 EDT   "

## 2025-06-27 NOTE — NURSING NOTE
Called to remind patient of appointment. Told to arrive 15 min early. Instructed patient not to take any medications in the morning, but bring them with them to take after the stress portion of the test. nothing to eat or drink 4-6 hours prior to appointment, and no caffeine, decaf coffee, sodas or chocolate for 12 prior to the test. Told to arrive at the main floor for registration in the medical pavilion. Patient voiced understanding with no questions.

## 2025-06-30 ENCOUNTER — HOSPITAL ENCOUNTER (OUTPATIENT)
Facility: HOSPITAL | Age: 70
Discharge: HOME | End: 2025-06-30
Payer: MEDICARE

## 2025-06-30 DIAGNOSIS — R07.2 PRECORDIAL PAIN: ICD-10-CM

## 2025-06-30 LAB
BH CV IMMEDIATE POST RECOVERY TECH DATA SYMPTOMS: NORMAL
BH CV IMMEDIATE POST TECH DATA BLOOD PRESSURE: NORMAL MMHG
BH CV IMMEDIATE POST TECH DATA HEART RATE: 136 BPM
BH CV IMMEDIATE POST TECH DATA OXYGEN SATS: 96 %
BH CV REST NUCLEAR ISOTOPE DOSE: 10.1 MCI
BH CV SIX MINUTE RECOVERY TECH DATA BLOOD PRESSURE: NORMAL
BH CV SIX MINUTE RECOVERY TECH DATA HEART RATE: 85 BPM
BH CV SIX MINUTE RECOVERY TECH DATA OXYGEN SATURATION: 98 %
BH CV SIX MINUTE RECOVERY TECH DATA SYMPTOMS: NORMAL
BH CV STRESS BP STAGE 1: NORMAL
BH CV STRESS BP STAGE 2: NORMAL
BH CV STRESS BP STAGE 3: NORMAL
BH CV STRESS DURATION MIN STAGE 1: 3
BH CV STRESS DURATION MIN STAGE 2: 3
BH CV STRESS DURATION MIN STAGE 3: 1
BH CV STRESS DURATION SEC STAGE 1: 0
BH CV STRESS DURATION SEC STAGE 2: 0
BH CV STRESS DURATION SEC STAGE 3: 0
BH CV STRESS GRADE STAGE 1: 10
BH CV STRESS GRADE STAGE 2: 12
BH CV STRESS GRADE STAGE 3: 14
BH CV STRESS HR STAGE 1: 122
BH CV STRESS HR STAGE 2: 130
BH CV STRESS HR STAGE 3: 138
BH CV STRESS METS STAGE 1: 5
BH CV STRESS METS STAGE 2: 7.5
BH CV STRESS METS STAGE 3: 10
BH CV STRESS NUCLEAR ISOTOPE DOSE: 36.8 MCI
BH CV STRESS O2 STAGE 1: 95
BH CV STRESS O2 STAGE 2: 93
BH CV STRESS O2 STAGE 3: 96
BH CV STRESS PROTOCOL 1: NORMAL
BH CV STRESS RECOVERY BP: NORMAL MMHG
BH CV STRESS RECOVERY HR: 85 BPM
BH CV STRESS RECOVERY O2: 97 %
BH CV STRESS SPEED STAGE 1: 1.7
BH CV STRESS SPEED STAGE 2: 2.5
BH CV STRESS SPEED STAGE 3: 3.4
BH CV STRESS STAGE 1: 1
BH CV STRESS STAGE 2: 2
BH CV STRESS STAGE 3: 3
BH CV THREE MINUTE POST TECH DATA BLOOD PRESSURE: NORMAL MMHG
BH CV THREE MINUTE POST TECH DATA HEART RATE: 91 BPM
BH CV THREE MINUTE POST TECH DATA OXYGEN SATURATION: 96 %
BH CV THREE MINUTE RECOVERY TECH DATA SYMPTOM: NORMAL
MAXIMAL PREDICTED HEART RATE: 152 BPM
PERCENT MAX PREDICTED HR: 90.79 %
SPECT HRT GATED+EF W RNC IV: 32 %
STRESS BASELINE BP: NORMAL MMHG
STRESS BASELINE HR: 76 BPM
STRESS O2 SAT REST: 96 %
STRESS PERCENT HR: 107 %
STRESS POST ESTIMATED WORKLOAD: 9.1 METS
STRESS POST EXERCISE DUR MIN: 7 MIN
STRESS POST EXERCISE DUR SEC: 0 SEC
STRESS POST O2 SAT PEAK: 98 %
STRESS POST PEAK BP: NORMAL MMHG
STRESS POST PEAK HR: 138 BPM
STRESS TARGET HR: 129 BPM

## 2025-06-30 PROCEDURE — 93018 CV STRESS TEST I&R ONLY: CPT | Performed by: INTERNAL MEDICINE

## 2025-06-30 PROCEDURE — 78452 HT MUSCLE IMAGE SPECT MULT: CPT | Performed by: INTERNAL MEDICINE

## 2025-06-30 PROCEDURE — 34310000005 TECHNETIUM TETROFOSMIN KIT: Performed by: INTERNAL MEDICINE

## 2025-06-30 PROCEDURE — 93306 TTE W/DOPPLER COMPLETE: CPT

## 2025-06-30 PROCEDURE — 93016 CV STRESS TEST SUPVJ ONLY: CPT | Performed by: INTERNAL MEDICINE

## 2025-06-30 PROCEDURE — 78452 HT MUSCLE IMAGE SPECT MULT: CPT

## 2025-06-30 PROCEDURE — 93017 CV STRESS TEST TRACING ONLY: CPT

## 2025-06-30 PROCEDURE — A9502 TC99M TETROFOSMIN: HCPCS | Performed by: INTERNAL MEDICINE

## 2025-06-30 RX ADMIN — TETROFOSMIN 1 DOSE: 1.38 INJECTION, POWDER, LYOPHILIZED, FOR SOLUTION INTRAVENOUS at 09:50

## 2025-06-30 RX ADMIN — TETROFOSMIN 1 DOSE: 1.38 INJECTION, POWDER, LYOPHILIZED, FOR SOLUTION INTRAVENOUS at 11:00

## 2025-07-01 ENCOUNTER — RESULTS FOLLOW-UP (OUTPATIENT)
Dept: CARDIOLOGY | Facility: CLINIC | Age: 70
End: 2025-07-01
Payer: MEDICARE

## 2025-07-07 LAB
BH CV ECHO MEAS - AO ROOT DIAM: 2.7 CM
BH CV ECHO MEAS - EDV(CUBED): 132.7 ML
BH CV ECHO MEAS - EDV(MOD-SP2): 107 ML
BH CV ECHO MEAS - EDV(MOD-SP4): 139 ML
BH CV ECHO MEAS - EF(MOD-SP2): 58.5 %
BH CV ECHO MEAS - EF(MOD-SP4): 41.5 %
BH CV ECHO MEAS - ESV(CUBED): 74.1 ML
BH CV ECHO MEAS - ESV(MOD-SP2): 44.4 ML
BH CV ECHO MEAS - ESV(MOD-SP4): 81.3 ML
BH CV ECHO MEAS - FS: 17.6 %
BH CV ECHO MEAS - IVS/LVPW: 0.86 CM
BH CV ECHO MEAS - IVSD: 1.2 CM
BH CV ECHO MEAS - LA DIMENSION: 4 CM
BH CV ECHO MEAS - LAT PEAK E' VEL: 6.4 CM/SEC
BH CV ECHO MEAS - LV DIASTOLIC VOL/BSA (35-75): 67.2 CM2
BH CV ECHO MEAS - LV MASS(C)D: 270.1 GRAMS
BH CV ECHO MEAS - LV SYSTOLIC VOL/BSA (12-30): 39.3 CM2
BH CV ECHO MEAS - LVIDD: 5.1 CM
BH CV ECHO MEAS - LVIDS: 4.2 CM
BH CV ECHO MEAS - LVPWD: 1.4 CM
BH CV ECHO MEAS - MED PEAK E' VEL: 6.2 CM/SEC
BH CV ECHO MEAS - MV A MAX VEL: 81.8 CM/SEC
BH CV ECHO MEAS - MV DEC TIME: 0.22 SEC
BH CV ECHO MEAS - MV E MAX VEL: 73.4 CM/SEC
BH CV ECHO MEAS - MV E/A: 0.9
BH CV ECHO MEAS - RVDD: 2.9 CM
BH CV ECHO MEAS - SV(MOD-SP2): 62.6 ML
BH CV ECHO MEAS - SV(MOD-SP4): 57.7 ML
BH CV ECHO MEAS - SVI(MOD-SP2): 30.3 ML/M2
BH CV ECHO MEAS - SVI(MOD-SP4): 27.9 ML/M2
BH CV ECHO MEASUREMENTS AVERAGE E/E' RATIO: 11.65
IVRT: 103 MS
LV EF BIPLANE MOD: 50.8 %

## 2025-07-07 NOTE — TELEPHONE ENCOUNTER
Spoke with pt and adv Per DI Melendrez: Stress test was normal, no evidence of a blocked artery. Heart function calculated moderately reduced. We will get a more accurate assessment on the echocardiogram, those results are pending however I will call with those results once available       Pt verb understanding and no questions asked

## 2025-07-18 DIAGNOSIS — F98.8 ATTENTION DEFICIT DISORDER (ADD) WITHOUT HYPERACTIVITY: Chronic | ICD-10-CM

## 2025-07-18 RX ORDER — DEXTROAMPHETAMINE SACCHARATE, AMPHETAMINE ASPARTATE, DEXTROAMPHETAMINE SULFATE AND AMPHETAMINE SULFATE 2.5; 2.5; 2.5; 2.5 MG/1; MG/1; MG/1; MG/1
30 TABLET ORAL DAILY
Qty: 90 TABLET | Refills: 0 | Status: SHIPPED | OUTPATIENT
Start: 2025-07-18

## 2025-07-18 NOTE — TELEPHONE ENCOUNTER
"Caller: Corey Velasquez \"Herrera\"    Relationship: Self    Best call back number: [phone]     Requested Prescriptions:   Requested Prescriptions     Pending Prescriptions Disp Refills    amphetamine-dextroamphetamine (Adderall) 10 MG tablet 90 tablet 0     Sig: Take 3 tablets by mouth Daily.        Pharmacy where request should be sent: [facility] [address] - [phone] [facility] [phone] FX     Last office visit with prescribing clinician: 5/29/2025   Last telemedicine visit with prescribing clinician: Visit date not found   Next office visit with prescribing clinician: 9/5/2025       Does the patient have less than a 3 day supply:  [x] Yes  [] No        Jeanmarie Ennis Rep   07/18/25 09:16 EDT         "

## 2025-08-18 DIAGNOSIS — F98.8 ATTENTION DEFICIT DISORDER (ADD) WITHOUT HYPERACTIVITY: Chronic | ICD-10-CM

## 2025-08-19 RX ORDER — DEXTROAMPHETAMINE SACCHARATE, AMPHETAMINE ASPARTATE, DEXTROAMPHETAMINE SULFATE AND AMPHETAMINE SULFATE 2.5; 2.5; 2.5; 2.5 MG/1; MG/1; MG/1; MG/1
30 TABLET ORAL DAILY
Qty: 90 TABLET | Refills: 0 | Status: SHIPPED | OUTPATIENT
Start: 2025-08-19

## (undated) DEVICE — DRAPE,U/ SHT,SPLIT,PLAS,STERIL: Brand: MEDLINE

## (undated) DEVICE — SYRINGE, LUER LOCK, 60ML: Brand: MEDLINE

## (undated) DEVICE — PENCL E/S ULTRAVAC TELESCP NOSE HOLSTR 10FT

## (undated) DEVICE — DUAL CUT SAGITTAL BLADE

## (undated) DEVICE — ANTIBACTERIAL UNDYED BRAIDED (POLYGLACTIN 910), SYNTHETIC ABSORBABLE SUTURE: Brand: COATED VICRYL

## (undated) DEVICE — SOL ISO/ALC RUB 70PCT 4OZ

## (undated) DEVICE — TRAP FLD MINIVAC MEGADYNE 100ML

## (undated) DEVICE — PREP SOL POVIDONE/IODINE BT 4OZ

## (undated) DEVICE — PK KN TOTL 40

## (undated) DEVICE — INTENDED FOR TISSUE SEPARATION, AND OTHER PROCEDURES THAT REQUIRE A SHARP SURGICAL BLADE TO PUNCTURE OR CUT.: Brand: BARD-PARKER ® CARBON RIB-BACK BLADES

## (undated) DEVICE — STRAP STIRUP WO/ RNG

## (undated) DEVICE — SOL NACL 0.9PCT 100ML SGL

## (undated) DEVICE — NEEDLE, QUINCKE, 18GX3.5": Brand: MEDLINE

## (undated) DEVICE — TRY SKINPREP DRYPREP

## (undated) DEVICE — GLV SURG SENSICARE W/ALOE PF LF 8 STRL

## (undated) DEVICE — APPL CHLORAPREP HI/LITE 26ML ORNG

## (undated) DEVICE — RECIPROCATING BLADE, DOUBLE SIDED, OFFSET  (70.0 X 0.64 X 12.6MM)

## (undated) DEVICE — MAT FLR ABSORBENT LG 4FT 10 2.5FT

## (undated) DEVICE — OPTIFOAM GENTLE SA, POSTOP, 4X12: Brand: MEDLINE

## (undated) DEVICE — GLV SURG BIOGEL LTX PF 8

## (undated) DEVICE — GLV SURG BIOGEL LTX PF 8 1/2

## (undated) DEVICE — BNDG ELAS ELITE V/CLOSE 4IN 5YD LF STRL